# Patient Record
Sex: FEMALE | Race: WHITE | NOT HISPANIC OR LATINO | Employment: OTHER | URBAN - METROPOLITAN AREA
[De-identification: names, ages, dates, MRNs, and addresses within clinical notes are randomized per-mention and may not be internally consistent; named-entity substitution may affect disease eponyms.]

---

## 2023-03-26 PROBLEM — E88.09 HYPERPROTEINEMIA: Status: ACTIVE | Noted: 2023-03-26

## 2023-03-26 PROBLEM — F41.9 ANXIETY AND DEPRESSION: Status: ACTIVE | Noted: 2023-03-26

## 2023-03-26 PROBLEM — R10.9 ABDOMINAL PAIN, RIGHT LATERAL: Status: ACTIVE | Noted: 2023-03-26

## 2023-03-26 PROBLEM — N64.4 BREAST PAIN, RIGHT: Status: ACTIVE | Noted: 2023-03-26

## 2023-03-26 PROBLEM — R73.9 HYPERGLYCEMIA: Status: ACTIVE | Noted: 2023-03-26

## 2023-03-26 PROBLEM — R31.9 HEMATURIA: Status: ACTIVE | Noted: 2023-03-26

## 2023-03-26 PROBLEM — E78.5 HYPERLIPIDEMIA: Status: ACTIVE | Noted: 2023-03-26

## 2023-03-26 PROBLEM — R53.83 FATIGUE: Status: ACTIVE | Noted: 2023-03-26

## 2023-03-26 PROBLEM — R30.0 DYSURIA: Status: ACTIVE | Noted: 2023-03-26

## 2023-03-26 PROBLEM — R45.86 EMOTIONAL LABILITY: Status: ACTIVE | Noted: 2023-03-26

## 2023-03-26 PROBLEM — M25.50 POLYARTHRALGIA: Status: ACTIVE | Noted: 2023-03-26

## 2023-03-26 PROBLEM — M35.3 PMR (POLYMYALGIA RHEUMATICA) (MULTI): Status: ACTIVE | Noted: 2023-03-26

## 2023-03-26 PROBLEM — E83.52 HYPERCALCEMIA: Status: ACTIVE | Noted: 2023-03-26

## 2023-03-26 PROBLEM — J45.998 SEASONAL ASTHMA (HHS-HCC): Status: ACTIVE | Noted: 2023-03-26

## 2023-03-26 PROBLEM — F32.A ANXIETY AND DEPRESSION: Status: ACTIVE | Noted: 2023-03-26

## 2023-03-26 PROBLEM — F32.1 CURRENT MODERATE EPISODE OF MAJOR DEPRESSIVE DISORDER (MULTI): Status: ACTIVE | Noted: 2023-03-26

## 2023-03-26 PROBLEM — N18.31 CHRONIC KIDNEY DISEASE, STAGE 3A (MULTI): Status: ACTIVE | Noted: 2023-03-26

## 2023-03-26 PROBLEM — N39.0 URINARY TRACT INFECTION: Status: ACTIVE | Noted: 2023-03-26

## 2023-03-26 PROBLEM — N39.0 RECURRENT URINARY TRACT INFECTION: Status: ACTIVE | Noted: 2023-03-26

## 2023-03-26 PROBLEM — R35.0 INCREASED URINARY FREQUENCY: Status: ACTIVE | Noted: 2023-03-26

## 2023-03-26 PROBLEM — M54.2 NECK PAIN: Status: ACTIVE | Noted: 2023-03-26

## 2023-03-26 PROBLEM — M25.511 RIGHT SHOULDER PAIN: Status: ACTIVE | Noted: 2023-03-26

## 2023-03-26 PROBLEM — R05.9 COUGH: Status: ACTIVE | Noted: 2023-03-26

## 2023-03-26 PROBLEM — F32.A DEPRESSION: Status: ACTIVE | Noted: 2023-03-26

## 2023-03-26 PROBLEM — N32.81 OVERACTIVE BLADDER: Status: ACTIVE | Noted: 2023-03-26

## 2023-03-26 PROBLEM — M81.0 POSTMENOPAUSAL OSTEOPOROSIS: Status: ACTIVE | Noted: 2023-03-26

## 2023-03-26 PROBLEM — I10 HYPERTENSION: Status: ACTIVE | Noted: 2023-03-26

## 2023-03-26 PROBLEM — M54.6 BACK PAIN, THORACIC: Status: ACTIVE | Noted: 2023-03-26

## 2023-03-26 PROBLEM — R42 LIGHTHEADEDNESS: Status: ACTIVE | Noted: 2023-03-26

## 2023-03-26 PROBLEM — R39.89 BLADDER PAIN: Status: ACTIVE | Noted: 2023-03-26

## 2023-03-26 PROBLEM — R70.0 ELEVATED ERYTHROCYTE SEDIMENTATION RATE: Status: ACTIVE | Noted: 2023-03-26

## 2023-03-26 PROBLEM — M54.50 LOW BACK PAIN: Status: ACTIVE | Noted: 2023-03-26

## 2023-03-26 PROBLEM — J02.9 SORE THROAT: Status: ACTIVE | Noted: 2023-03-26

## 2023-03-26 PROBLEM — R10.2 PELVIC PAIN SYNDROME: Status: ACTIVE | Noted: 2023-03-26

## 2023-03-26 PROBLEM — E55.9 VITAMIN D DEFICIENCY: Status: ACTIVE | Noted: 2023-03-26

## 2023-03-26 RX ORDER — MULTIVITAMIN
1 TABLET ORAL
COMMUNITY
Start: 2017-01-06

## 2023-03-26 RX ORDER — LISINOPRIL 10 MG/1
1 TABLET ORAL DAILY
COMMUNITY
Start: 2016-03-03 | End: 2023-07-25 | Stop reason: SDUPTHER

## 2023-03-26 RX ORDER — COD LIVER OIL
2 OIL (ML) ORAL
COMMUNITY
Start: 2021-12-02

## 2023-03-26 RX ORDER — HYDROCHLOROTHIAZIDE 25 MG/1
1 TABLET ORAL DAILY
COMMUNITY
Start: 2015-06-02 | End: 2023-08-14

## 2023-03-26 RX ORDER — AMLODIPINE BESYLATE 10 MG/1
1 TABLET ORAL DAILY
COMMUNITY
Start: 2016-02-09 | End: 2023-03-31 | Stop reason: DRUGHIGH

## 2023-03-26 RX ORDER — ASCORBIC ACID 125 MG
1 TABLET,CHEWABLE ORAL NIGHTLY
COMMUNITY
Start: 2022-04-13

## 2023-03-26 RX ORDER — EVOLOCUMAB 140 MG/ML
140 INJECTION, SOLUTION SUBCUTANEOUS
COMMUNITY
Start: 2022-05-09 | End: 2023-10-02 | Stop reason: SDUPTHER

## 2023-03-26 RX ORDER — CITALOPRAM 20 MG/1
0.5 TABLET, FILM COATED ORAL DAILY
COMMUNITY
Start: 2020-06-15 | End: 2023-10-02 | Stop reason: ALTCHOICE

## 2023-03-31 ENCOUNTER — OFFICE VISIT (OUTPATIENT)
Dept: PRIMARY CARE | Facility: CLINIC | Age: 68
End: 2023-03-31
Payer: MEDICARE

## 2023-03-31 ENCOUNTER — LAB (OUTPATIENT)
Dept: LAB | Facility: LAB | Age: 68
End: 2023-03-31
Payer: MEDICARE

## 2023-03-31 VITALS
BODY MASS INDEX: 29.53 KG/M2 | WEIGHT: 173 LBS | SYSTOLIC BLOOD PRESSURE: 128 MMHG | HEART RATE: 68 BPM | RESPIRATION RATE: 16 BRPM | OXYGEN SATURATION: 99 % | DIASTOLIC BLOOD PRESSURE: 70 MMHG | TEMPERATURE: 97.6 F | HEIGHT: 64 IN

## 2023-03-31 DIAGNOSIS — N18.31 CHRONIC KIDNEY DISEASE, STAGE 3A (MULTI): ICD-10-CM

## 2023-03-31 DIAGNOSIS — N18.9 CHRONIC KIDNEY DISEASE, UNSPECIFIED CKD STAGE: ICD-10-CM

## 2023-03-31 DIAGNOSIS — N39.0 RECURRENT URINARY TRACT INFECTION: ICD-10-CM

## 2023-03-31 DIAGNOSIS — F32.1 CURRENT MODERATE EPISODE OF MAJOR DEPRESSIVE DISORDER WITHOUT PRIOR EPISODE (MULTI): ICD-10-CM

## 2023-03-31 DIAGNOSIS — M35.3 PMR (POLYMYALGIA RHEUMATICA) (MULTI): ICD-10-CM

## 2023-03-31 DIAGNOSIS — I10 PRIMARY HYPERTENSION: Primary | ICD-10-CM

## 2023-03-31 PROBLEM — R70.0 ELEVATED ERYTHROCYTE SEDIMENTATION RATE: Status: RESOLVED | Noted: 2023-03-26 | Resolved: 2023-03-31

## 2023-03-31 PROBLEM — N64.4 BREAST PAIN, RIGHT: Status: RESOLVED | Noted: 2023-03-26 | Resolved: 2023-03-31

## 2023-03-31 PROBLEM — R45.86 EMOTIONAL LABILITY: Status: RESOLVED | Noted: 2023-03-26 | Resolved: 2023-03-31

## 2023-03-31 PROBLEM — M25.511 RIGHT SHOULDER PAIN: Status: RESOLVED | Noted: 2023-03-26 | Resolved: 2023-03-31

## 2023-03-31 PROBLEM — M54.2 NECK PAIN: Status: RESOLVED | Noted: 2023-03-26 | Resolved: 2023-03-31

## 2023-03-31 PROBLEM — R53.83 FATIGUE: Status: RESOLVED | Noted: 2023-03-26 | Resolved: 2023-03-31

## 2023-03-31 PROBLEM — R42 LIGHTHEADEDNESS: Status: RESOLVED | Noted: 2023-03-26 | Resolved: 2023-03-31

## 2023-03-31 PROBLEM — M54.6 BACK PAIN, THORACIC: Status: RESOLVED | Noted: 2023-03-26 | Resolved: 2023-03-31

## 2023-03-31 PROBLEM — M54.50 LOW BACK PAIN: Status: RESOLVED | Noted: 2023-03-26 | Resolved: 2023-03-31

## 2023-03-31 PROBLEM — R39.89 BLADDER PAIN: Status: RESOLVED | Noted: 2023-03-26 | Resolved: 2023-03-31

## 2023-03-31 PROBLEM — J02.9 SORE THROAT: Status: RESOLVED | Noted: 2023-03-26 | Resolved: 2023-03-31

## 2023-03-31 PROBLEM — R30.0 DYSURIA: Status: RESOLVED | Noted: 2023-03-26 | Resolved: 2023-03-31

## 2023-03-31 PROBLEM — R35.0 INCREASED URINARY FREQUENCY: Status: RESOLVED | Noted: 2023-03-26 | Resolved: 2023-03-31

## 2023-03-31 PROBLEM — R10.9 ABDOMINAL PAIN, RIGHT LATERAL: Status: RESOLVED | Noted: 2023-03-26 | Resolved: 2023-03-31

## 2023-03-31 PROBLEM — R05.9 COUGH: Status: RESOLVED | Noted: 2023-03-26 | Resolved: 2023-03-31

## 2023-03-31 PROBLEM — R10.2 PELVIC PAIN SYNDROME: Status: RESOLVED | Noted: 2023-03-26 | Resolved: 2023-03-31

## 2023-03-31 LAB
ALANINE AMINOTRANSFERASE (SGPT) (U/L) IN SER/PLAS: 14 U/L (ref 7–45)
ALBUMIN (G/DL) IN SER/PLAS: 4.6 G/DL (ref 3.4–5)
ALKALINE PHOSPHATASE (U/L) IN SER/PLAS: 51 U/L (ref 33–136)
ANION GAP IN SER/PLAS: 13 MMOL/L (ref 10–20)
ASPARTATE AMINOTRANSFERASE (SGOT) (U/L) IN SER/PLAS: 16 U/L (ref 9–39)
BILIRUBIN TOTAL (MG/DL) IN SER/PLAS: 0.4 MG/DL (ref 0–1.2)
CALCIUM (MG/DL) IN SER/PLAS: 10.5 MG/DL (ref 8.6–10.3)
CARBON DIOXIDE, TOTAL (MMOL/L) IN SER/PLAS: 32 MMOL/L (ref 21–32)
CHLORIDE (MMOL/L) IN SER/PLAS: 99 MMOL/L (ref 98–107)
CHOLESTEROL (MG/DL) IN SER/PLAS: 342 MG/DL (ref 0–199)
CHOLESTEROL IN HDL (MG/DL) IN SER/PLAS: 74.5 MG/DL
CHOLESTEROL/HDL RATIO: 4.6
COBALAMIN (VITAMIN B12) (PG/ML) IN SER/PLAS: 833 PG/ML (ref 211–911)
CREATININE (MG/DL) IN SER/PLAS: 0.85 MG/DL (ref 0.5–1.05)
ERYTHROCYTE DISTRIBUTION WIDTH (RATIO) BY AUTOMATED COUNT: 14.8 % (ref 11.5–14.5)
ERYTHROCYTE MEAN CORPUSCULAR HEMOGLOBIN CONCENTRATION (G/DL) BY AUTOMATED: 32.2 G/DL (ref 32–36)
ERYTHROCYTE MEAN CORPUSCULAR VOLUME (FL) BY AUTOMATED COUNT: 88 FL (ref 80–100)
ERYTHROCYTES (10*6/UL) IN BLOOD BY AUTOMATED COUNT: 4.86 X10E12/L (ref 4–5.2)
GFR FEMALE: 74 ML/MIN/1.73M2
GLUCOSE (MG/DL) IN SER/PLAS: 100 MG/DL (ref 74–99)
HEMATOCRIT (%) IN BLOOD BY AUTOMATED COUNT: 42.6 % (ref 36–46)
HEMOGLOBIN (G/DL) IN BLOOD: 13.7 G/DL (ref 12–16)
LDL: 214 MG/DL (ref 0–99)
LEUKOCYTES (10*3/UL) IN BLOOD BY AUTOMATED COUNT: 5.4 X10E9/L (ref 4.4–11.3)
NON HDL CHOLESTEROL: 268 MG/DL
PLATELETS (10*3/UL) IN BLOOD AUTOMATED COUNT: 295 X10E9/L (ref 150–450)
POTASSIUM (MMOL/L) IN SER/PLAS: 3.8 MMOL/L (ref 3.5–5.3)
PROTEIN TOTAL: 7.4 G/DL (ref 6.4–8.2)
SEDIMENTATION RATE, ERYTHROCYTE: 17 MM/H (ref 0–30)
SODIUM (MMOL/L) IN SER/PLAS: 140 MMOL/L (ref 136–145)
TRIGLYCERIDE (MG/DL) IN SER/PLAS: 268 MG/DL (ref 0–149)
UREA NITROGEN (MG/DL) IN SER/PLAS: 24 MG/DL (ref 6–23)
VLDL: 54 MG/DL (ref 0–40)

## 2023-03-31 PROCEDURE — 1160F RVW MEDS BY RX/DR IN RCRD: CPT | Performed by: INTERNAL MEDICINE

## 2023-03-31 PROCEDURE — 3074F SYST BP LT 130 MM HG: CPT | Performed by: INTERNAL MEDICINE

## 2023-03-31 PROCEDURE — 80053 COMPREHEN METABOLIC PANEL: CPT

## 2023-03-31 PROCEDURE — 82607 VITAMIN B-12: CPT

## 2023-03-31 PROCEDURE — 99214 OFFICE O/P EST MOD 30 MIN: CPT | Performed by: INTERNAL MEDICINE

## 2023-03-31 PROCEDURE — 1036F TOBACCO NON-USER: CPT | Performed by: INTERNAL MEDICINE

## 2023-03-31 PROCEDURE — 85027 COMPLETE CBC AUTOMATED: CPT

## 2023-03-31 PROCEDURE — 1159F MED LIST DOCD IN RCRD: CPT | Performed by: INTERNAL MEDICINE

## 2023-03-31 PROCEDURE — 3078F DIAST BP <80 MM HG: CPT | Performed by: INTERNAL MEDICINE

## 2023-03-31 PROCEDURE — 85652 RBC SED RATE AUTOMATED: CPT

## 2023-03-31 PROCEDURE — 36415 COLL VENOUS BLD VENIPUNCTURE: CPT

## 2023-03-31 PROCEDURE — 80061 LIPID PANEL: CPT

## 2023-03-31 RX ORDER — AMOXICILLIN AND CLAVULANATE POTASSIUM 875; 125 MG/1; MG/1
875 TABLET, FILM COATED ORAL 2 TIMES DAILY
Qty: 20 TABLET | Refills: 0 | Status: SHIPPED | OUTPATIENT
Start: 2023-03-31 | End: 2023-04-10

## 2023-03-31 RX ORDER — AMLODIPINE BESYLATE 10 MG/1
5 TABLET ORAL DAILY
Qty: 45 TABLET | Refills: 3 | Status: SHIPPED | OUTPATIENT
Start: 2023-03-31 | End: 2023-08-14

## 2023-03-31 RX ORDER — AMOXICILLIN AND CLAVULANATE POTASSIUM 875; 125 MG/1; MG/1
TABLET, FILM COATED ORAL 2 TIMES DAILY
COMMUNITY
End: 2023-03-31 | Stop reason: SDUPTHER

## 2023-03-31 ASSESSMENT — PATIENT HEALTH QUESTIONNAIRE - PHQ9
1. LITTLE INTEREST OR PLEASURE IN DOING THINGS: NOT AT ALL
SUM OF ALL RESPONSES TO PHQ9 QUESTIONS 1 AND 2: 0
2. FEELING DOWN, DEPRESSED OR HOPELESS: NOT AT ALL

## 2023-03-31 ASSESSMENT — ENCOUNTER SYMPTOMS
FATIGUE: 0
FEVER: 0
COUGH: 0
SHORTNESS OF BREATH: 0

## 2023-03-31 NOTE — PROGRESS NOTES
"Subjective   Sherrie Chan is a 67 y.o. female who presents for 6 month Hypertension follow up.    HPI   Occasionally monitoring BP.  States BP WNL.  Denies chest pain, SOB, edema.  C/o increase in migraine like headaches. Frontal.  Vision blurred.  Occurring w/UTI's.   Last for a few weeks after.    Pt reports taking ATB PRN for urinary sx's.  Exacerbated beginning of March.  No further sx's.  Feeling much better.    Review of Systems   Constitutional:  Negative for fatigue and fever.   Respiratory:  Negative for cough and shortness of breath.    Cardiovascular:  Negative for chest pain and leg swelling.   All other systems reviewed and are negative.      Health Maintenance Due   Topic Date Due    Medicare Annual Wellness Visit (AWV)  Never done    Zoster Vaccines (1 of 2) Never done    DTaP/Tdap/Td Vaccines (2 - Td or Tdap) 01/27/2019    Pneumococcal Vaccine: 65+ Years (2 - PPSV23 if available, else PCV20) 07/11/2019    COVID-19 Vaccine (4 - Booster for Pfizer series) 12/19/2021    Influenza Vaccine (1) 09/01/2022    Diabetes Screening  12/02/2022       Objective   /70   Pulse 68   Temp 36.4 °C (97.6 °F)   Resp 16   Ht 1.626 m (5' 4\")   Wt 78.5 kg (173 lb)   SpO2 99%   BMI 29.70 kg/m²     Physical Exam  Vitals and nursing note reviewed.   Constitutional:       Appearance: Normal appearance.   HENT:      Head: Normocephalic.   Eyes:      Conjunctiva/sclera: Conjunctivae normal.      Pupils: Pupils are equal, round, and reactive to light.   Cardiovascular:      Rate and Rhythm: Normal rate and regular rhythm.      Pulses: Normal pulses.      Heart sounds: Normal heart sounds.   Pulmonary:      Effort: Pulmonary effort is normal.      Breath sounds: Normal breath sounds.   Musculoskeletal:         General: No swelling.      Cervical back: Neck supple.   Skin:     General: Skin is warm and dry.   Neurological:      General: No focal deficit present.      Mental Status: She is oriented to person, place, " and time.         Assessment/Plan   Problem List Items Addressed This Visit       Chronic kidney disease, stage 3a    Relevant Orders    CBC    Comprehensive Metabolic Panel    Lipid Panel    Vitamin B12    Depression    Hypertension - Primary    PMR (polymyalgia rheumatica) (CMS/[facility])    Relevant Orders    Sedimentation Rate    Recurrent urinary tract infection    Relevant Medications    amoxicillin-pot clavulanate (Augmentin) 875-125 mg tablet     Other Visit Diagnoses       Chronic kidney disease, unspecified CKD stage        Relevant Orders    Lipid Panel          Check labs  Cont meds  Stable based on symptoms and exam.  Continue established treatment plan and follow up at least yearly.  Cont abx as needed for uti  Call when symptoms andwill order CT Urogram   Samples of nurtec for headaches

## 2023-05-16 ENCOUNTER — TELEPHONE (OUTPATIENT)
Dept: PRIMARY CARE | Facility: CLINIC | Age: 68
End: 2023-05-16
Payer: MEDICARE

## 2023-05-16 DIAGNOSIS — R10.9 FLANK PAIN: ICD-10-CM

## 2023-05-16 DIAGNOSIS — N39.0 URINARY TRACT INFECTION WITHOUT HEMATURIA, SITE UNSPECIFIED: ICD-10-CM

## 2023-05-16 DIAGNOSIS — R31.9 HEMATURIA, UNSPECIFIED TYPE: Primary | ICD-10-CM

## 2023-05-16 DIAGNOSIS — N39.0 RECURRENT URINARY TRACT INFECTION: ICD-10-CM

## 2023-05-16 RX ORDER — AMOXICILLIN AND CLAVULANATE POTASSIUM 875; 125 MG/1; MG/1
875 TABLET, FILM COATED ORAL 2 TIMES DAILY
Qty: 20 TABLET | Refills: 0 | Status: SHIPPED | OUTPATIENT
Start: 2023-05-16 | End: 2023-05-26

## 2023-05-16 NOTE — TELEPHONE ENCOUNTER
Pt left vm stating she discussed w/you during last appt about getting CT Urogram when sx's are flaring.  Sx's are currently flaring.  Ok for Urogram?  Please advise.

## 2023-05-23 DIAGNOSIS — R31.1 BENIGN ESSENTIAL MICROSCOPIC HEMATURIA: Primary | ICD-10-CM

## 2023-05-25 ENCOUNTER — LAB (OUTPATIENT)
Dept: LAB | Facility: LAB | Age: 68
End: 2023-05-25
Payer: MEDICARE

## 2023-05-25 DIAGNOSIS — R31.1 BENIGN ESSENTIAL MICROSCOPIC HEMATURIA: ICD-10-CM

## 2023-05-25 LAB
ANION GAP IN SER/PLAS: 11 MMOL/L (ref 10–20)
CALCIUM (MG/DL) IN SER/PLAS: 10 MG/DL (ref 8.6–10.3)
CARBON DIOXIDE, TOTAL (MMOL/L) IN SER/PLAS: 32 MMOL/L (ref 21–32)
CHLORIDE (MMOL/L) IN SER/PLAS: 100 MMOL/L (ref 98–107)
CREATININE (MG/DL) IN SER/PLAS: 1.02 MG/DL (ref 0.5–1.05)
GFR FEMALE: 59 ML/MIN/1.73M2
GLUCOSE (MG/DL) IN SER/PLAS: 117 MG/DL (ref 74–99)
POTASSIUM (MMOL/L) IN SER/PLAS: 3.9 MMOL/L (ref 3.5–5.3)
SODIUM (MMOL/L) IN SER/PLAS: 139 MMOL/L (ref 136–145)
UREA NITROGEN (MG/DL) IN SER/PLAS: 24 MG/DL (ref 6–23)

## 2023-05-25 PROCEDURE — 80048 BASIC METABOLIC PNL TOTAL CA: CPT

## 2023-05-25 PROCEDURE — 36415 COLL VENOUS BLD VENIPUNCTURE: CPT

## 2023-05-30 ENCOUNTER — TELEPHONE (OUTPATIENT)
Dept: PRIMARY CARE | Facility: CLINIC | Age: 68
End: 2023-05-30
Payer: MEDICARE

## 2023-05-30 NOTE — TELEPHONE ENCOUNTER
----- Message from Eufemia Ascencio DO sent at 5/30/2023  4:50 PM EDT -----  No they are in the colon and there is no surrounding inflammation or suspicion for fistula  ----- Message -----  From: Amisha Lin LPN  Sent: 5/30/2023   3:56 PM EDT  To: Eufemia Ascencio DO    Pt made aware.  Pt read scan.  Questioned the mention of diverticula and could be r/t recurrent UTI's?  Please advise.  ----- Message -----  From: Amisha Lin LPN  Sent: 5/30/2023  11:59 AM EDT  To: Amisha Lin LPN    [facility]  ----- Message -----  From: Eufemia Ascencio DO  Sent: 5/30/2023  10:06 AM EDT  To: Amisha Lin LPN    Call patient looks pretty good  Tiny cyst in left kidney  Fatty liver  Small belly button hernia

## 2023-07-13 ENCOUNTER — OFFICE VISIT (OUTPATIENT)
Dept: PRIMARY CARE | Facility: CLINIC | Age: 68
End: 2023-07-13
Payer: MEDICARE

## 2023-07-13 VITALS
WEIGHT: 176 LBS | HEIGHT: 64 IN | BODY MASS INDEX: 30.05 KG/M2 | DIASTOLIC BLOOD PRESSURE: 72 MMHG | TEMPERATURE: 97.8 F | RESPIRATION RATE: 16 BRPM | HEART RATE: 80 BPM | SYSTOLIC BLOOD PRESSURE: 138 MMHG | OXYGEN SATURATION: 98 %

## 2023-07-13 DIAGNOSIS — N39.0 RECURRENT URINARY TRACT INFECTION: ICD-10-CM

## 2023-07-13 PROBLEM — M25.50 POLYARTHRALGIA: Status: RESOLVED | Noted: 2023-03-26 | Resolved: 2023-07-13

## 2023-07-13 PROBLEM — R31.9 HEMATURIA: Status: RESOLVED | Noted: 2023-03-26 | Resolved: 2023-07-13

## 2023-07-13 PROBLEM — E83.52 HYPERCALCEMIA: Status: RESOLVED | Noted: 2023-03-26 | Resolved: 2023-07-13

## 2023-07-13 LAB
POC APPEARANCE, URINE: CLEAR
POC BILIRUBIN, URINE: NEGATIVE
POC BLOOD, URINE: ABNORMAL
POC COLOR, URINE: ABNORMAL
POC GLUCOSE, URINE: NEGATIVE MG/DL
POC KETONES, URINE: NEGATIVE MG/DL
POC LEUKOCYTES, URINE: ABNORMAL
POC NITRITE,URINE: NEGATIVE
POC PH, URINE: 5 PH
POC PROTEIN, URINE: NEGATIVE MG/DL
POC SPECIFIC GRAVITY, URINE: <=1.005
POC UROBILINOGEN, URINE: 0.2 EU/DL

## 2023-07-13 PROCEDURE — 1159F MED LIST DOCD IN RCRD: CPT | Performed by: INTERNAL MEDICINE

## 2023-07-13 PROCEDURE — 1036F TOBACCO NON-USER: CPT | Performed by: INTERNAL MEDICINE

## 2023-07-13 PROCEDURE — 1126F AMNT PAIN NOTED NONE PRSNT: CPT | Performed by: INTERNAL MEDICINE

## 2023-07-13 PROCEDURE — 1160F RVW MEDS BY RX/DR IN RCRD: CPT | Performed by: INTERNAL MEDICINE

## 2023-07-13 PROCEDURE — 99214 OFFICE O/P EST MOD 30 MIN: CPT | Performed by: INTERNAL MEDICINE

## 2023-07-13 PROCEDURE — 81002 URINALYSIS NONAUTO W/O SCOPE: CPT | Performed by: INTERNAL MEDICINE

## 2023-07-13 PROCEDURE — 3075F SYST BP GE 130 - 139MM HG: CPT | Performed by: INTERNAL MEDICINE

## 2023-07-13 PROCEDURE — 3078F DIAST BP <80 MM HG: CPT | Performed by: INTERNAL MEDICINE

## 2023-07-13 RX ORDER — METRONIDAZOLE 7.5 MG/G
CREAM TOPICAL
COMMUNITY
Start: 2023-06-19 | End: 2023-11-08 | Stop reason: ENTERED-IN-ERROR

## 2023-07-13 RX ORDER — CEFDINIR 300 MG/1
300 CAPSULE ORAL 2 TIMES DAILY
Qty: 20 CAPSULE | Refills: 0 | Status: SHIPPED | OUTPATIENT
Start: 2023-07-13 | End: 2023-07-23

## 2023-07-13 RX ORDER — NITROFURANTOIN 25; 75 MG/1; MG/1
100 CAPSULE ORAL DAILY
Qty: 30 CAPSULE | Refills: 5 | Status: SHIPPED | OUTPATIENT
Start: 2023-07-13 | End: 2023-10-02 | Stop reason: SINTOL

## 2023-07-13 ASSESSMENT — ENCOUNTER SYMPTOMS
COUGH: 0
FATIGUE: 0
SHORTNESS OF BREATH: 0
FEVER: 0

## 2023-07-13 ASSESSMENT — PATIENT HEALTH QUESTIONNAIRE - PHQ9
SUM OF ALL RESPONSES TO PHQ9 QUESTIONS 1 AND 2: 0
1. LITTLE INTEREST OR PLEASURE IN DOING THINGS: NOT AT ALL
2. FEELING DOWN, DEPRESSED OR HOPELESS: NOT AT ALL

## 2023-07-13 NOTE — PROGRESS NOTES
"Subjective   Sherrie Chan is a 68 y.o. female who presents for UTI.    HPI   Pt c/o continued UTI sx's.    Worsened over this past weekend.  Dizzy, lightheaded, fatigue, pelvic, R flank pain.  Denies hematuria.  Tx: Aleve    Review of Systems   Constitutional:  Negative for fatigue and fever.   Respiratory:  Negative for cough and shortness of breath.    Cardiovascular:  Negative for chest pain and leg swelling.   All other systems reviewed and are negative.      Health Maintenance Due   Topic Date Due    Medicare Annual Wellness Visit (AWV)  Never done    Zoster Vaccines (1 of 2) Never done    DTaP/Tdap/Td Vaccines (2 - Td or Tdap) 01/27/2019    Pneumococcal Vaccine: 65+ Years (2 - PPSV23 if available, else PCV20) 07/11/2019    COVID-19 Vaccine (4 - Booster for Pfizer series) 12/19/2021    Diabetes Screening  12/02/2022    Mammogram  09/08/2023       Objective   /72   Pulse 80   Temp 36.6 °C (97.8 °F)   Resp 16   Ht 1.626 m (5' 4\")   Wt 79.8 kg (176 lb)   SpO2 98%   BMI 30.21 kg/m²     Physical Exam  Vitals and nursing note reviewed.   Constitutional:       Appearance: Normal appearance.   HENT:      Head: Normocephalic.   Eyes:      Conjunctiva/sclera: Conjunctivae normal.      Pupils: Pupils are equal, round, and reactive to light.   Cardiovascular:      Rate and Rhythm: Normal rate and regular rhythm.      Pulses: Normal pulses.      Heart sounds: Normal heart sounds.   Pulmonary:      Effort: Pulmonary effort is normal.      Breath sounds: Normal breath sounds.   Musculoskeletal:         General: No swelling.      Cervical back: Neck supple.   Skin:     General: Skin is warm and dry.   Neurological:      General: No focal deficit present.      Mental Status: She is oriented to person, place, and time.         Assessment/Plan   Problem List Items Addressed This Visit       Recurrent urinary tract infection    Relevant Medications    cefdinir (Omnicef) 300 mg capsule    nitrofurantoin, " macrocrystal-monohydrate, (Macrobid) 100 mg capsule    Other Relevant Orders    POCT UA (nonautomated) manually resulted (Completed)    Referral to Urology     Fu with urology  Abx and maintenance  Discussed risks and benefits of long term abx  Stable based on symptoms and exam.  Continue established treatment plan and follow up at least yearly.

## 2023-07-25 DIAGNOSIS — I10 PRIMARY HYPERTENSION: Primary | ICD-10-CM

## 2023-07-25 RX ORDER — LISINOPRIL 10 MG/1
10 TABLET ORAL DAILY
Qty: 90 TABLET | Refills: 3 | Status: SHIPPED | OUTPATIENT
Start: 2023-07-25 | End: 2024-02-12 | Stop reason: DRUGHIGH

## 2023-08-14 DIAGNOSIS — I10 ESSENTIAL (PRIMARY) HYPERTENSION: ICD-10-CM

## 2023-08-14 RX ORDER — AMLODIPINE BESYLATE 10 MG/1
10 TABLET ORAL DAILY
Qty: 90 TABLET | Refills: 3 | Status: SHIPPED | OUTPATIENT
Start: 2023-08-14 | End: 2024-05-08

## 2023-08-14 RX ORDER — HYDROCHLOROTHIAZIDE 25 MG/1
25 TABLET ORAL DAILY
Qty: 90 TABLET | Refills: 3 | Status: SHIPPED | OUTPATIENT
Start: 2023-08-14 | End: 2024-05-08

## 2023-08-17 ENCOUNTER — TELEPHONE (OUTPATIENT)
Dept: PRIMARY CARE | Facility: CLINIC | Age: 68
End: 2023-08-17
Payer: MEDICARE

## 2023-08-17 NOTE — TELEPHONE ENCOUNTER
Pt left vm c/o UTI sx's x1 week.  LBP, dysuria, pelvic discomfort.  Taking Macrobid BID x1 week. C/o nausea, headache, dizziness w/use.  Stated she responds well to Bactrim.  Questioned if Dr. Ascencio would Rx.  Dr. Ascencio made aware and will be out of office.  Recommended [facility] eval.    Called pt and left vm to make aware.

## 2023-08-21 ENCOUNTER — TELEPHONE (OUTPATIENT)
Dept: PRIMARY CARE | Facility: CLINIC | Age: 68
End: 2023-08-21
Payer: MEDICARE

## 2023-08-21 LAB — URINE CULTURE: NO GROWTH

## 2023-08-21 NOTE — TELEPHONE ENCOUNTER
Pt left  stating she was seen at [facility] for UTI sx's on Sat.  Rx: Augmentin.  Taken x5 doses.  UTI sx's were improving. C/o diarrhea today and some nausea.  Questioned if ATB should be d/c'd?  Please advise.

## 2023-10-02 ENCOUNTER — OFFICE VISIT (OUTPATIENT)
Dept: PRIMARY CARE | Facility: CLINIC | Age: 68
End: 2023-10-02
Payer: MEDICARE

## 2023-10-02 VITALS
SYSTOLIC BLOOD PRESSURE: 142 MMHG | DIASTOLIC BLOOD PRESSURE: 78 MMHG | BODY MASS INDEX: 30.22 KG/M2 | WEIGHT: 177 LBS | OXYGEN SATURATION: 100 % | TEMPERATURE: 97.9 F | HEIGHT: 64 IN | HEART RATE: 72 BPM | RESPIRATION RATE: 16 BRPM

## 2023-10-02 DIAGNOSIS — N18.31 CHRONIC KIDNEY DISEASE, STAGE 3A (MULTI): ICD-10-CM

## 2023-10-02 DIAGNOSIS — Z12.31 ENCOUNTER FOR SCREENING MAMMOGRAM FOR MALIGNANT NEOPLASM OF BREAST: ICD-10-CM

## 2023-10-02 DIAGNOSIS — E78.2 MIXED HYPERLIPIDEMIA: ICD-10-CM

## 2023-10-02 DIAGNOSIS — N39.0 RECURRENT URINARY TRACT INFECTION: ICD-10-CM

## 2023-10-02 DIAGNOSIS — Z00.00 HEALTHCARE MAINTENANCE: Primary | ICD-10-CM

## 2023-10-02 DIAGNOSIS — F33.1 MODERATE EPISODE OF RECURRENT MAJOR DEPRESSIVE DISORDER (MULTI): ICD-10-CM

## 2023-10-02 DIAGNOSIS — Z23 ENCOUNTER FOR IMMUNIZATION: ICD-10-CM

## 2023-10-02 DIAGNOSIS — I10 PRIMARY HYPERTENSION: ICD-10-CM

## 2023-10-02 DIAGNOSIS — M35.3 PMR (POLYMYALGIA RHEUMATICA) (MULTI): ICD-10-CM

## 2023-10-02 DIAGNOSIS — E55.9 VITAMIN D DEFICIENCY: ICD-10-CM

## 2023-10-02 PROCEDURE — G0008 ADMIN INFLUENZA VIRUS VAC: HCPCS | Performed by: INTERNAL MEDICINE

## 2023-10-02 PROCEDURE — 90662 IIV NO PRSV INCREASED AG IM: CPT | Performed by: INTERNAL MEDICINE

## 2023-10-02 PROCEDURE — 1170F FXNL STATUS ASSESSED: CPT | Performed by: INTERNAL MEDICINE

## 2023-10-02 PROCEDURE — 1159F MED LIST DOCD IN RCRD: CPT | Performed by: INTERNAL MEDICINE

## 2023-10-02 PROCEDURE — 3078F DIAST BP <80 MM HG: CPT | Performed by: INTERNAL MEDICINE

## 2023-10-02 PROCEDURE — 1160F RVW MEDS BY RX/DR IN RCRD: CPT | Performed by: INTERNAL MEDICINE

## 2023-10-02 PROCEDURE — 99214 OFFICE O/P EST MOD 30 MIN: CPT | Performed by: INTERNAL MEDICINE

## 2023-10-02 PROCEDURE — 1036F TOBACCO NON-USER: CPT | Performed by: INTERNAL MEDICINE

## 2023-10-02 PROCEDURE — 1126F AMNT PAIN NOTED NONE PRSNT: CPT | Performed by: INTERNAL MEDICINE

## 2023-10-02 PROCEDURE — 3077F SYST BP >= 140 MM HG: CPT | Performed by: INTERNAL MEDICINE

## 2023-10-02 PROCEDURE — G0439 PPPS, SUBSEQ VISIT: HCPCS | Performed by: INTERNAL MEDICINE

## 2023-10-02 RX ORDER — LACTOBACILLUS ACIDOPHILUS 20B CELL
1 CAPSULE ORAL DAILY
COMMUNITY
Start: 2023-08-31

## 2023-10-02 ASSESSMENT — ENCOUNTER SYMPTOMS
RHINORRHEA: 0
DIARRHEA: 0
DYSURIA: 0
ABDOMINAL PAIN: 0
WHEEZING: 0
EYE PAIN: 0
FEVER: 0
BACK PAIN: 0
DEPRESSION: 0
LOSS OF SENSATION IN FEET: 0
CONSTIPATION: 0
DIFFICULTY URINATING: 0
EYE REDNESS: 0
OCCASIONAL FEELINGS OF UNSTEADINESS: 0
ARTHRALGIAS: 0
WEAKNESS: 0
SHORTNESS OF BREATH: 0
NAUSEA: 0
PALPITATIONS: 0
COUGH: 0
PSYCHIATRIC NEGATIVE: 1
EYE ITCHING: 0
HEADACHES: 0
UNEXPECTED WEIGHT CHANGE: 0
FATIGUE: 0
FREQUENCY: 0
SORE THROAT: 0

## 2023-10-02 ASSESSMENT — ACTIVITIES OF DAILY LIVING (ADL)
MANAGING_FINANCES: INDEPENDENT
DRESSING: INDEPENDENT
TAKING_MEDICATION: INDEPENDENT
BATHING: INDEPENDENT
DOING_HOUSEWORK: INDEPENDENT
GROCERY_SHOPPING: INDEPENDENT

## 2023-10-02 ASSESSMENT — PAIN SCALES - GENERAL: PAINLEVEL: 0-NO PAIN

## 2023-10-02 NOTE — PROGRESS NOTES
Subjective   Reason for Visit: Sherrie Chan is an 68 y.o. female here for a Medicare Wellness visit.     Past Medical, Surgical, and Family History reviewed and updated in chart.    Reviewed all medications by prescribing practitioner or clinical pharmacist (such as prescriptions, OTCs, herbal therapies and supplements) and documented in the medical record.    HPI  Influenza 2022  Covid 2021  PCV13 2019  PPSV23  Shingrix  Tdap  Mammogram 9/2022  Dexa 2022  Cologuard 2021  Adv Dir No  Eye exam 23  Fall risk 23  Depression screen 23  Bmi 30    Pt reports seeing new Urologist.  Does not want to start maintenance ATB.  Rx: D Mannose, Estrace cream, Pelvic floor exercises.  Taking D Mannose every day. Reports nausea, GERD, loose BM's in AM w/BID use.  C/o nocturia, incontinence.    Reports headaches and GI upset w/Macrobid use.  Seen at Urgent Care in August.  Rx: Augmentin.    Patient Care Team:  Eufemia Ascencio DO as PCP - General  Eufemia Ascencio DO as PCP - [employer] PCP     Review of Systems   Constitutional:  Negative for fatigue, fever and unexpected weight change.   HENT:  Negative for congestion, ear pain, rhinorrhea and sore throat.    Eyes:  Negative for pain, redness and itching.   Respiratory:  Negative for cough, shortness of breath and wheezing.    Cardiovascular:  Negative for chest pain, palpitations and leg swelling.   Gastrointestinal:  Negative for abdominal pain, constipation, diarrhea and nausea.   Genitourinary:  Negative for difficulty urinating, dysuria and frequency.   Musculoskeletal:  Negative for arthralgias and back pain.   Allergic/Immunologic: Negative for environmental allergies, food allergies and immunocompromised state.   Neurological:  Negative for weakness and headaches.   Psychiatric/Behavioral: Negative.     All other systems reviewed and are negative.      Objective   Vitals:  /78   Pulse 72   Temp 36.6 °C (97.9 °F)   Resp 16   Ht 1.613 m (5'  "3.5\")   Wt 80.3 kg (177 lb)   SpO2 100%   BMI 30.86 kg/m²       Physical Exam  Vitals and nursing note reviewed.   Constitutional:       Appearance: Normal appearance.   HENT:      Head: Normocephalic.   Eyes:      Conjunctiva/sclera: Conjunctivae normal.      Pupils: Pupils are equal, round, and reactive to light.   Cardiovascular:      Rate and Rhythm: Normal rate and regular rhythm.      Pulses: Normal pulses.      Heart sounds: Normal heart sounds.   Pulmonary:      Effort: Pulmonary effort is normal.      Breath sounds: Normal breath sounds.   Musculoskeletal:         General: No swelling.      Cervical back: Neck supple.   Skin:     General: Skin is warm and dry.   Neurological:      General: No focal deficit present.      Mental Status: She is oriented to person, place, and time.         Assessment/Plan   Problem List Items Addressed This Visit       Chronic kidney disease, stage 3a (CMS/HCC)    Overview     Eufemia Ascencio  Apr 25, 2022 8:15AM  Chronic Condition Documentation: Stable based on last GFR. Continue established treatment plan including avoidance of nephrotoxins and follow-up at least yearly.         Current moderate episode of major depressive disorder (CMS/[facility])    Overview     Eufemia Ascencio  Apr 25, 2022 8:14AM  Chronic Condition Documentation: Stable based on symptoms and exam. Continue established treatment plan and follow-up at least yearly.         Hyperlipidemia    Relevant Orders    Lipid Panel    Thyroid Stimulating Hormone    Hypertension    Relevant Orders    CBC    Comprehensive Metabolic Panel    PMR (polymyalgia rheumatica) (CMS/[facility])    Overview     Eufemia Ascencio  Apr 25, 2022 8:14AM  Chronic Condition Documentation: Stable based on symptoms and exam. Continue established treatment plan and follow-up at least yearly.         Recurrent urinary tract infection    Vitamin D deficiency    Relevant Orders    Vitamin D 25-Hydroxy,Total (for eval of Vitamin D levels)    Vitamin B12 "     Other Visit Diagnoses       Healthcare maintenance    -  Primary    Encounter for screening mammogram for malignant neoplasm of breast        Relevant Orders    BI mammo bilateral screening tomosynthesis        Update preventive   Cont meds  Check labs  Stable based on symptoms and exam.  Continue established treatment plan and follow up at least yearly.

## 2023-10-04 ENCOUNTER — SPECIALTY PHARMACY (OUTPATIENT)
Dept: PHARMACY | Facility: CLINIC | Age: 68
End: 2023-10-04

## 2023-10-04 ENCOUNTER — PHARMACY VISIT (OUTPATIENT)
Dept: PHARMACY | Facility: CLINIC | Age: 68
End: 2023-10-04
Payer: COMMERCIAL

## 2023-10-04 PROCEDURE — RXMED WILLOW AMBULATORY MEDICATION CHARGE

## 2023-10-04 NOTE — PROGRESS NOTES
Specialty Pharmacy Refill Coordination Note     Sherrie Chan is a 68 y.o. female contacted today regarding refills of her specialty medication(s).    Reviewed and verified with patient:                Specialty medication(s) and dose(s) confirmed: yes  Changes to medications: no  Changes to insurance: no    Refill Questions      Flowsheet Row Most Recent Value   Changes to allergies? No   Changes to medications? No   New conditions since last clinic visit No   Unplanned office visit, urgent care, ED, or hospital admission in the last 4 weeks  No            Delivery Questions      Flowsheet Row Most Recent Value   Supplies needed? No supplies needed   Questions or concerns for the pharmacist? No          Spoke with pt Set Delivery for 10/11/2023 MED: REPATHA    ADDRESS:     [address] [place] [address]                 Deb Doherty  Specialty Pharmacy Technician

## 2023-10-16 ENCOUNTER — SPECIALTY PHARMACY (OUTPATIENT)
Dept: PHARMACY | Facility: CLINIC | Age: 68
End: 2023-10-16

## 2023-10-23 ENCOUNTER — APPOINTMENT (OUTPATIENT)
Dept: RADIOLOGY | Facility: CLINIC | Age: 68
End: 2023-10-23
Payer: MEDICARE

## 2023-11-02 ENCOUNTER — EVALUATION (OUTPATIENT)
Dept: PHYSICAL THERAPY | Facility: CLINIC | Age: 68
End: 2023-11-02
Payer: MEDICARE

## 2023-11-02 ENCOUNTER — LAB (OUTPATIENT)
Dept: LAB | Facility: LAB | Age: 68
End: 2023-11-02
Payer: MEDICARE

## 2023-11-02 DIAGNOSIS — I10 PRIMARY HYPERTENSION: ICD-10-CM

## 2023-11-02 DIAGNOSIS — R10.2 PELVIC PAIN: Primary | ICD-10-CM

## 2023-11-02 DIAGNOSIS — M62.89 PELVIC FLOOR DYSFUNCTION: ICD-10-CM

## 2023-11-02 DIAGNOSIS — E78.2 MIXED HYPERLIPIDEMIA: ICD-10-CM

## 2023-11-02 DIAGNOSIS — M62.89 OTHER SPECIFIED DISORDERS OF MUSCLE: ICD-10-CM

## 2023-11-02 DIAGNOSIS — R10.2 PELVIC AND PERINEAL PAIN: ICD-10-CM

## 2023-11-02 DIAGNOSIS — E55.9 VITAMIN D DEFICIENCY: ICD-10-CM

## 2023-11-02 LAB
25(OH)D3 SERPL-MCNC: 40 NG/ML (ref 30–100)
ALBUMIN SERPL BCP-MCNC: 4.4 G/DL (ref 3.4–5)
ALP SERPL-CCNC: 47 U/L (ref 33–136)
ALT SERPL W P-5'-P-CCNC: 13 U/L (ref 7–45)
ANION GAP SERPL CALC-SCNC: 14 MMOL/L (ref 10–20)
AST SERPL W P-5'-P-CCNC: 17 U/L (ref 9–39)
BILIRUB SERPL-MCNC: 0.3 MG/DL (ref 0–1.2)
BUN SERPL-MCNC: 21 MG/DL (ref 6–23)
CALCIUM SERPL-MCNC: 9.9 MG/DL (ref 8.6–10.3)
CHLORIDE SERPL-SCNC: 99 MMOL/L (ref 98–107)
CHOLEST SERPL-MCNC: 285 MG/DL (ref 0–199)
CHOLESTEROL/HDL RATIO: 4.1
CO2 SERPL-SCNC: 30 MMOL/L (ref 21–32)
CREAT SERPL-MCNC: 0.91 MG/DL (ref 0.5–1.05)
ERYTHROCYTE [DISTWIDTH] IN BLOOD BY AUTOMATED COUNT: 14.7 % (ref 11.5–14.5)
GFR SERPL CREATININE-BSD FRML MDRD: 68 ML/MIN/1.73M*2
GLUCOSE SERPL-MCNC: 109 MG/DL (ref 74–99)
HCT VFR BLD AUTO: 41.8 % (ref 36–46)
HDLC SERPL-MCNC: 69.8 MG/DL
HGB BLD-MCNC: 13.3 G/DL (ref 12–16)
LDLC SERPL CALC-MCNC: 161 MG/DL
MCH RBC QN AUTO: 27.7 PG (ref 26–34)
MCHC RBC AUTO-ENTMCNC: 31.8 G/DL (ref 32–36)
MCV RBC AUTO: 87 FL (ref 80–100)
NON HDL CHOLESTEROL: 215 MG/DL (ref 0–149)
NRBC BLD-RTO: 0 /100 WBCS (ref 0–0)
PLATELET # BLD AUTO: 270 X10*3/UL (ref 150–450)
POTASSIUM SERPL-SCNC: 3.9 MMOL/L (ref 3.5–5.3)
PROT SERPL-MCNC: 7.2 G/DL (ref 6.4–8.2)
RBC # BLD AUTO: 4.8 X10*6/UL (ref 4–5.2)
SODIUM SERPL-SCNC: 139 MMOL/L (ref 136–145)
TRIGL SERPL-MCNC: 269 MG/DL (ref 0–149)
TSH SERPL-ACNC: 2.21 MIU/L (ref 0.44–3.98)
VIT B12 SERPL-MCNC: 726 PG/ML (ref 211–911)
VLDL: 54 MG/DL (ref 0–40)
WBC # BLD AUTO: 5.7 X10*3/UL (ref 4.4–11.3)

## 2023-11-02 PROCEDURE — 84443 ASSAY THYROID STIM HORMONE: CPT

## 2023-11-02 PROCEDURE — 36415 COLL VENOUS BLD VENIPUNCTURE: CPT

## 2023-11-02 PROCEDURE — 97140 MANUAL THERAPY 1/> REGIONS: CPT | Mod: GP | Performed by: PHYSICAL THERAPIST

## 2023-11-02 PROCEDURE — 97162 PT EVAL MOD COMPLEX 30 MIN: CPT | Mod: GP | Performed by: PHYSICAL THERAPIST

## 2023-11-02 PROCEDURE — 82607 VITAMIN B-12: CPT

## 2023-11-02 PROCEDURE — 97110 THERAPEUTIC EXERCISES: CPT | Mod: GP | Performed by: PHYSICAL THERAPIST

## 2023-11-02 PROCEDURE — 80053 COMPREHEN METABOLIC PANEL: CPT

## 2023-11-02 PROCEDURE — 80061 LIPID PANEL: CPT

## 2023-11-02 PROCEDURE — 85027 COMPLETE CBC AUTOMATED: CPT

## 2023-11-02 PROCEDURE — 82306 VITAMIN D 25 HYDROXY: CPT

## 2023-11-02 NOTE — LETTER
November 5, 2023     Patient: Sherrie Chan   YOB: 1955   Date of Visit: 11/2/2023       To Whom it May Concern:    Sherrie Chan was seen in my clinic on 11/2/2023. She {Return to school/sport:52274}.    If you have any questions or concerns, please don't hesitate to call.         Sincerely,          Ayse Riley, PT        CC: No Recipients

## 2023-11-02 NOTE — LETTER
November 5, 2023    Ayse Riley PT  [address]   [facility]  [facility] [address]    Patient: Sherrie Chan   YOB: 1955   Date of Visit: 11/2/2023       Dear Tony Benitez Md  [address]  [place] [address]    The attached plan of care is being sent to you because your patient’s medical reimbursement requires that you certify the plan of care. Your signature is required to allow uninterrupted insurance coverage.      You may indicate your approval by signing below and faxing this form back to us at Dept Fax: [phone].    Please call Dept: [phone] with any questions or concerns.    Thank you for this referral,        Ayse Riley PT  [address] [place] [address]    Payer: Payor: [employer] / Plan: [employer] MEDICARE / Product Type: *No Product type* /                                                                         Date:     Dear Ayse Riley PT,     Re: Ms. Sherrie Chan, MRN:[id number]    I certify that I have reviewed the attached plan of care and it is medically necessary for Ms. Sherrie Chan (1955) who is under my care.          ______________________________________                    _________________  Provider name and credentials                                           Date and time                                                                                           Plan of Care 11/2/23   Effective from: 11/2/2023  Effective to: 1/25/2024    Plan ID: [id number]            Participants as of Finalize on 11/5/2023    Name Type Comments Contact Info    Tony Benitez MD Referring Provider  [phone]    Ayse Riley PT Physical Therapist  [phone]       Last Plan Note     Author: Ayse Riley PT Status: Sign when Signing Visit Last edited: 11/2/2023  8:00 AM       Physical Therapy    Physical Therapy  Evaluation and Treatment      Patient Name: Sherrie Chan  MRN: [id number]  Today's Date: 11-2-23  Time Calculation  Start Time: 0800  Stop Time: 0900  Time Calculation (min): 60 min    Visit 1/10 per poc.   90 Days = 1-25-24.    Assessment:  Patient presents with chronic RLQ pain and PFD affecting her bladder.  Problems include decreased posture, decreased pelvic alignment/sacral position, decreased soft tissue condition, decreased left trunk SB, decreased right hip ROM/Flexibility.    Response to treatment-  Improved alignment.  Increased knowledge and understanding.      Goals:   Patient will report decrease pain by  75% overall.    2.    Patient will reports PM voiding frequency to 1-2x.  3.    Patient will report decreased episodes of leaking by 90% overall.    4.    Increase pelvic symmetry/sacral position/soft tissue condition to good.   5.    Increase trunk mobility left SB to WNL without pulling in right hip.    Increase RLE flexibility to equal LLE.    6.    Patient will demonstrate good TA control with Level 3 exercises.    7.    Patient will demonstrate the ability to contract-relax-and eccentrically lengthen her pelvic floor.    8.    Patient will be knowledgeable of healthy bladder habits.    9.    Patient will demonstrate good diaphragm excursion/lateral rib expansion.    10.  NIH-CPSI =  2 or less.     11.  Patient will be independent with a home exercise program.       Plan:  Treatment/Interventions: Biofeedback, Education/ Instruction, Manual therapy, Neuromuscular re-education, Therapeutic exercises  PT Plan: Skilled PT  PT Frequency: 1 time per week  Duration: 10 weeks, 10 visits  Certification Period Start Date: 11/02/23  Certification Period End Date: 01/25/24  Rehab Potential: Good  Plan of Care Agreement: Patient    Current Problem:   1. Pelvic pain        2. Other specified disorders of muscle  Referral to Physical Therapy    Follow Up In Physical Therapy      3. Pelvic and perineal pain   Referral to Physical Therapy    Follow Up In Physical Therapy      4. Pelvic floor dysfunction            Subjective    COMPLAINT/REPORT:  Patient has had RLQ pain for 20 years that increases with exercise and UTI like symptoms.  She now also has some incontinence.                            Testing-  CT Urogram.    Bowel-  No complaints.                           Bladder-  Daytime frequency 1x q 2-4 hours.  If she is having UTI like symptoms, she does not empty.  Stress incontinence rare.  PM 2-3x.  Urge incontinence in the evening 3-4x/week.      [place]-  n/a.                            Precautions/PMH:  HTN.  Asthma.  UTI's.  2 C-Sections ('81, '83).  Scar tissue removed ('98).  Vaginal hysterectomy ('03).  Right foot fracture as a teenager, NWB for 6 weeks.    Precautions  STEADI Fall Risk Score (The score of 4 or more indicates an increased risk of falling): 0  Precautions Comment: No recent falls.     Pain:  Pain Assessment  Pain Assessment: 0-10  Pain Score: 3 RLQ daily average.  Increases to 7 with exercise and UTI like symptoms.   LBP with UTI like symptoms 7.     Outcome Measures:  Other Measures  Other Outcome Measures: NIH-CPSI = 6.        Objective   INSPECTION:  Posture-  Increased lumbar lordosis.  Right leg ER.                           Gait/Stairs-  No device.                           Breathing-  Decreased lateral rib expansion/diaphragm excursion.     TRUNK MOBILITY:  Poor left SB (no spinal C-Curve).  Causes pulling in RLQ/R hip.                                     PELVIS/SI:  Stand-  ASIS R high, L low.  PSIS R low, L high.                    Sit-  Iliac crest/PSIS R low.  Right sacral rotation.  Right femur looks long.                    Supine-  ASIS R low, L high.  RLE looks long.  Pubes (=).                     Prone-  PSIS R high, L low.  Right ischium high.  THERESA (=).    ROM/FLEXIBILITY:  LE's supine-  R SLR 70, R glut/piriformis/Hip IR Mod decrease.  R adductors/Hip ER Min decrease.   LLE WNL.  Prone-  n/a.                                     STRENGTH:  MMT-  seated LE's  4-5/5.                         Isolated Contractions-  Poor.                       Resting Tone-  n/a.                        Functional-  Difficulty with PPT.      PALPATION:  Internal- n/a.  External-  Poor myofascial mobility lumbar paraspinals. Tightness along pubes, right ilium, left THERESA/obturator (reproduces RLQ pain), right iliacus, and right side of scar.      OTHER/SPECIAL:  Q#- n/a.     Treatments:  TherX- Education initiated regarding healthy bladder habits.      Education initiated on the core/PF function.  Global vs. Local muscles.  Fast vs. Slow contractions.  Instructed in and performed seated pelvic tilts and diaphragmatic breathing.  Handout issued.      MTT- Pelvic Balancing in supine and prone.                                            Current Participants as of 11/5/2023    Name Type Comments Contact Info    Tony Benitez MD Referring Provider  [phone]    Signature pending    Ayse Riley PT Physical Therapist  [phone]    Signature pending

## 2023-11-02 NOTE — LETTER
November 5, 2023     Patient: Sherrie Chan   YOB: 1955   Date of Visit: 11/2/2023       To Whom It May Concern:    It is my medical opinion that Sherrie Chan {Work release (duty restriction):87172}.    If you have any questions or concerns, please don't hesitate to call.         Sincerely,        Ayse Riley, PT    CC: No Recipients

## 2023-11-03 ENCOUNTER — TELEPHONE (OUTPATIENT)
Dept: PRIMARY CARE | Facility: CLINIC | Age: 68
End: 2023-11-03
Payer: MEDICARE

## 2023-11-03 NOTE — TELEPHONE ENCOUNTER
----- Message from Eufemia Ascencio DO sent at 11/3/2023  8:53 AM EDT -----  Call patient her labs look good  Her chol has come down 50+ point  Is she using the repatha?

## 2023-11-05 PROBLEM — M62.89 PELVIC FLOOR DYSFUNCTION: Status: ACTIVE | Noted: 2023-11-05

## 2023-11-05 ASSESSMENT — ENCOUNTER SYMPTOMS
DEPRESSION: 0
LOSS OF SENSATION IN FEET: 0
OCCASIONAL FEELINGS OF UNSTEADINESS: 0

## 2023-11-05 ASSESSMENT — PAIN - FUNCTIONAL ASSESSMENT: PAIN_FUNCTIONAL_ASSESSMENT: 0-10

## 2023-11-05 ASSESSMENT — PAIN SCALES - GENERAL: PAINLEVEL_OUTOF10: 3

## 2023-11-05 NOTE — PROGRESS NOTES
Physical Therapy    Physical Therapy Evaluation and Treatment      Patient Name: Sherrie Chan  MRN: [id number]  Today's Date: 11-2-23  Time Calculation  Start Time: 0800  Stop Time: 0900  Time Calculation (min): 60 min    Visit 1/10 per poc.   90 Days = 1-25-24.    Assessment:  Patient presents with chronic RLQ pain and PFD affecting her bladder.  Problems include decreased posture, decreased pelvic alignment/sacral position, decreased soft tissue condition, decreased left trunk SB, decreased right hip ROM/Flexibility.    Response to treatment-  Improved alignment.  Increased knowledge and understanding.      Goals:   Patient will report decrease pain by  75% overall.    2.    Patient will reports PM voiding frequency to 1-2x.  3.    Patient will report decreased episodes of leaking by 90% overall.    4.    Increase pelvic symmetry/sacral position/soft tissue condition to good.   5.    Increase trunk mobility left SB to WNL without pulling in right hip.    Increase RLE flexibility to equal LLE.    6.    Patient will demonstrate good TA control with Level 3 exercises.    7.    Patient will demonstrate the ability to contract-relax-and eccentrically lengthen her pelvic floor.    8.    Patient will be knowledgeable of healthy bladder habits.    9.    Patient will demonstrate good diaphragm excursion/lateral rib expansion.    10.  NIH-CPSI =  2 or less.     11.  Patient will be independent with a home exercise program.       Plan:  Treatment/Interventions: Biofeedback, Education/ Instruction, Manual therapy, Neuromuscular re-education, Therapeutic exercises  PT Plan: Skilled PT  PT Frequency: 1 time per week  Duration: 10 weeks, 10 visits  Certification Period Start Date: 11/02/23  Certification Period End Date: 01/25/24  Rehab Potential: Good  Plan of Care Agreement: Patient    Current Problem:   1. Pelvic pain        2. Other specified disorders of muscle  Referral to Physical Therapy    Follow Up In Physical  Therapy      3. Pelvic and perineal pain  Referral to Physical Therapy    Follow Up In Physical Therapy      4. Pelvic floor dysfunction            Subjective    COMPLAINT/REPORT:  Patient has had RLQ pain for 20 years that increases with exercise and UTI like symptoms.  She now also has some incontinence.                            Testing-  CT Urogram.    Bowel-  No complaints.                           Bladder-  Daytime frequency 1x q 2-4 hours.  If she is having UTI like symptoms, she does not empty.  Stress incontinence rare.  PM 2-3x.  Urge incontinence in the evening 3-4x/week.      [place]-  n/a.                            Precautions/PMH:  HTN.  Asthma.  UTI's.  2 C-Sections ('81, '83).  Scar tissue removed ('98).  Vaginal hysterectomy ('03).  Right foot fracture as a teenager, NWB for 6 weeks.    Precautions  STEADI Fall Risk Score (The score of 4 or more indicates an increased risk of falling): 0  Precautions Comment: No recent falls.     Pain:  Pain Assessment  Pain Assessment: 0-10  Pain Score: 3 RLQ daily average.  Increases to 7 with exercise and UTI like symptoms.   LBP with UTI like symptoms 7.     Outcome Measures:  Other Measures  Other Outcome Measures: NIH-CPSI = 6.        Objective   INSPECTION:  Posture-  Increased lumbar lordosis.  Right leg ER.                           Gait/Stairs-  No device.                           Breathing-  Decreased lateral rib expansion/diaphragm excursion.     TRUNK MOBILITY:  Poor left SB (no spinal C-Curve).  Causes pulling in RLQ/R hip.                                     PELVIS/SI:  Stand-  ASIS R high, L low.  PSIS R low, L high.                    Sit-  Iliac crest/PSIS R low.  Right sacral rotation.  Right femur looks long.                    Supine-  ASIS R low, L high.  RLE looks long.  Pubes (=).                     Prone-  PSIS R high, L low.  Right ischium high.  THERESA (=).    ROM/FLEXIBILITY:  LE's supine-  R SLR 70, R glut/piriformis/Hip IR Mod  decrease.  R adductors/Hip ER Min decrease.  LLE WNL.  Prone-  n/a.                                     STRENGTH:  MMT-  seated LE's  4-5/5.                         Isolated Contractions-  Poor.                       Resting Tone-  n/a.                        Functional-  Difficulty with PPT.      PALPATION:  Internal- n/a.  External-  Poor myofascial mobility lumbar paraspinals. Tightness along pubes, right ilium, left THERESA/obturator (reproduces RLQ pain), right iliacus, and right side of scar.      OTHER/SPECIAL:  Q#- n/a.     Treatments:  TherX- Education initiated regarding healthy bladder habits.      Education initiated on the core/PF function.  Global vs. Local muscles.  Fast vs. Slow contractions.  Instructed in and performed seated pelvic tilts and diaphragmatic breathing.  Handout issued.      MTT- Pelvic Balancing in supine and prone.

## 2023-11-07 ENCOUNTER — TREATMENT (OUTPATIENT)
Dept: PHYSICAL THERAPY | Facility: CLINIC | Age: 68
End: 2023-11-07
Payer: MEDICARE

## 2023-11-07 DIAGNOSIS — R10.2 PELVIC AND PERINEAL PAIN: ICD-10-CM

## 2023-11-07 DIAGNOSIS — M62.89 PELVIC FLOOR DYSFUNCTION: ICD-10-CM

## 2023-11-07 DIAGNOSIS — M62.89 OTHER SPECIFIED DISORDERS OF MUSCLE: ICD-10-CM

## 2023-11-07 DIAGNOSIS — R10.2 PELVIC PAIN: Primary | ICD-10-CM

## 2023-11-07 PROCEDURE — 97110 THERAPEUTIC EXERCISES: CPT | Mod: GP | Performed by: PHYSICAL THERAPIST

## 2023-11-07 PROCEDURE — 97140 MANUAL THERAPY 1/> REGIONS: CPT | Mod: GP | Performed by: PHYSICAL THERAPIST

## 2023-11-07 ASSESSMENT — PAIN SCALES - GENERAL: PAINLEVEL_OUTOF10: 7

## 2023-11-07 ASSESSMENT — PAIN - FUNCTIONAL ASSESSMENT: PAIN_FUNCTIONAL_ASSESSMENT: 0-10

## 2023-11-07 NOTE — PROGRESS NOTES
Physical Therapy    Physical Therapy Treatment    Patient Name: Sherrie Chan  MRN: [id number]  Today's Date: 11/7/2023  Time Calculation  Start Time: 1200  Stop Time: 1300  Time Calculation (min): 60 min  Visit 2/10  90 Days = 1-25-24      Assessment:  Symptoms decreased to 3-4/10 after treatment.       Plan:  Continue to progress as tolerated.         Current Problem  1. Pelvic pain        2. Other specified disorders of muscle  Follow Up In Physical Therapy      3. Pelvic and perineal pain  Follow Up In Physical Therapy      4. Pelvic floor dysfunction            Subjective   Patient reports increased pain after eval.  Today she is not feeling well due to having UTI-like symptoms, which were present at eval, but are worse today.       Precautions  Precautions  STEADI Fall Risk Score (The score of 4 or more indicates an increased risk of falling): 0  Precautions Comment: No recent falls.     Pain  Pain Assessment: 0-10  Pain Score: 7 RLQ, Bladder, LB.    Objective    Palpation-  symptom reproduction greatest with MTT to lower abdomen and scar.  Left diaphragm caused back pain.  Bilateral iliacus and right psoas also tight and painful.     Treatments:  TherX (15 min)-   Reviewed healthy bladder habits.    Reviewed core/PF function.  Global vs. Local muscles.  Fast vs. Slow contractions.    ANS and the core/PF.  Patient can perform 3-5 breaths up to q hour as needed.      Practiced seated pelvic tilts and diaphragmatic breathing in [place].      Instructed in self abdominal and scar (upward strokes) to be performed nightly.      MTT (45 min)-  [place]:  Sandwiching/crossed hand releases to torso.  STM to diaphragm, hip flexors, and abdominals.  TP release at pubes.  Scar mobs.  Skin rolling across lower abdomen.

## 2023-11-08 ENCOUNTER — PHARMACY VISIT (OUTPATIENT)
Dept: PHARMACY | Facility: CLINIC | Age: 68
End: 2023-11-08
Payer: COMMERCIAL

## 2023-11-08 ENCOUNTER — HOSPITAL ENCOUNTER (OUTPATIENT)
Dept: CARDIOLOGY | Facility: HOSPITAL | Age: 68
Discharge: HOME | End: 2023-11-08
Payer: MEDICARE

## 2023-11-08 ENCOUNTER — SPECIALTY PHARMACY (OUTPATIENT)
Dept: PHARMACY | Facility: CLINIC | Age: 68
End: 2023-11-08

## 2023-11-08 ENCOUNTER — APPOINTMENT (OUTPATIENT)
Dept: RADIOLOGY | Facility: HOSPITAL | Age: 68
End: 2023-11-08
Payer: MEDICARE

## 2023-11-08 ENCOUNTER — TELEPHONE (OUTPATIENT)
Dept: PRIMARY CARE | Facility: CLINIC | Age: 68
End: 2023-11-08

## 2023-11-08 ENCOUNTER — HOSPITAL ENCOUNTER (OUTPATIENT)
Facility: HOSPITAL | Age: 68
Setting detail: OBSERVATION
Discharge: HOME | End: 2023-11-11
Attending: EMERGENCY MEDICINE | Admitting: STUDENT IN AN ORGANIZED HEALTH CARE EDUCATION/TRAINING PROGRAM
Payer: MEDICARE

## 2023-11-08 DIAGNOSIS — G45.9 TIA (TRANSIENT ISCHEMIC ATTACK): Primary | ICD-10-CM

## 2023-11-08 LAB
ALBUMIN SERPL BCP-MCNC: 4.8 G/DL (ref 3.4–5)
ALP SERPL-CCNC: 48 U/L (ref 33–136)
ALT SERPL W P-5'-P-CCNC: 16 U/L (ref 7–45)
ANION GAP SERPL CALC-SCNC: 14 MMOL/L (ref 10–20)
APPEARANCE UR: CLEAR
AST SERPL W P-5'-P-CCNC: 17 U/L (ref 9–39)
BASOPHILS # BLD AUTO: 0.05 X10*3/UL (ref 0–0.1)
BASOPHILS NFR BLD AUTO: 0.6 %
BILIRUB SERPL-MCNC: 0.3 MG/DL (ref 0–1.2)
BILIRUB UR STRIP.AUTO-MCNC: NEGATIVE MG/DL
BUN SERPL-MCNC: 20 MG/DL (ref 6–23)
CALCIUM SERPL-MCNC: 10.5 MG/DL (ref 8.6–10.3)
CHLORIDE SERPL-SCNC: 98 MMOL/L (ref 98–107)
CO2 SERPL-SCNC: 31 MMOL/L (ref 21–32)
COLOR UR: YELLOW
CREAT SERPL-MCNC: 0.88 MG/DL (ref 0.5–1.05)
EOSINOPHIL # BLD AUTO: 0.09 X10*3/UL (ref 0–0.7)
EOSINOPHIL NFR BLD AUTO: 1.1 %
ERYTHROCYTE [DISTWIDTH] IN BLOOD BY AUTOMATED COUNT: 14.5 % (ref 11.5–14.5)
GFR SERPL CREATININE-BSD FRML MDRD: 71 ML/MIN/1.73M*2
GLUCOSE SERPL-MCNC: 93 MG/DL (ref 74–99)
GLUCOSE UR STRIP.AUTO-MCNC: NEGATIVE MG/DL
HCT VFR BLD AUTO: 44.6 % (ref 36–46)
HGB BLD-MCNC: 14.3 G/DL (ref 12–16)
IMM GRANULOCYTES # BLD AUTO: 0.03 X10*3/UL (ref 0–0.7)
IMM GRANULOCYTES NFR BLD AUTO: 0.4 % (ref 0–0.9)
KETONES UR STRIP.AUTO-MCNC: NEGATIVE MG/DL
LEUKOCYTE ESTERASE UR QL STRIP.AUTO: ABNORMAL
LYMPHOCYTES # BLD AUTO: 1.77 X10*3/UL (ref 1.2–4.8)
LYMPHOCYTES NFR BLD AUTO: 22.6 %
MCH RBC QN AUTO: 27.7 PG (ref 26–34)
MCHC RBC AUTO-ENTMCNC: 32.1 G/DL (ref 32–36)
MCV RBC AUTO: 86 FL (ref 80–100)
MONOCYTES # BLD AUTO: 0.62 X10*3/UL (ref 0.1–1)
MONOCYTES NFR BLD AUTO: 7.9 %
NEUTROPHILS # BLD AUTO: 5.28 X10*3/UL (ref 1.2–7.7)
NEUTROPHILS NFR BLD AUTO: 67.4 %
NITRITE UR QL STRIP.AUTO: NEGATIVE
NRBC BLD-RTO: 0 /100 WBCS (ref 0–0)
PH UR STRIP.AUTO: 5 [PH]
PLATELET # BLD AUTO: 281 X10*3/UL (ref 150–450)
POTASSIUM SERPL-SCNC: 3.5 MMOL/L (ref 3.5–5.3)
PROT SERPL-MCNC: 8.3 G/DL (ref 6.4–8.2)
PROT UR STRIP.AUTO-MCNC: NEGATIVE MG/DL
RBC # BLD AUTO: 5.17 X10*6/UL (ref 4–5.2)
RBC # UR STRIP.AUTO: ABNORMAL /UL
RBC #/AREA URNS AUTO: NORMAL /HPF
SODIUM SERPL-SCNC: 139 MMOL/L (ref 136–145)
SP GR UR STRIP.AUTO: 1.01
UROBILINOGEN UR STRIP.AUTO-MCNC: <2 MG/DL
WBC # BLD AUTO: 7.8 X10*3/UL (ref 4.4–11.3)
WBC #/AREA URNS AUTO: NORMAL /HPF

## 2023-11-08 PROCEDURE — 83036 HEMOGLOBIN GLYCOSYLATED A1C: CPT | Mod: STJLAB | Performed by: STUDENT IN AN ORGANIZED HEALTH CARE EDUCATION/TRAINING PROGRAM

## 2023-11-08 PROCEDURE — 81001 URINALYSIS AUTO W/SCOPE: CPT | Performed by: STUDENT IN AN ORGANIZED HEALTH CARE EDUCATION/TRAINING PROGRAM

## 2023-11-08 PROCEDURE — 80053 COMPREHEN METABOLIC PANEL: CPT | Performed by: EMERGENCY MEDICINE

## 2023-11-08 PROCEDURE — RXMED WILLOW AMBULATORY MEDICATION CHARGE

## 2023-11-08 PROCEDURE — 70450 CT HEAD/BRAIN W/O DYE: CPT | Performed by: RADIOLOGY

## 2023-11-08 PROCEDURE — 85025 COMPLETE CBC W/AUTO DIFF WBC: CPT | Performed by: EMERGENCY MEDICINE

## 2023-11-08 PROCEDURE — 99285 EMERGENCY DEPT VISIT HI MDM: CPT | Mod: 25 | Performed by: EMERGENCY MEDICINE

## 2023-11-08 PROCEDURE — 70551 MRI BRAIN STEM W/O DYE: CPT

## 2023-11-08 PROCEDURE — 93010 ELECTROCARDIOGRAM REPORT: CPT | Performed by: EMERGENCY MEDICINE

## 2023-11-08 PROCEDURE — G0378 HOSPITAL OBSERVATION PER HR: HCPCS

## 2023-11-08 PROCEDURE — 93005 ELECTROCARDIOGRAM TRACING: CPT

## 2023-11-08 PROCEDURE — 87086 URINE CULTURE/COLONY COUNT: CPT | Mod: STJLAB | Performed by: STUDENT IN AN ORGANIZED HEALTH CARE EDUCATION/TRAINING PROGRAM

## 2023-11-08 PROCEDURE — 70544 MR ANGIOGRAPHY HEAD W/O DYE: CPT | Mod: 59

## 2023-11-08 PROCEDURE — 99285 EMERGENCY DEPT VISIT HI MDM: CPT | Performed by: EMERGENCY MEDICINE

## 2023-11-08 PROCEDURE — 70551 MRI BRAIN STEM W/O DYE: CPT | Performed by: RADIOLOGY

## 2023-11-08 PROCEDURE — 2500000001 HC RX 250 WO HCPCS SELF ADMINISTERED DRUGS (ALT 637 FOR MEDICARE OP): Performed by: STUDENT IN AN ORGANIZED HEALTH CARE EDUCATION/TRAINING PROGRAM

## 2023-11-08 PROCEDURE — 70544 MR ANGIOGRAPHY HEAD W/O DYE: CPT | Performed by: RADIOLOGY

## 2023-11-08 PROCEDURE — 36415 COLL VENOUS BLD VENIPUNCTURE: CPT | Performed by: EMERGENCY MEDICINE

## 2023-11-08 PROCEDURE — 70547 MR ANGIOGRAPHY NECK W/O DYE: CPT | Performed by: RADIOLOGY

## 2023-11-08 PROCEDURE — 70450 CT HEAD/BRAIN W/O DYE: CPT

## 2023-11-08 PROCEDURE — 81001 URINALYSIS AUTO W/SCOPE: CPT | Performed by: EMERGENCY MEDICINE

## 2023-11-08 PROCEDURE — 99223 1ST HOSP IP/OBS HIGH 75: CPT | Performed by: STUDENT IN AN ORGANIZED HEALTH CARE EDUCATION/TRAINING PROGRAM

## 2023-11-08 PROCEDURE — 96372 THER/PROPH/DIAG INJ SC/IM: CPT | Performed by: STUDENT IN AN ORGANIZED HEALTH CARE EDUCATION/TRAINING PROGRAM

## 2023-11-08 PROCEDURE — 96375 TX/PRO/DX INJ NEW DRUG ADDON: CPT

## 2023-11-08 PROCEDURE — 96365 THER/PROPH/DIAG IV INF INIT: CPT

## 2023-11-08 PROCEDURE — 70547 MR ANGIOGRAPHY NECK W/O DYE: CPT

## 2023-11-08 PROCEDURE — 2500000004 HC RX 250 GENERAL PHARMACY W/ HCPCS (ALT 636 FOR OP/ED): Performed by: STUDENT IN AN ORGANIZED HEALTH CARE EDUCATION/TRAINING PROGRAM

## 2023-11-08 RX ORDER — ENOXAPARIN SODIUM 100 MG/ML
40 INJECTION SUBCUTANEOUS EVERY 24 HOURS
Status: DISCONTINUED | OUTPATIENT
Start: 2023-11-08 | End: 2023-11-10

## 2023-11-08 RX ORDER — HYDROCHLOROTHIAZIDE 25 MG/1
25 TABLET ORAL DAILY
Status: DISCONTINUED | OUTPATIENT
Start: 2023-11-09 | End: 2023-11-11 | Stop reason: HOSPADM

## 2023-11-08 RX ORDER — LISINOPRIL 10 MG/1
10 TABLET ORAL DAILY
Status: DISCONTINUED | OUTPATIENT
Start: 2023-11-09 | End: 2023-11-11 | Stop reason: HOSPADM

## 2023-11-08 RX ORDER — CHOLECALCIFEROL (VITAMIN D3) 25 MCG
2000 TABLET ORAL DAILY
Status: DISCONTINUED | OUTPATIENT
Start: 2023-11-09 | End: 2023-11-11 | Stop reason: HOSPADM

## 2023-11-08 RX ORDER — ACETAMINOPHEN 325 MG/1
650 TABLET ORAL EVERY 6 HOURS PRN
Status: DISCONTINUED | OUTPATIENT
Start: 2023-11-08 | End: 2023-11-11 | Stop reason: HOSPADM

## 2023-11-08 RX ORDER — DOCUSATE SODIUM 100 MG/1
100 CAPSULE, LIQUID FILLED ORAL 2 TIMES DAILY
Status: DISCONTINUED | OUTPATIENT
Start: 2023-11-08 | End: 2023-11-11 | Stop reason: HOSPADM

## 2023-11-08 RX ORDER — ASPIRIN 81 MG/1
81 TABLET ORAL DAILY
Status: DISCONTINUED | OUTPATIENT
Start: 2023-11-09 | End: 2023-11-11 | Stop reason: HOSPADM

## 2023-11-08 RX ORDER — LORAZEPAM 2 MG/ML
1 INJECTION INTRAMUSCULAR ONCE AS NEEDED
Status: COMPLETED | OUTPATIENT
Start: 2023-11-08 | End: 2023-11-08

## 2023-11-08 RX ORDER — AMLODIPINE BESYLATE 10 MG/1
10 TABLET ORAL DAILY
Status: DISCONTINUED | OUTPATIENT
Start: 2023-11-09 | End: 2023-11-11 | Stop reason: HOSPADM

## 2023-11-08 RX ORDER — CEFTRIAXONE 1 G/50ML
1 INJECTION, SOLUTION INTRAVENOUS EVERY 24 HOURS
Status: COMPLETED | OUTPATIENT
Start: 2023-11-08 | End: 2023-11-10

## 2023-11-08 RX ADMIN — ACETAMINOPHEN 650 MG: 325 TABLET ORAL at 21:38

## 2023-11-08 RX ADMIN — CEFTRIAXONE SODIUM 1 G: 1 INJECTION, SOLUTION INTRAVENOUS at 21:30

## 2023-11-08 RX ADMIN — LORAZEPAM 1 MG: 2 INJECTION, SOLUTION INTRAMUSCULAR; INTRAVENOUS at 18:33

## 2023-11-08 RX ADMIN — Medication 1 CAPSULE: at 21:30

## 2023-11-08 RX ADMIN — ENOXAPARIN SODIUM 40 MG: 40 INJECTION SUBCUTANEOUS at 21:30

## 2023-11-08 SDOH — SOCIAL STABILITY: SOCIAL INSECURITY: DO YOU FEEL ANYONE HAS EXPLOITED OR TAKEN ADVANTAGE OF YOU FINANCIALLY OR OF YOUR PERSONAL PROPERTY?: NO

## 2023-11-08 SDOH — SOCIAL STABILITY: SOCIAL INSECURITY: ABUSE: ADULT

## 2023-11-08 SDOH — SOCIAL STABILITY: SOCIAL INSECURITY: DO YOU FEEL UNSAFE GOING BACK TO THE PLACE WHERE YOU ARE LIVING?: NO

## 2023-11-08 SDOH — SOCIAL STABILITY: SOCIAL INSECURITY: HAS ANYONE EVER THREATENED TO HURT YOUR FAMILY OR YOUR PETS?: NO

## 2023-11-08 SDOH — SOCIAL STABILITY: SOCIAL INSECURITY: DOES ANYONE TRY TO KEEP YOU FROM HAVING/CONTACTING OTHER FRIENDS OR DOING THINGS OUTSIDE YOUR HOME?: NO

## 2023-11-08 SDOH — SOCIAL STABILITY: SOCIAL INSECURITY: HAVE YOU HAD THOUGHTS OF HARMING ANYONE ELSE?: YES

## 2023-11-08 SDOH — SOCIAL STABILITY: SOCIAL INSECURITY: ARE YOU OR HAVE YOU BEEN THREATENED OR ABUSED PHYSICALLY, EMOTIONALLY, OR SEXUALLY BY ANYONE?: NO

## 2023-11-08 ASSESSMENT — PAIN SCALES - GENERAL
PAINLEVEL_OUTOF10: 5 - MODERATE PAIN
PAINLEVEL_OUTOF10: 0 - NO PAIN

## 2023-11-08 ASSESSMENT — COGNITIVE AND FUNCTIONAL STATUS - GENERAL
MOBILITY SCORE: 24
DAILY ACTIVITIY SCORE: 24
DAILY ACTIVITIY SCORE: 24
PATIENT BASELINE BEDBOUND: NO
MOBILITY SCORE: 24

## 2023-11-08 ASSESSMENT — LIFESTYLE VARIABLES
HOW OFTEN DO YOU HAVE A DRINK CONTAINING ALCOHOL: NEVER
EVER FELT BAD OR GUILTY ABOUT YOUR DRINKING: NO
EVER HAD A DRINK FIRST THING IN THE MORNING TO STEADY YOUR NERVES TO GET RID OF A HANGOVER: NO
HOW MANY STANDARD DRINKS CONTAINING ALCOHOL DO YOU HAVE ON A TYPICAL DAY: PATIENT DOES NOT DRINK
REASON UNABLE TO ASSESS: NO
SUBSTANCE_ABUSE_PAST_12_MONTHS: NO
HAVE YOU EVER FELT YOU SHOULD CUT DOWN ON YOUR DRINKING: NO
AUDIT-C TOTAL SCORE: 0
AUDIT-C TOTAL SCORE: 0
HAVE PEOPLE ANNOYED YOU BY CRITICIZING YOUR DRINKING: NO
HOW OFTEN DO YOU HAVE 6 OR MORE DRINKS ON ONE OCCASION: NEVER
SKIP TO QUESTIONS 9-10: 1

## 2023-11-08 ASSESSMENT — COLUMBIA-SUICIDE SEVERITY RATING SCALE - C-SSRS
2. HAVE YOU ACTUALLY HAD ANY THOUGHTS OF KILLING YOURSELF?: NO
1. IN THE PAST MONTH, HAVE YOU WISHED YOU WERE DEAD OR WISHED YOU COULD GO TO SLEEP AND NOT WAKE UP?: NO
1. IN THE PAST MONTH, HAVE YOU WISHED YOU WERE DEAD OR WISHED YOU COULD GO TO SLEEP AND NOT WAKE UP?: NO
6. HAVE YOU EVER DONE ANYTHING, STARTED TO DO ANYTHING, OR PREPARED TO DO ANYTHING TO END YOUR LIFE?: NO
2. HAVE YOU ACTUALLY HAD ANY THOUGHTS OF KILLING YOURSELF?: NO
6. HAVE YOU EVER DONE ANYTHING, STARTED TO DO ANYTHING, OR PREPARED TO DO ANYTHING TO END YOUR LIFE?: NO

## 2023-11-08 ASSESSMENT — ACTIVITIES OF DAILY LIVING (ADL)
GROOMING: INDEPENDENT
JUDGMENT_ADEQUATE_SAFELY_COMPLETE_DAILY_ACTIVITIES: YES
ADEQUATE_TO_COMPLETE_ADL: YES
HEARING - LEFT EAR: FUNCTIONAL
BATHING: INDEPENDENT
LACK_OF_TRANSPORTATION: NO
TOILETING: INDEPENDENT
DRESSING YOURSELF: INDEPENDENT
WALKS IN HOME: INDEPENDENT
PATIENT'S MEMORY ADEQUATE TO SAFELY COMPLETE DAILY ACTIVITIES?: YES
HEARING - RIGHT EAR: FUNCTIONAL
FEEDING YOURSELF: INDEPENDENT

## 2023-11-08 ASSESSMENT — PAIN - FUNCTIONAL ASSESSMENT
PAIN_FUNCTIONAL_ASSESSMENT: 0-10
PAIN_FUNCTIONAL_ASSESSMENT: 0-10

## 2023-11-08 ASSESSMENT — PATIENT HEALTH QUESTIONNAIRE - PHQ9
SUM OF ALL RESPONSES TO PHQ9 QUESTIONS 1 & 2: 0
2. FEELING DOWN, DEPRESSED OR HOPELESS: NOT AT ALL
1. LITTLE INTEREST OR PLEASURE IN DOING THINGS: NOT AT ALL

## 2023-11-08 NOTE — ED PROVIDER NOTES
HPI   Chief Complaint   Patient presents with    Urinary Frequency     Patient states UTI symptoms on and off for a few months. Symptoms getting worse started yesterday. Patient noticed some confusion yesterday afternoon (12:00) while at physical therapy       HPI    68-year-old female presents emergency room with chief complaint urinary frequency.  Patient indicates she has had an ongoing UTI for multiple years as of yesterday she has felt that she had increased blood pressure, confusion, chills, headache, urgency, back pain, difficulty reading, could also not sign her name and felt dizzy as if the room was spinning.  She is been on antibiotics for 10 years off and on most recently Augmentin she been off it for the past 3 months now.    PMHx: Hypertension high cholesterol  PSHx: C section, hysterectomy  FHx: Mother had high blood pressure father had heart disease  SocHX:     EtOH: none      Tobacco:  none     Other illicits:  none  Allergies: Nitrofurantoin, statin and, sulfamethoxazole     ROS  Constitutional:      Denies: Fever, fatigue     Reports: Chills  Neuro:      Denies: Headache, numbness, tingling     Reports: Confusion  Psych:      Denies: anorexia, SI, HI     Reports: None  HEENT:      Denies: vision change, congestion, sore throat     Reports: Ear pain primarily on the left.  Cardiovascular:      Denies: Chest pain, palpitations, orthopnea     Reports: None  Pulmonary:      Denies: shortness of breath, cough, hemoptysis     Reports: None  Gastrointestinal:      Denies: Abdominal pain, nausea, vomiting, constipation, diarrhea, bright red stools, black stools     Reports: None  Genitourinary:      Denies: Flank pain, painful urination, discharge     Reports: Frequent urination urge incontinence.  Musculoskeletal:     Denies: loss of ROM, extremity pain,     Reports: Patient endorses back pain primarily on the right side of the ribs  Integumentary:     Denies: Rash, itching     Reports: None    PHYSICAL  EXAM  Constitutional: Well appearing, no acute distress, oriented to person/place/time daughter at the bedside  Psych: Age appropriate insight, judgement, no psychomotor agitation  Neuro: GCS 15, spontaneously moves all extremities  Head: Atraumatic, no vinson sign, no raccoon eyes  Eyes: Spontaneously open, PERRL, EOMI, no scleral icterus or conjunctival injection  ENT: No gross deformation, mucous membranes moist, trachea midline, no uvular deviation no bulging tympanic membrane normal light reflex.  Neck: Supple, nontender, no ROM restriction, no JVD  Respiratory:  respirations nonlabored, equal chest rise, no wheezes rales or rhonchi  Cardiovascular: Regular rate and rhythm,   distal pulses 2+ symmetric  Gastrointestinal: No gross deformation, normal bowel sounds, soft, nontender, nondistended  Musculoskeletal: Normal Appearance, Full ROM of extremities, no tenderness, no edema  Integumentary: Warm, dry, no rashes    MDM/ED Course  68-year-old female presents emergency room with chief complaint of urinary frequency.  Medical management and treatment the emergency room will consist of CT Noncon of the head as well as urinalysis and basic lab work.  Urinalysis revealed moderate blood blood with positive for trace leukocyte esterase she had mild hypercalcemia at 10.5 CT of the head revealed no acute intracranial findings.  Conversation was had with neurology Dr. Radha carrasco regarding the patient's episode yesterday he indicated his recommendation was admission for continued work-up and MRI.  Has been admitted to Dr. Galvez  Chronic conditions: See chart review    External records review: inpatient notes, outpatient records  History obtained from: patient/chart review    Consultant discussions: Conversation was had with neurology recommendation was for admission and MRI for continued work-up of TIA.        I reviewed the case with the attending ED physician. The attending ED physician agrees with the plan. Patient  and/or patient´s representative was counseled regarding labs, imaging, likely diagnosis, and plan. All questions were answered.  Giovani Montague MD  PGY-1  Emergency Medicine      Please excuse any misspellings or unintended errors related to the Dragon speech recognition software used to dictate this note.                [place] Coma Scale Score: 15                  Patient History   Past Medical History:   Diagnosis Date    COVID-19 2021    COVID-19    Inflammatory polyarthropathy (CMS/HCC) 2021    Inflammatory polyarthritis    Personal history of other diseases of the respiratory system     History of asthma    Personal history of other mental and behavioral disorders     History of anxiety disorder     Past Surgical History:   Procedure Laterality Date     SECTION, CLASSIC  2017     Section    OTHER SURGICAL HISTORY  10/01/2019    Hysterectomy    OTHER SURGICAL HISTORY  10/01/2019    Tubal ligation     Family History   Problem Relation Name Age of Onset    Hypertension Mother      Other (cardiac disorder) Father      COPD Father      Coronary artery disease Sister       Social History     Tobacco Use    Smoking status: Never    Smokeless tobacco: Never   Vaping Use    Vaping Use: Never used   Substance Use Topics    Alcohol use: Not Currently    Drug use: Not Currently       Physical Exam   ED Triage Vitals [23 1137]   Temp Heart Rate Resp BP   36.2 °C (97.2 °F) 87 18 163/77      SpO2 Temp Source Heart Rate Source Patient Position   97 % Temporal Monitor Sitting      BP Location FiO2 (%)     Right arm --       Physical Exam    ED Course & MDM   Diagnoses as of 23 1555   TIA (transient ischemic attack)       Medical Decision Making      Procedure  Procedures     Giovani Montague MD  Resident  [id number]

## 2023-11-08 NOTE — ED PROVIDER NOTES
HPI   Chief Complaint   Patient presents with    Urinary Frequency     Patient states UTI symptoms on and off for a few months. Symptoms getting worse started yesterday. Patient noticed some confusion yesterday afternoon (12:00) while at physical therapy       HPI                    [place] Coma Scale Score: 15                  Patient History   Past Medical History:   Diagnosis Date    COVID-19 2021    COVID-19    Inflammatory polyarthropathy (CMS/HCC) 2021    Inflammatory polyarthritis    Personal history of other diseases of the respiratory system     History of asthma    Personal history of other mental and behavioral disorders     History of anxiety disorder     Past Surgical History:   Procedure Laterality Date     SECTION, CLASSIC  2017     Section    OTHER SURGICAL HISTORY  10/01/2019    Hysterectomy    OTHER SURGICAL HISTORY  10/01/2019    Tubal ligation     Family History   Problem Relation Name Age of Onset    Hypertension Mother      Other (cardiac disorder) Father      COPD Father      Coronary artery disease Sister       Social History     Tobacco Use    Smoking status: Never    Smokeless tobacco: Never   Vaping Use    Vaping Use: Never used   Substance Use Topics    Alcohol use: Not Currently    Drug use: Not Currently       Physical Exam   ED Triage Vitals [23 1137]   Temp Heart Rate Resp BP   36.2 °C (97.2 °F) 87 18 163/77      SpO2 Temp Source Heart Rate Source Patient Position   97 % Temporal Monitor Sitting      BP Location FiO2 (%)     Right arm --       Physical Exam    ED Course & MDM   Diagnoses as of 23   TIA (transient ischemic attack)       Medical Decision Making  =================Attending note===============    The patient was seen by the resident/fellow.  I have personally performed a substantive portion of the encounter.  I have seen and examined the patient; agree with the workup, evaluation, MDM,   management and diagnosis.  The  care plan has been discussed with the resident; I have reviewed the resident's note and agree with the documented findings.        68 female to ER with chief complaint of increased urinary frequency for a few weeks.  She does have a history of chronic UTIs for over 10 years.  Is been off antibiotics for about 3 months now.  Yesterday she noted some elevated blood pressure and she had some confusion and chills.  She also had some urinary urgency which she gets with urinary tract infections.  She also had some incontinence which she gets with urinary tract infections.  She also had some back discomfort which she typically gets with her infections.  She did get new symptoms yesterday of having difficulty reading and difficulty expressing her words.  She also had some dizziness.  She stated was vertigo, but then after further discussion it sounds more consistent with lightheadedness.  She did have some headache and she had some chills for about an hour.  She is also been having some nausea and fatigue.  She does have a history of hypertension.  No diabetes or hyperlipidemia.  She does not smoke.  She never had a stroke or TIA.    Heart is regular.  Lungs are clear.  Abdomen is soft and nontender.  No distress.  No flank tenderness.    Urinalysis has trace leukocyte esterase.  No other findings consistent with UTI.  CBC and chemistry unremarkable.    Normal sinus rhythm with a ventricular rate of 64.  .  QTc 416.  No ST changes.    Chemistry is unremarkable.  CBC is unremarkable.  CT of the head had no acute findings.    We did discuss the case with neurology and they did recommend admitting the patient for further evaluation.  She is agreeable with the plan.        Procedure  Procedures

## 2023-11-08 NOTE — H&P
History Of Present Illness  Sherrie Chan is a 68 y.o. female presenting with strokelike symptoms and urinary symptoms.  Patient reports that she has been having some increasing urinary frequency, suprapubic pain and some mild right flank pain.  She states this is similar to when she has had urinary tract infections in the past.  She reports that she has been frequently and has seen urology with them and they were trying to avoid antibiotics but she feels like she is gotten worse and worse.  She reports some associated chills as well as what she describes as brain fog.  She states this is also not uncommon with her urinary tract infections.  She states while at physical therapy yesterday she was also having significant trouble with word finding having difficulty reading the signs.  She states she could see the words but they were not making sense and she describes it as if she were reading a foreign language.  She states that she was able to make it through her appointment and had deep tissue massage.  She reports she was doing a little better after that she went home and had a severe headache and went to bed.  She reports she woke up a couple hours later and felt a lot better.  She states that she was feeling a little bit off today as well to and with the higher blood pressure she decided to come in for further evaluation.    In the ED, vital signs were significant for a heart rate of 96 and a blood pressure of 185/81.  This did improve.  Labs are significant for a calcium of 10.5 and a total protein of 8.3.  UA was positive for moderate blood and trace leukocyte Estrace.  CT head showed no acute findings.  Neurology was consulted and recommended admission for TIA work-up.    FULL CODE       Past Medical History  She has a past medical history of COVID-19 (01/18/2021), Inflammatory polyarthropathy (CMS/HCC) (01/11/2021), Personal history of other diseases of the respiratory system, and Personal history of other  mental and behavioral disorders.    Surgical History  She has a past surgical history that includes  section, classic (2017); Other surgical history (10/01/2019); and Other surgical history (10/01/2019).     Social History  She reports that she has never smoked. She has never used smokeless tobacco. She reports that she does not currently use alcohol. She reports that she does not currently use drugs.    Family History  Family History   Problem Relation Name Age of Onset    Hypertension Mother      Other (cardiac disorder) Father      COPD Father      Coronary artery disease Sister          Allergies  Nitrofurantoin monohyd/m-cryst, Statins-hmg-coa reductase inhibitors, and Sulfamethoxazole    Review of Systems   As per HPI    Physical Exam   Constitutional: Well developed, no distress, alert and cooperative  Skin: Warm and dry  Eyes: EOMI, clear sclera  ENMT: mucous membranes moist  Respiratory: Patent airways, CTAB  Cardiovascular: Regular, rate and rhythm  Abdominal: Nondistended, soft, non-tender, +BS  MSK: ROM intact  Neuro: alert and oriented x3, CNII-XII intact, no focal deficits, speech fluent and clear    Last Recorded Vitals  /66   Pulse 76   Temp 36.3 °C (97.3 °F) (Temporal)   Resp 16   Wt 77.1 kg (170 lb)   SpO2 98%     Relevant Results    CT head wo IV contrast    Result Date: 2023  Interpreted By:  Seble Cervantes, STUDY: CT HEAD WO IV CONTRAST;  2023 2:18 pm   INDICATION: Signs/Symptoms:dizzy.   COMPARISON: None.   ACCESSION NUMBER(S): [id number]   ORDERING CLINICIAN: GANESH SUBRAMANIAN   TECHNIQUE: Unenhanced CT images of the head were obtained.   FINDINGS: The ventricles, cisterns and sulci are prominent, consistent with diffuse volume loss. There are areas of nonspecific white matter hypodensity, which are probably age related or microvascular in nature.   There is no acute intracranial hemorrhage, mass effect or midline shift. No extraaxial fluid collection.   No focal  calvarial lesion.   Visualized paranasal sinuses are clear.       No acute intracranial hemorrhage or mass-effect.   MACRO: None.   Signed by: Seble Cervantes 11/8/2023 2:35 PM Dictation workstation:   [id number]     Results for orders placed or performed during the hospital encounter of 11/08/23 (from the past 24 hour(s))   Urinalysis with Reflex Microscopic   Result Value Ref Range    Color, Urine Yellow Straw, Yellow    Appearance, Urine Clear Clear    Specific Gravity, Urine 1.010 1.005 - 1.035    pH, Urine 5.0 5.0, 5.5, 6.0, 6.5, 7.0, 7.5, 8.0    Protein, Urine NEGATIVE NEGATIVE mg/dL    Glucose, Urine NEGATIVE NEGATIVE mg/dL    Blood, Urine MODERATE (2+) (A) NEGATIVE    Ketones, Urine NEGATIVE NEGATIVE mg/dL    Bilirubin, Urine NEGATIVE NEGATIVE    Urobilinogen, Urine <2.0 <2.0 mg/dL    Nitrite, Urine NEGATIVE NEGATIVE    Leukocyte Esterase, Urine TRACE (A) NEGATIVE   Microscopic Only, Urine   Result Value Ref Range    WBC, Urine 1-5 1-5, NONE /HPF    RBC, Urine 1-2 NONE, 1-2, 3-5 /HPF   CBC with Differential   Result Value Ref Range    WBC 7.8 4.4 - 11.3 x10*3/uL    nRBC 0.0 0.0 - 0.0 /100 WBCs    RBC 5.17 4.00 - 5.20 x10*6/uL    Hemoglobin 14.3 12.0 - 16.0 g/dL    Hematocrit 44.6 36.0 - 46.0 %    MCV 86 80 - 100 fL    MCH 27.7 26.0 - 34.0 pg    MCHC 32.1 32.0 - 36.0 g/dL    RDW 14.5 11.5 - 14.5 %    Platelets 281 150 - 450 x10*3/uL    Neutrophils % 67.4 40.0 - 80.0 %    Immature Granulocytes %, Automated 0.4 0.0 - 0.9 %    Lymphocytes % 22.6 13.0 - 44.0 %    Monocytes % 7.9 2.0 - 10.0 %    Eosinophils % 1.1 0.0 - 6.0 %    Basophils % 0.6 0.0 - 2.0 %    Neutrophils Absolute 5.28 1.20 - 7.70 x10*3/uL    Immature Granulocytes Absolute, Automated 0.03 0.00 - 0.70 x10*3/uL    Lymphocytes Absolute 1.77 1.20 - 4.80 x10*3/uL    Monocytes Absolute 0.62 0.10 - 1.00 x10*3/uL    Eosinophils Absolute 0.09 0.00 - 0.70 x10*3/uL    Basophils Absolute 0.05 0.00 - 0.10 x10*3/uL   Comprehensive Metabolic Panel   Result Value  Ref Range    Glucose 93 74 - 99 mg/dL    Sodium 139 136 - 145 mmol/L    Potassium 3.5 3.5 - 5.3 mmol/L    Chloride 98 98 - 107 mmol/L    Bicarbonate 31 21 - 32 mmol/L    Anion Gap 14 10 - 20 mmol/L    Urea Nitrogen 20 6 - 23 mg/dL    Creatinine 0.88 0.50 - 1.05 mg/dL    eGFR 71 >60 mL/min/1.73m*2    Calcium 10.5 (H) 8.6 - 10.3 mg/dL    Albumin 4.8 3.4 - 5.0 g/dL    Alkaline Phosphatase 48 33 - 136 U/L    Total Protein 8.3 (H) 6.4 - 8.2 g/dL    AST 17 9 - 39 U/L    Bilirubin, Total 0.3 0.0 - 1.2 mg/dL    ALT 16 7 - 45 U/L         Assessment/Plan   Principal Problem:    TIA (transient ischemic attack)  UTI   Hx recurrent UTIs  HTN  HLD    -CT H was neg  -f/u MRI/MRA  -f/u echo   -tele monitoring   -continue aspirin 81mg dialy   -unable to tolerate statins on repatha at home   -f/u lipid panel and hgba1c  -neuro consulted   -permissive HTN  -UA positive for blood and LE  -f/u Ucx   -rocephin x3 doses   -PT/OT    Dispo; This is a 67 y/o F who was admitted for suspected UTI and stroke r/o. Neuro consulted. Will likely stay <2 midnights.     Ana Galvez DO

## 2023-11-08 NOTE — TELEPHONE ENCOUNTER
Patient calling states she has UTI and also states BP was elevated yesterday to the point she was light headed and felt like she was going to pass out.  She laid down and states started feeling better now she is up and moving and symptoms are back.  Dizzy light headed, elevated BP.  I advised ED for evaluation and then call to set up follow up.      I spoke with Dr Ascencio and she agreed to go to ED for evaluation.  Patient notified and aware and will go.

## 2023-11-09 ENCOUNTER — APPOINTMENT (OUTPATIENT)
Dept: CARDIOLOGY | Facility: HOSPITAL | Age: 68
End: 2023-11-09
Payer: MEDICARE

## 2023-11-09 LAB
ANION GAP SERPL CALC-SCNC: 11 MMOL/L (ref 10–20)
ANION GAP SERPL CALC-SCNC: 14 MMOL/L (ref 10–20)
AORTIC VALVE PEAK VELOCITY: 1.6
ATRIAL RATE: 64 BPM
AV PEAK GRADIENT: 10.2
AVA (PEAK VEL): 2.16
BUN SERPL-MCNC: 19 MG/DL (ref 6–23)
BUN SERPL-MCNC: 20 MG/DL (ref 6–23)
CALCIUM SERPL-MCNC: 10 MG/DL (ref 8.6–10.3)
CALCIUM SERPL-MCNC: 10.4 MG/DL (ref 8.6–10.3)
CHLORIDE SERPL-SCNC: 100 MMOL/L (ref 98–107)
CHLORIDE SERPL-SCNC: 100 MMOL/L (ref 98–107)
CO2 SERPL-SCNC: 28 MMOL/L (ref 21–32)
CO2 SERPL-SCNC: 31 MMOL/L (ref 21–32)
CREAT SERPL-MCNC: 0.85 MG/DL (ref 0.5–1.05)
CREAT SERPL-MCNC: 0.87 MG/DL (ref 0.5–1.05)
EJECTION FRACTION APICAL 4 CHAMBER: 62.7
EJECTION FRACTION: 62
ERYTHROCYTE [DISTWIDTH] IN BLOOD BY AUTOMATED COUNT: 14.6 % (ref 11.5–14.5)
EST. AVERAGE GLUCOSE BLD GHB EST-MCNC: 114 MG/DL
GFR SERPL CREATININE-BSD FRML MDRD: 72 ML/MIN/1.73M*2
GFR SERPL CREATININE-BSD FRML MDRD: 74 ML/MIN/1.73M*2
GLUCOSE SERPL-MCNC: 102 MG/DL (ref 74–99)
GLUCOSE SERPL-MCNC: 131 MG/DL (ref 74–99)
HBA1C MFR BLD: 5.6 %
HCT VFR BLD AUTO: 38.9 % (ref 36–46)
HGB BLD-MCNC: 12.6 G/DL (ref 12–16)
LEFT VENTRICLE INTERNAL DIMENSION DIASTOLE: 3.68 (ref 3.5–6)
LEFT VENTRICULAR OUTFLOW TRACT DIAMETER: 2
MAGNESIUM SERPL-MCNC: 1.64 MG/DL (ref 1.6–2.4)
MCH RBC QN AUTO: 27.4 PG (ref 26–34)
MCHC RBC AUTO-ENTMCNC: 32.4 G/DL (ref 32–36)
MCV RBC AUTO: 85 FL (ref 80–100)
MITRAL VALVE E/A RATIO: 0.62
MITRAL VALVE E/E' RATIO: 6.62
NRBC BLD-RTO: 0 /100 WBCS (ref 0–0)
P AXIS: 29 DEGREES
P OFFSET: 203 MS
P ONSET: 150 MS
PHOSPHATE SERPL-MCNC: 1.7 MG/DL (ref 2.5–4.9)
PLATELET # BLD AUTO: 242 X10*3/UL (ref 150–450)
POTASSIUM SERPL-SCNC: 3.4 MMOL/L (ref 3.5–5.3)
POTASSIUM SERPL-SCNC: 3.9 MMOL/L (ref 3.5–5.3)
PR INTERVAL: 144 MS
Q ONSET: 222 MS
QRS COUNT: 11 BEATS
QRS DURATION: 76 MS
QT INTERVAL: 404 MS
QTC CALCULATION(BAZETT): 416 MS
QTC FREDERICIA: 412 MS
R AXIS: 14 DEGREES
RBC # BLD AUTO: 4.6 X10*6/UL (ref 4–5.2)
RIGHT VENTRICLE PEAK SYSTOLIC PRESSURE: 22.9
SODIUM SERPL-SCNC: 138 MMOL/L (ref 136–145)
SODIUM SERPL-SCNC: 139 MMOL/L (ref 136–145)
T AXIS: 41 DEGREES
T OFFSET: 424 MS
TRICUSPID ANNULAR PLANE SYSTOLIC EXCURSION: 1.9
VENTRICULAR RATE: 64 BPM
WBC # BLD AUTO: 4.6 X10*3/UL (ref 4.4–11.3)

## 2023-11-09 PROCEDURE — 96372 THER/PROPH/DIAG INJ SC/IM: CPT | Performed by: STUDENT IN AN ORGANIZED HEALTH CARE EDUCATION/TRAINING PROGRAM

## 2023-11-09 PROCEDURE — 84100 ASSAY OF PHOSPHORUS: CPT | Performed by: STUDENT IN AN ORGANIZED HEALTH CARE EDUCATION/TRAINING PROGRAM

## 2023-11-09 PROCEDURE — 94760 N-INVAS EAR/PLS OXIMETRY 1: CPT

## 2023-11-09 PROCEDURE — 2500000004 HC RX 250 GENERAL PHARMACY W/ HCPCS (ALT 636 FOR OP/ED): Performed by: STUDENT IN AN ORGANIZED HEALTH CARE EDUCATION/TRAINING PROGRAM

## 2023-11-09 PROCEDURE — 36415 COLL VENOUS BLD VENIPUNCTURE: CPT | Performed by: STUDENT IN AN ORGANIZED HEALTH CARE EDUCATION/TRAINING PROGRAM

## 2023-11-09 PROCEDURE — 80048 BASIC METABOLIC PNL TOTAL CA: CPT | Performed by: STUDENT IN AN ORGANIZED HEALTH CARE EDUCATION/TRAINING PROGRAM

## 2023-11-09 PROCEDURE — 85027 COMPLETE CBC AUTOMATED: CPT | Performed by: STUDENT IN AN ORGANIZED HEALTH CARE EDUCATION/TRAINING PROGRAM

## 2023-11-09 PROCEDURE — 93306 TTE W/DOPPLER COMPLETE: CPT

## 2023-11-09 PROCEDURE — 99232 SBSQ HOSP IP/OBS MODERATE 35: CPT | Performed by: STUDENT IN AN ORGANIZED HEALTH CARE EDUCATION/TRAINING PROGRAM

## 2023-11-09 PROCEDURE — 2500000001 HC RX 250 WO HCPCS SELF ADMINISTERED DRUGS (ALT 637 FOR MEDICARE OP): Performed by: STUDENT IN AN ORGANIZED HEALTH CARE EDUCATION/TRAINING PROGRAM

## 2023-11-09 PROCEDURE — G0378 HOSPITAL OBSERVATION PER HR: HCPCS

## 2023-11-09 PROCEDURE — 2500000005 HC RX 250 GENERAL PHARMACY W/O HCPCS: Performed by: STUDENT IN AN ORGANIZED HEALTH CARE EDUCATION/TRAINING PROGRAM

## 2023-11-09 PROCEDURE — 96375 TX/PRO/DX INJ NEW DRUG ADDON: CPT

## 2023-11-09 PROCEDURE — 83735 ASSAY OF MAGNESIUM: CPT | Performed by: STUDENT IN AN ORGANIZED HEALTH CARE EDUCATION/TRAINING PROGRAM

## 2023-11-09 RX ORDER — LANOLIN ALCOHOL/MO/W.PET/CERES
400 CREAM (GRAM) TOPICAL DAILY
Status: DISCONTINUED | OUTPATIENT
Start: 2023-11-09 | End: 2023-11-11 | Stop reason: HOSPADM

## 2023-11-09 RX ORDER — POTASSIUM CHLORIDE 20 MEQ/1
40 TABLET, EXTENDED RELEASE ORAL ONCE
Status: COMPLETED | OUTPATIENT
Start: 2023-11-09 | End: 2023-11-09

## 2023-11-09 RX ORDER — POTASSIUM PHOSPHATE, MONOBASIC AND POTASSIUM PHOSPHATE, DIBASIC 224; 236 MG/ML; MG/ML
21 INJECTION, SOLUTION INTRAVENOUS ONCE
Status: COMPLETED | OUTPATIENT
Start: 2023-11-09 | End: 2023-11-10

## 2023-11-09 RX ADMIN — HYDROCHLOROTHIAZIDE 25 MG: 25 TABLET ORAL at 10:13

## 2023-11-09 RX ADMIN — Medication 2000 UNITS: at 06:27

## 2023-11-09 RX ADMIN — ASPIRIN 81 MG: 81 TABLET, COATED ORAL at 10:13

## 2023-11-09 RX ADMIN — Medication 400 MG: at 18:09

## 2023-11-09 RX ADMIN — ENOXAPARIN SODIUM 40 MG: 40 INJECTION SUBCUTANEOUS at 18:09

## 2023-11-09 RX ADMIN — AMLODIPINE BESYLATE 10 MG: 10 TABLET ORAL at 10:13

## 2023-11-09 RX ADMIN — POTASSIUM PHOSPHATE, MONOBASIC POTASSIUM PHOSPHATE, DIBASIC 21 MMOL: 224; 236 INJECTION, SOLUTION, CONCENTRATE INTRAVENOUS at 20:22

## 2023-11-09 RX ADMIN — ACETAMINOPHEN 650 MG: 325 TABLET ORAL at 06:35

## 2023-11-09 RX ADMIN — DOCUSATE SODIUM 100 MG: 100 CAPSULE, LIQUID FILLED ORAL at 10:13

## 2023-11-09 RX ADMIN — CEFTRIAXONE SODIUM 1 G: 1 INJECTION, SOLUTION INTRAVENOUS at 20:35

## 2023-11-09 RX ADMIN — LISINOPRIL 10 MG: 10 TABLET ORAL at 10:12

## 2023-11-09 RX ADMIN — Medication 1 CAPSULE: at 10:12

## 2023-11-09 RX ADMIN — POTASSIUM CHLORIDE 40 MEQ: 1500 TABLET, EXTENDED RELEASE ORAL at 18:09

## 2023-11-09 RX ADMIN — DOCUSATE SODIUM 100 MG: 100 CAPSULE, LIQUID FILLED ORAL at 22:41

## 2023-11-09 ASSESSMENT — PAIN SCALES - GENERAL
PAINLEVEL_OUTOF10: 0 - NO PAIN
PAINLEVEL_OUTOF10: 0 - NO PAIN
PAINLEVEL_OUTOF10: 3

## 2023-11-09 ASSESSMENT — COGNITIVE AND FUNCTIONAL STATUS - GENERAL
MOBILITY SCORE: 24
DAILY ACTIVITIY SCORE: 24
DAILY ACTIVITIY SCORE: 24
MOBILITY SCORE: 24

## 2023-11-09 ASSESSMENT — PAIN - FUNCTIONAL ASSESSMENT
PAIN_FUNCTIONAL_ASSESSMENT: 0-10

## 2023-11-09 NOTE — PROGRESS NOTES
11/09/23 1535   Discharge Planning   Type of Residence Private residence   Number of Stairs to Enter Residence 0   Number of Stairs Within Residence 7  (Both up and down)   Do you have animals or pets at home? Yes   Type of Animals or Pets 2 dogs and 1 cat   Who is requesting discharge planning? Provider   Does the patient need discharge transport arranged? No     Met with patient and spouse at bedside. Verified address and PCP. Patient utilizes [facility] in [facility] and has no issues in obtaining her medications. Patient does still drive, uses no ambulation devices and performs her own ADL's.  Patient feels safe going home and anticipates no home needs at discharge. TCC team will follow patient till discharge.

## 2023-11-09 NOTE — PROGRESS NOTES
"Sherrie Chan is a 68 y.o. female on day 0 of admission presenting with TIA (transient ischemic attack).    Subjective   Pt seen and examined at bedside.  Patient reports that she is doing better today.  Bedside.  All questions answered.       Objective     Physical Exam  Constitutional: Well developed, no distress, alert and cooperative  Skin: Warm and dry  Eyes: EOMI, clear sclera  ENMT: mucous membranes moist  Respiratory: Patent airways, CTAB  Cardiovascular: Regular, rate and rhythm  Abdominal: Nondistended, soft, non-tender, +BS  MSK: ROM intact  Neuro: alert and oriented x3,  no focal deficits, speech fluent and clear    Last Recorded Vitals  Blood pressure 137/60, pulse 87, temperature 36.3 °C (97.3 °F), temperature source Temporal, resp. rate 18, height 1.62 m (5' 3.78\"), weight 79.1 kg (174 lb 6.1 oz), SpO2 94 %.  Intake/Output last 3 Shifts:  I/O last 3 completed shifts:  In: 50 (0.6 mL/kg) [IV Piggyback:50]  Out: 800 (10.1 mL/kg) [Urine:800 (0.3 mL/kg/hr)]  Weight: 79.1 kg     Relevant Results  Scheduled medications  amLODIPine, 10 mg, oral, Daily  aspirin, 81 mg, oral, Daily  cefTRIAXone, 1 g, intravenous, q24h  cholecalciferol, 2,000 Units, oral, Daily  docusate sodium, 100 mg, oral, BID  enoxaparin, 40 mg, subcutaneous, q24h  hydroCHLOROthiazide, 25 mg, oral, Daily  lactobacillus acidophilus, 1 capsule, oral, Daily  lisinopril, 10 mg, oral, Daily  magnesium oxide, 400 mg, oral, Daily  perflutren lipid microspheres, 0.5-10 mL of dilution, intravenous, Once in imaging  potassium chloride CR, 40 mEq, oral, Once  potassium phosphate, 21 mmol, intravenous, Once      Continuous medications     PRN medications  PRN medications: acetaminophen, oxygen  Results from last 7 days   Lab Units 11/09/23  0947 11/08/23  1315   WBC AUTO x10*3/uL 4.6 7.8   RBC AUTO x10*6/uL 4.60 5.17   HEMOGLOBIN g/dL 12.6 14.3     Results from last 7 days   Lab Units 11/09/23  0947 11/08/23  1315   SODIUM mmol/L 139 139   POTASSIUM " mmol/L 3.4* 3.5   CHLORIDE mmol/L 100 98   CO2 mmol/L 31 31   BUN mg/dL 19 20   CREATININE mg/dL 0.87 0.88   CALCIUM mg/dL 10.0 10.5*   PHOSPHORUS mg/dL 1.7*  --    MAGNESIUM mg/dL 1.64  --    BILIRUBIN TOTAL mg/dL  --  0.3   ALT U/L  --  16   AST U/L  --  17       MR brain wo IV contrast    Result Date: 11/9/2023  Interpreted By:  Jimmie Perez, STUDY: MR BRAIN WO IV CONTRAST; MR ANGIO NECK WO IV CONTRAST; MR ANGIO HEAD WO IV CONTRAST;  11/8/2023 7:22 pm   INDICATION: Signs/Symptoms:cva r/o.   COMPARISON: CT of the head dated 11/08/2023   ACCESSION NUMBER(S): [id number]; [id number]; [id number]   ORDERING CLINICIAN: NIDIA CORTES   TECHNIQUE: Axial diffusion, axial T2, axial FLAIR, axial gradient echo T2, coronal T1, and sagittal T1 weighted MRI images of the brain were obtained without intravenous contrast administration. In addition, time-of-flight MRA images of the neck and intracranial vessels were obtained.  The source MRA images were reformatted in multiple planes.   FINDINGS: The diffusion weighted images fail to demonstrate evidence of abnormal diffusion restriction to suggest acute infarction.   The ventricular system is nondilated.   There are mild nonspecific white matter changes noted within the cerebral hemispheres bilaterally which while nonspecific, given the patient's age, likely represent sequelae of more remote small-vessel ischemic change.   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 Correlation with any previous outside MRI studies if available would be helpful to confirm stability. If none are available, further evaluation with post gadolinium MRI imaging would be recommended to confirm lack of associated pathologic enhancement.   There is a retention cyst or polyp noted within the inferior left maxillary sinus. Minimal mucosal thickening is noted within a few scattered ethmoid air cells.   The mastoid air cells are clear.   The MRA of the neck  demonstrates no significant stenosis along the carotid bifurcations.  No significant stenosis is noted along the visualized cervical segments of the vertebral arteries.   The intracranial MRA demonstrates a small 2-3 mm outpouching of MRA signal projecting inferiorly off of the junction of the ophthalmic and communicating segments of the left internal carotid artery which may represent an infundibular origin of the left posterior communicating artery; however, the left posterior communicating artery is not clearly visualized on this intracranial MRA the possibility of a small aneurysm therefore can not be entirely excluded. No significant intracranial stenosis is noted.       There is no MRI evidence of acute infarction on the diffusion weighted images.   There are mild nonspecific white matter changes noted within the cerebral hemispheres bilaterally which while nonspecific, given the patient's age, likely represent sequelae of more remote small-vessel ischemic change.   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 Correlation with any previous outside MRI studies if available would be helpful to confirm stability. If none are available, further evaluation with post gadolinium MRI imaging would be recommended to confirm lack of associated pathologic enhancement.   The MRA of the neck demonstrates no significant stenosis along the carotid bifurcations. No significant stenosis is noted along the visualized cervical segments of the vertebral arteries.   The intracranial MRA demonstrates a small 2-3 mm outpouching of MRA signal projecting inferiorly off of the junction of the ophthalmic and communicating segments of the left internal carotid artery which may represent an infundibular origin of the left posterior communicating artery; however, the left posterior communicating artery is not clearly visualized on this intracranial MRA the possibility of a small aneurysm therefore can not be  entirely excluded. No significant intracranial stenosis is noted.     MACRO: Critical Finding:  See findings. Notification was initiated on 11/9/2023 at 6:50 am by  Jimmie Perez.  (**-YCF-**) Instructions:   Signed by: Jimmie Perez 11/9/2023 6:51 AM Dictation workstation:   [id number]    MR angio head wo IV contrast    Result Date: 11/9/2023  Interpreted By:  Jimmie Perez, STUDY: MR BRAIN WO IV CONTRAST; MR ANGIO NECK WO IV CONTRAST; MR ANGIO HEAD WO IV CONTRAST;  11/8/2023 7:22 pm   INDICATION: Signs/Symptoms:cva r/o.   COMPARISON: CT of the head dated 11/08/2023   ACCESSION NUMBER(S): [id number]; [id number]; [id number]   ORDERING CLINICIAN: NIDIA CORTES   TECHNIQUE: Axial diffusion, axial T2, axial FLAIR, axial gradient echo T2, coronal T1, and sagittal T1 weighted MRI images of the brain were obtained without intravenous contrast administration. In addition, time-of-flight MRA images of the neck and intracranial vessels were obtained.  The source MRA images were reformatted in multiple planes.   FINDINGS: The diffusion weighted images fail to demonstrate evidence of abnormal diffusion restriction to suggest acute infarction.   The ventricular system is nondilated.   There are mild nonspecific white matter changes noted within the cerebral hemispheres bilaterally which while nonspecific, given the patient's age, likely represent sequelae of more remote small-vessel ischemic change.   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 Correlation with any previous outside MRI studies if available would be helpful to confirm stability. If none are available, further evaluation with post gadolinium MRI imaging would be recommended to confirm lack of associated pathologic enhancement.   There is a retention cyst or polyp noted within the inferior left maxillary sinus. Minimal mucosal thickening is noted within a few scattered ethmoid air cells.   The mastoid air cells are  clear.   The MRA of the neck demonstrates no significant stenosis along the carotid bifurcations.  No significant stenosis is noted along the visualized cervical segments of the vertebral arteries.   The intracranial MRA demonstrates a small 2-3 mm outpouching of MRA signal projecting inferiorly off of the junction of the ophthalmic and communicating segments of the left internal carotid artery which may represent an infundibular origin of the left posterior communicating artery; however, the left posterior communicating artery is not clearly visualized on this intracranial MRA the possibility of a small aneurysm therefore can not be entirely excluded. No significant intracranial stenosis is noted.       There is no MRI evidence of acute infarction on the diffusion weighted images.   There are mild nonspecific white matter changes noted within the cerebral hemispheres bilaterally which while nonspecific, given the patient's age, likely represent sequelae of more remote small-vessel ischemic change.   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 Correlation with any previous outside MRI studies if available would be helpful to confirm stability. If none are available, further evaluation with post gadolinium MRI imaging would be recommended to confirm lack of associated pathologic enhancement.   The MRA of the neck demonstrates no significant stenosis along the carotid bifurcations. No significant stenosis is noted along the visualized cervical segments of the vertebral arteries.   The intracranial MRA demonstrates a small 2-3 mm outpouching of MRA signal projecting inferiorly off of the junction of the ophthalmic and communicating segments of the left internal carotid artery which may represent an infundibular origin of the left posterior communicating artery; however, the left posterior communicating artery is not clearly visualized on this intracranial MRA the possibility of a small  aneurysm therefore can not be entirely excluded. No significant intracranial stenosis is noted.     MACRO: Critical Finding:  See findings. Notification was initiated on 11/9/2023 at 6:50 am by  Jimmie Perez.  (**-YCF-**) Instructions:   Signed by: Jimmie Perez 11/9/2023 6:51 AM Dictation workstation:   [id number]    MR angio neck wo IV contrast    Result Date: 11/9/2023  Interpreted By:  Jimmie Perez, STUDY: MR BRAIN WO IV CONTRAST; MR ANGIO NECK WO IV CONTRAST; MR ANGIO HEAD WO IV CONTRAST;  11/8/2023 7:22 pm   INDICATION: Signs/Symptoms:cva r/o.   COMPARISON: CT of the head dated 11/08/2023   ACCESSION NUMBER(S): [id number]; [id number]; [id number]   ORDERING CLINICIAN: NIDIA CORTES   TECHNIQUE: Axial diffusion, axial T2, axial FLAIR, axial gradient echo T2, coronal T1, and sagittal T1 weighted MRI images of the brain were obtained without intravenous contrast administration. In addition, time-of-flight MRA images of the neck and intracranial vessels were obtained.  The source MRA images were reformatted in multiple planes.   FINDINGS: The diffusion weighted images fail to demonstrate evidence of abnormal diffusion restriction to suggest acute infarction.   The ventricular system is nondilated.   There are mild nonspecific white matter changes noted within the cerebral hemispheres bilaterally which while nonspecific, given the patient's age, likely represent sequelae of more remote small-vessel ischemic change.   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 Correlation with any previous outside MRI studies if available would be helpful to confirm stability. If none are available, further evaluation with post gadolinium MRI imaging would be recommended to confirm lack of associated pathologic enhancement.   There is a retention cyst or polyp noted within the inferior left maxillary sinus. Minimal mucosal thickening is noted within a few scattered ethmoid air cells.    The mastoid air cells are clear.   The MRA of the neck demonstrates no significant stenosis along the carotid bifurcations.  No significant stenosis is noted along the visualized cervical segments of the vertebral arteries.   The intracranial MRA demonstrates a small 2-3 mm outpouching of MRA signal projecting inferiorly off of the junction of the ophthalmic and communicating segments of the left internal carotid artery which may represent an infundibular origin of the left posterior communicating artery; however, the left posterior communicating artery is not clearly visualized on this intracranial MRA the possibility of a small aneurysm therefore can not be entirely excluded. No significant intracranial stenosis is noted.       There is no MRI evidence of acute infarction on the diffusion weighted images.   There are mild nonspecific white matter changes noted within the cerebral hemispheres bilaterally which while nonspecific, given the patient's age, likely represent sequelae of more remote small-vessel ischemic change.   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 Correlation with any previous outside MRI studies if available would be helpful to confirm stability. If none are available, further evaluation with post gadolinium MRI imaging would be recommended to confirm lack of associated pathologic enhancement.   The MRA of the neck demonstrates no significant stenosis along the carotid bifurcations. No significant stenosis is noted along the visualized cervical segments of the vertebral arteries.   The intracranial MRA demonstrates a small 2-3 mm outpouching of MRA signal projecting inferiorly off of the junction of the ophthalmic and communicating segments of the left internal carotid artery which may represent an infundibular origin of the left posterior communicating artery; however, the left posterior communicating artery is not clearly visualized on this intracranial MRA  the possibility of a small aneurysm therefore can not be entirely excluded. No significant intracranial stenosis is noted.     MACRO: Critical Finding:  See findings. Notification was initiated on 11/9/2023 at 6:50 am by  Jimmie Perez.  (**-YCF-**) Instructions:   Signed by: Jimmie Perez 11/9/2023 6:51 AM Dictation workstation:   [id number]    CT head wo IV contrast    Result Date: 11/8/2023  Interpreted By:  Seble Cervantes, STUDY: CT HEAD WO IV CONTRAST;  11/8/2023 2:18 pm   INDICATION: Signs/Symptoms:dizzy.   COMPARISON: None.   ACCESSION NUMBER(S): [id number]   ORDERING CLINICIAN: GANESH SUBRAMANIAN   TECHNIQUE: Unenhanced CT images of the head were obtained.   FINDINGS: The ventricles, cisterns and sulci are prominent, consistent with diffuse volume loss. There are areas of nonspecific white matter hypodensity, which are probably age related or microvascular in nature.   There is no acute intracranial hemorrhage, mass effect or midline shift. No extraaxial fluid collection.   No focal calvarial lesion.   Visualized paranasal sinuses are clear.       No acute intracranial hemorrhage or mass-effect.   MACRO: None.   Signed by: Seble Cervantes 11/8/2023 2:35 PM Dictation workstation:   [id number]       Assessment/Plan   Principal Problem:    TIA (transient ischemic attack)  UTI   Hx recurrent UTIs  HTN  HLD     -CT H was neg  -MRI/MRA - MRI evidence of acute infarction.  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 Small 2-3 mm outpouching of MRA signal projecting inferiorly off of the junction of the ophthalmic and communicating segments of the left internal carotid artery which may represent an infundibular origin of the left posterior communicating artery; however, the left posterior communicating artery is not clearly visualized on this intracranial MRA the possibility of a small aneurysm therefore can not be entirely excluded.   -f/u echo   -tele monitoring   -continue aspirin 81mg dialy    -unable to tolerate statins on repatha at home   -lipid panel ,   -hgba1c 5.6  -neuro consulted   -BP controlled   -UA positive for blood and LE  -f/u Ucx   -rocephin x3 doses   -recommend outpatient urology follow up   -PT/OT     Dispo; This is a 67 y/o F who was admitted for suspected UTI and stroke r/o. Neuro consulted. Pt with urinary symptoms, blood and LE on UA. F/u Ucx. MRI neg for acute stroke. Echo pending.  Anticipate discharge in 24 hrs.       Ana Galvez,

## 2023-11-09 NOTE — CARE PLAN
The patient's goals for the shift include   Problem: Safety - Adult  Goal: Free from fall injury  11/9/2023 0414 by Flori Gill RN  Outcome: Progressing  11/9/2023 0413 by Flori Gill RN  Outcome: Progressing     Problem: Discharge Planning  Goal: Discharge to home or other facility with appropriate resources  Outcome: Progressing     Problem: Chronic Conditions and Co-morbidities  Goal: Patient's chronic conditions and co-morbidity symptoms are monitored and maintained or improved  11/9/2023 0414 by Flori Gill RN  Outcome: Progressing  11/9/2023 0413 by Flori Gill RN  Outcome: Progressing     Problem: Fall/Injury  Goal: Not fall by end of shift  11/9/2023 0414 by Flori Gill RN  Outcome: Progressing  11/9/2023 0413 by Flori Gill RN  Outcome: Progressing  Goal: Be free from injury by end of the shift  11/9/2023 0414 by Flori Gill RN  Outcome: Progressing  11/9/2023 0413 by Flori Gill RN  Outcome: Progressing  Goal: Verbalize understanding of personal risk factors for fall in the hospital  Outcome: Progressing  Goal: Verbalize understanding of risk factor reduction measures to prevent injury from fall in the home  Outcome: Progressing  Goal: Use assistive devices by end of the shift  Outcome: Progressing  Goal: Pace activities to prevent fatigue by end of the shift  Outcome: Progressing     Problem: General Stroke  Goal: Demonstrate improvement in neurological exam throughout the shift  Outcome: Progressing  Goal: Maintain BP within ordered limits throughout shift  Outcome: Progressing  Goal: Participate in treatment (ie., meds, therapy) throughout shift  Outcome: Progressing  Goal: No symptoms of aspiration throughout shift  Outcome: Progressing      The clinical goals for the shift include labs, vital signs, and neuro checks monitored Q4.    Over the shift, the patient did make progress toward the following goals. Patient's neuro checks were within normal limits and VSS.

## 2023-11-09 NOTE — PROGRESS NOTES
Physical Therapy                 Therapy Communication Note - Functional Screen    Patient Name: Sherrie Chan  MRN: [id number]  Today's Date: 11/9/2023     Discipline: Physical Therapy    Missed Time: Attempt    Comment: PT orders received, chart reviewed.  Spoke with pt and observed her independently ambulating around room without LOB.  Pt denies any concerns with returning home upon DC from the hospital.  Will DC orders at this time.

## 2023-11-09 NOTE — PROGRESS NOTES
Occupational Therapy                 Therapy Communication Note    Patient Name: Sherrie Chan  MRN: [id number]  Today's Date: 11/9/2023     Discipline: Occupational Therapy    Comment: Received orders for OT eval 11/8. Completed chart review and functional screen this date. Spoke with PT, and pt has been up independent with ADL's and mobility. Pt does not have any OT needs at this time, and OT services will be discharged.

## 2023-11-10 LAB
BACTERIA UR CULT: NO GROWTH
MAGNESIUM SERPL-MCNC: 1.69 MG/DL (ref 1.6–2.4)
PHOSPHATE SERPL-MCNC: 2.9 MG/DL (ref 2.5–4.9)

## 2023-11-10 PROCEDURE — 36415 COLL VENOUS BLD VENIPUNCTURE: CPT | Performed by: STUDENT IN AN ORGANIZED HEALTH CARE EDUCATION/TRAINING PROGRAM

## 2023-11-10 PROCEDURE — 83735 ASSAY OF MAGNESIUM: CPT | Performed by: STUDENT IN AN ORGANIZED HEALTH CARE EDUCATION/TRAINING PROGRAM

## 2023-11-10 PROCEDURE — 2500000001 HC RX 250 WO HCPCS SELF ADMINISTERED DRUGS (ALT 637 FOR MEDICARE OP): Performed by: STUDENT IN AN ORGANIZED HEALTH CARE EDUCATION/TRAINING PROGRAM

## 2023-11-10 PROCEDURE — G0378 HOSPITAL OBSERVATION PER HR: HCPCS

## 2023-11-10 PROCEDURE — 2500000004 HC RX 250 GENERAL PHARMACY W/ HCPCS (ALT 636 FOR OP/ED): Performed by: STUDENT IN AN ORGANIZED HEALTH CARE EDUCATION/TRAINING PROGRAM

## 2023-11-10 PROCEDURE — 99232 SBSQ HOSP IP/OBS MODERATE 35: CPT | Performed by: STUDENT IN AN ORGANIZED HEALTH CARE EDUCATION/TRAINING PROGRAM

## 2023-11-10 PROCEDURE — 99223 1ST HOSP IP/OBS HIGH 75: CPT | Performed by: SPECIALIST

## 2023-11-10 PROCEDURE — 84100 ASSAY OF PHOSPHORUS: CPT | Performed by: STUDENT IN AN ORGANIZED HEALTH CARE EDUCATION/TRAINING PROGRAM

## 2023-11-10 RX ADMIN — Medication 1 CAPSULE: at 08:48

## 2023-11-10 RX ADMIN — CEFTRIAXONE SODIUM 1 G: 1 INJECTION, SOLUTION INTRAVENOUS at 19:00

## 2023-11-10 RX ADMIN — HYDROCHLOROTHIAZIDE 25 MG: 25 TABLET ORAL at 08:48

## 2023-11-10 RX ADMIN — Medication 400 MG: at 08:48

## 2023-11-10 RX ADMIN — LISINOPRIL 10 MG: 10 TABLET ORAL at 08:48

## 2023-11-10 RX ADMIN — DOCUSATE SODIUM 100 MG: 100 CAPSULE, LIQUID FILLED ORAL at 08:48

## 2023-11-10 RX ADMIN — DOCUSATE SODIUM 100 MG: 100 CAPSULE, LIQUID FILLED ORAL at 21:48

## 2023-11-10 RX ADMIN — Medication 2000 UNITS: at 05:50

## 2023-11-10 RX ADMIN — ASPIRIN 81 MG: 81 TABLET, COATED ORAL at 08:48

## 2023-11-10 RX ADMIN — AMLODIPINE BESYLATE 10 MG: 10 TABLET ORAL at 08:48

## 2023-11-10 ASSESSMENT — PAIN - FUNCTIONAL ASSESSMENT
PAIN_FUNCTIONAL_ASSESSMENT: 0-10

## 2023-11-10 ASSESSMENT — COGNITIVE AND FUNCTIONAL STATUS - GENERAL
MOBILITY SCORE: 24
DAILY ACTIVITIY SCORE: 24

## 2023-11-10 ASSESSMENT — PAIN SCALES - GENERAL
PAINLEVEL_OUTOF10: 0 - NO PAIN

## 2023-11-10 NOTE — CARE PLAN
Problem: General Stroke  Goal: Demonstrate improvement in neurological exam throughout the shift  Outcome: Met  Goal: Maintain BP within ordered limits throughout shift  Outcome: Met  Goal: Participate in treatment (ie., meds, therapy) throughout shift  Outcome: Met  Goal: No symptoms of aspiration throughout shift  Outcome: Met

## 2023-11-10 NOTE — PROGRESS NOTES
"Sherrie Chan is a 68 y.o. female on day 0 of admission presenting with TIA (transient ischemic attack).    Subjective   Pt is feeling the same. Denies new complaints. She is asking when she can go home.        Objective     Physical Exam  Vitals and nursing note reviewed.   Constitutional:       General: She is not in acute distress.     Appearance: Normal appearance. She is not ill-appearing or toxic-appearing.   HENT:      Head: Normocephalic and atraumatic.      Nose: Nose normal.      Mouth/Throat:      Mouth: Mucous membranes are moist.   Eyes:      Extraocular Movements: Extraocular movements intact.      Conjunctiva/sclera: Conjunctivae normal.      Pupils: Pupils are equal, round, and reactive to light.   Cardiovascular:      Rate and Rhythm: Normal rate.      Heart sounds: No murmur heard.     No gallop.   Pulmonary:      Effort: Pulmonary effort is normal. No respiratory distress.      Breath sounds: Normal breath sounds. No wheezing, rhonchi or rales.   Abdominal:      General: Abdomen is flat. Bowel sounds are normal. There is no distension.      Palpations: Abdomen is soft. There is no mass.      Tenderness: There is no abdominal tenderness.   Musculoskeletal:         General: No swelling or tenderness. Normal range of motion.      Cervical back: Normal range of motion and neck supple.   Skin:     General: Skin is warm and dry.   Neurological:      General: No focal deficit present.      Mental Status: She is alert and oriented to person, place, and time. Mental status is at baseline.   Psychiatric:         Mood and Affect: Mood normal.         Behavior: Behavior normal.         Thought Content: Thought content normal.         Judgment: Judgment normal.         Last Recorded Vitals:  /60 (BP Location: Left arm, Patient Position: Sitting)   Pulse 72   Temp 35.9 °C (96.6 °F) (Temporal)   Resp 16   Ht 1.62 m (5' 3.78\")   Wt 79.1 kg (174 lb 6.1 oz)   SpO2 98%   BMI 30.14 kg/m²  "     Scheduled medications:  amLODIPine, 10 mg, oral, Daily  aspirin, 81 mg, oral, Daily  cefTRIAXone, 1 g, intravenous, q24h  cholecalciferol, 2,000 Units, oral, Daily  docusate sodium, 100 mg, oral, BID  enoxaparin, 40 mg, subcutaneous, q24h  hydroCHLOROthiazide, 25 mg, oral, Daily  lactobacillus acidophilus, 1 capsule, oral, Daily  lisinopril, 10 mg, oral, Daily  magnesium oxide, 400 mg, oral, Daily  perflutren lipid microspheres, 0.5-10 mL of dilution, intravenous, Once in imaging      Continuous medications:     PRN medications:  PRN medications: acetaminophen, oxygen     Relevant Results:  Results for orders placed or performed during the hospital encounter of 11/08/23 (from the past 24 hour(s))   Basic Metabolic Panel   Result Value Ref Range    Glucose 102 (H) 74 - 99 mg/dL    Sodium 138 136 - 145 mmol/L    Potassium 3.9 3.5 - 5.3 mmol/L    Chloride 100 98 - 107 mmol/L    Bicarbonate 28 21 - 32 mmol/L    Anion Gap 14 10 - 20 mmol/L    Urea Nitrogen 20 6 - 23 mg/dL    Creatinine 0.85 0.50 - 1.05 mg/dL    eGFR 74 >60 mL/min/1.73m*2    Calcium 10.4 (H) 8.6 - 10.3 mg/dL   Magnesium   Result Value Ref Range    Magnesium 1.69 1.60 - 2.40 mg/dL   Phosphorus   Result Value Ref Range    Phosphorus 2.9 2.5 - 4.9 mg/dL       Transthoracic Echo (TTE) Complete    Result Date: 11/9/2023            [facility] [address] [address]    Tel [phone] Fax [phone] TRANSTHORACIC ECHOCARDIOGRAM REPORT  Patient Name:      GEORGE Watson Physician:    [id number] Gomez Kay MD Study Date:        11/9/2023            Ordering Provider:    [id number] NIDIA CORTES MRN/PID:           [id number]             Fellow: Accession#:        [id number]         Nurse:                Citlaly Escobedo RN Date of Birth/Age: 1955 / 68 years Sonographer:          Goldie PRESCOTT  Gender:            F                    Additional Staff: Height:            160.02 cm            Admit Date: Weight:            78.93 kg             Admission Status:     Observation -                                                               Priority discharge BSA:               1.82 m2              Department Location:  [facility] Lab Blood Pressure: 133 /62 mmHg Study Type:    TRANSTHORACIC ECHO (TTE) COMPLETE Diagnosis/ICD: Transient cerebral ischemic attack, unspecified (NOT on                LCD)-G45.9 Indication:    Transischemic Attack Patient History: Pertinent History: HTN, Hyperlipidemia and TIA. Study Detail: The following Echo studies were performed: 2D, M-Mode, Doppler and               color flow. Agitated saline used as a contrast agent for               intraseptal flow evaluation.  PHYSICIAN INTERPRETATION: Left Ventricle: Left ventricular systolic function is normal. There are no regional wall motion abnormalities. The left ventricular cavity size is normal. The left ventricular septal wall thickness is normal. There is normal left ventricular posterior wall thickness. Spectral Doppler shows a normal pattern of left ventricular diastolic filling. There are normal left ventricular filling pressures. LV Wall Scoring: All segments are normal. Left Atrium: The left atrium is normal in size. Right Ventricle: The right ventricle is normal in size. There is normal right ventricular global systolic function. Right Atrium: The right atrium is normal in size. Aortic Valve: The aortic valve is trileaflet. There is no aortic valve thickening. There is no evidence of aortic valve stenosis. There is no evidence of aortic valve regurgitation. The peak instantaneous gradient of the aortic valve is 10.2 mmHg. Mitral Valve: The mitral valve is normal in structure. There is trace mitral valve regurgitation. Tricuspid Valve: The tricuspid valve is structurally normal. There is trace tricuspid regurgitation. The  Doppler estimated RVSP is within normal limits at 22.9 mmHg. Pulmonic Valve: The pulmonic valve is structurally normal. There is no indication of pulmonic valve regurgitation. Pericardium: There is no pericardial effusion noted. Aorta: The aortic root is normal. There is no dilatation of the aortic arch. There is no dilatation of the ascending aorta. There is no dilatation of the aortic root. Systemic Veins: The inferior vena cava appears to be of normal size. The hepatic vein appears to be of normal size. There is IVC inspiratory collapse greater than 50%. The hepatic vein shows a normal flow pattern. Additional Comments: Good quality agitated saline study no PFO or shunt clip# 89.  CONCLUSIONS:  1. Left ventricular systolic function is normal.  2. RVSP within normal limits.  3. Aortic valve stenosis is not present.  4. Good quality agitated saline study no PFO or shunt clip# 89. QUANTITATIVE DATA SUMMARY: 2D MEASUREMENTS:                          Normal Ranges: LAs:           3.30 cm   (2.7-4.0cm) IVSd:          0.94 cm   (0.6-1.1cm) LVPWd:         0.95 cm   (0.6-1.1cm) LVIDd:         3.68 cm   (3.9-5.9cm) LVIDs:         2.34 cm LV Mass Index: 56.5 g/m2 LV % FS        36.4 % LA VOLUME:                       Normal Ranges: LA Area A4C: 10.1 cm2 LA Area A2C: 10.1 cm2 LA Vol A4C:  20.0 ml LA Vol A2C:  21.0 ml LA Vol BP:   20.0 ml M-MODE MEASUREMENTS:                  Normal Ranges: Ao Root: 2.40 cm (2.0-3.7cm) AoV Exc: 1.50 cm (1.5-2.5cm) AORTA MEASUREMENTS:                    Normal Ranges: AoV Exc:   1.50 cm (1.5-2.5cm) Asc Ao, d: 2.80 cm (2.1-3.4cm) LV SYSTOLIC FUNCTION BY 2D PLANIMETRY (MOD):                     Normal Ranges: EF-A4C View: 62.7 % (>=55%) EF-A2C View: 61.7 % EF-Biplane:  62.1 % LV DIASTOLIC FUNCTION:                        Normal Ranges: MV Peak E:    0.56 m/s (0.7-1.2 m/s) MV Peak A:    0.91 m/s (0.42-0.7 m/s) E/A Ratio:    0.62     (1.0-2.2) MV e'         0.08 m/s (>8.0) MV lateral e' 0.10 m/s  MV medial e'  0.07 m/s E/e' Ratio:   6.62     (<8.0) MITRAL VALVE:                 Normal Ranges: MV DT: 250 msec (150-240msec) AORTIC VALVE:                          Normal Ranges: AoV Vmax:      1.60 m/s  (<=1.7m/s) AoV Peak PG:   10.2 mmHg (<20mmHg) LVOT Max Michael:  1.10 m/s  (<=1.1m/s) LVOT Diameter: 2.00 cm   (1.8-2.4cm) AoV Area,Vmax: 2.16 cm2  (2.5-4.5cm2)  RIGHT VENTRICLE: RV Basal 2.70 cm RV Mid   2.50 cm RV Major 5.8 cm TAPSE:   19.0 mm TRICUSPID VALVE/RVSP:                             Normal Ranges: Peak TR Velocity: 2.23 m/s RV Syst Pressure: 22.9 mmHg (< 30mmHg) PULMONIC VALVE:                         Normal Ranges: PV Accel Time: 137 msec (>120ms)  [id number] Gomez Kay MD Electronically signed on 11/9/2023 at 7:52:12 PM  Wall Scoring  ** Final **     MR brain wo IV contrast    Result Date: 11/9/2023  Interpreted By:  Jimmie Perez, STUDY: MR BRAIN WO IV CONTRAST; MR ANGIO NECK WO IV CONTRAST; MR ANGIO HEAD WO IV CONTRAST;  11/8/2023 7:22 pm   INDICATION: Signs/Symptoms:cva r/o.   COMPARISON: CT of the head dated 11/08/2023   ACCESSION NUMBER(S): [id number]; [id number]; [id number]   ORDERING CLINICIAN: NIDIA CORTES   TECHNIQUE: Axial diffusion, axial T2, axial FLAIR, axial gradient echo T2, coronal T1, and sagittal T1 weighted MRI images of the brain were obtained without intravenous contrast administration. In addition, time-of-flight MRA images of the neck and intracranial vessels were obtained.  The source MRA images were reformatted in multiple planes.   FINDINGS: The diffusion weighted images fail to demonstrate evidence of abnormal diffusion restriction to suggest acute infarction.   The ventricular system is nondilated.   There are mild nonspecific white matter changes noted within the cerebral hemispheres bilaterally which while nonspecific, given the patient's age, likely represent sequelae of more remote small-vessel ischemic change.   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 Correlation with any previous outside MRI studies if available would be helpful to confirm stability. If none are available, further evaluation with post gadolinium MRI imaging would be recommended to confirm lack of associated pathologic enhancement.   There is a retention cyst or polyp noted within the inferior left maxillary sinus. Minimal mucosal thickening is noted within a few scattered ethmoid air cells.   The mastoid air cells are clear.   The MRA of the neck demonstrates no significant stenosis along the carotid bifurcations.  No significant stenosis is noted along the visualized cervical segments of the vertebral arteries.   The intracranial MRA demonstrates a small 2-3 mm outpouching of MRA signal projecting inferiorly off of the junction of the ophthalmic and communicating segments of the left internal carotid artery which may represent an infundibular origin of the left posterior communicating artery; however, the left posterior communicating artery is not clearly visualized on this intracranial MRA the possibility of a small aneurysm therefore can not be entirely excluded. No significant intracranial stenosis is noted.       There is no MRI evidence of acute infarction on the diffusion weighted images.   There are mild nonspecific white matter changes noted within the cerebral hemispheres bilaterally which while nonspecific, given the patient's age, likely represent sequelae of more remote small-vessel ischemic change.   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 Correlation with any previous outside MRI studies if available would be helpful to confirm stability. If none are available, further evaluation with post gadolinium MRI imaging would be recommended to confirm lack of associated pathologic enhancement.   The MRA of the neck demonstrates no significant stenosis along the carotid bifurcations. No significant stenosis is noted along the  visualized cervical segments of the vertebral arteries.   The intracranial MRA demonstrates a small 2-3 mm outpouching of MRA signal projecting inferiorly off of the junction of the ophthalmic and communicating segments of the left internal carotid artery which may represent an infundibular origin of the left posterior communicating artery; however, the left posterior communicating artery is not clearly visualized on this intracranial MRA the possibility of a small aneurysm therefore can not be entirely excluded. No significant intracranial stenosis is noted.     MACRO: Critical Finding:  See findings. Notification was initiated on 11/9/2023 at 6:50 am by  Jimmie Perez.  (**-YCF-**) Instructions:   Signed by: Jimmie Perez 11/9/2023 6:51 AM Dictation workstation:   [id number]    MR angio head wo IV contrast    Result Date: 11/9/2023  Interpreted By:  Jimmie Perez, STUDY: MR BRAIN WO IV CONTRAST; MR ANGIO NECK WO IV CONTRAST; MR ANGIO HEAD WO IV CONTRAST;  11/8/2023 7:22 pm   INDICATION: Signs/Symptoms:cva r/o.   COMPARISON: CT of the head dated 11/08/2023   ACCESSION NUMBER(S): [id number]; [id number]; [id number]   ORDERING CLINICIAN: NIDIA CORTES   TECHNIQUE: Axial diffusion, axial T2, axial FLAIR, axial gradient echo T2, coronal T1, and sagittal T1 weighted MRI images of the brain were obtained without intravenous contrast administration. In addition, time-of-flight MRA images of the neck and intracranial vessels were obtained.  The source MRA images were reformatted in multiple planes.   FINDINGS: The diffusion weighted images fail to demonstrate evidence of abnormal diffusion restriction to suggest acute infarction.   The ventricular system is nondilated.   There are mild nonspecific white matter changes noted within the cerebral hemispheres bilaterally which while nonspecific, given the patient's age, likely represent sequelae of more remote small-vessel ischemic change.   There is a 13 mm  suspected incidental proteinaceous containing pineal cyst. Correlation with any previous outside MRI studies if available would be helpful to confirm stability. If none are available, further evaluation with post gadolinium MRI imaging would be recommended to confirm lack of associated pathologic enhancement.   There is a retention cyst or polyp noted within the inferior left maxillary sinus. Minimal mucosal thickening is noted within a few scattered ethmoid air cells.   The mastoid air cells are clear.   The MRA of the neck demonstrates no significant stenosis along the carotid bifurcations.  No significant stenosis is noted along the visualized cervical segments of the vertebral arteries.   The intracranial MRA demonstrates a small 2-3 mm outpouching of MRA signal projecting inferiorly off of the junction of the ophthalmic and communicating segments of the left internal carotid artery which may represent an infundibular origin of the left posterior communicating artery; however, the left posterior communicating artery is not clearly visualized on this intracranial MRA the possibility of a small aneurysm therefore can not be entirely excluded. No significant intracranial stenosis is noted.       There is no MRI evidence of acute infarction on the diffusion weighted images.   There are mild nonspecific white matter changes noted within the cerebral hemispheres bilaterally which while nonspecific, given the patient's age, likely represent sequelae of more remote small-vessel ischemic change.   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 Correlation with any previous outside MRI studies if available would be helpful to confirm stability. If none are available, further evaluation with post gadolinium MRI imaging would be recommended to confirm lack of associated pathologic enhancement.   The MRA of the neck demonstrates no significant stenosis along the carotid bifurcations. No  significant stenosis is noted along the visualized cervical segments of the vertebral arteries.   The intracranial MRA demonstrates a small 2-3 mm outpouching of MRA signal projecting inferiorly off of the junction of the ophthalmic and communicating segments of the left internal carotid artery which may represent an infundibular origin of the left posterior communicating artery; however, the left posterior communicating artery is not clearly visualized on this intracranial MRA the possibility of a small aneurysm therefore can not be entirely excluded. No significant intracranial stenosis is noted.     MACRO: Critical Finding:  See findings. Notification was initiated on 11/9/2023 at 6:50 am by  Jimmie Perez.  (**-YCF-**) Instructions:   Signed by: Jimmie Perez 11/9/2023 6:51 AM Dictation workstation:   [id number]    MR angio neck wo IV contrast    Result Date: 11/9/2023  Interpreted By:  Jimmie Perez, STUDY: MR BRAIN WO IV CONTRAST; MR ANGIO NECK WO IV CONTRAST; MR ANGIO HEAD WO IV CONTRAST;  11/8/2023 7:22 pm   INDICATION: Signs/Symptoms:cva r/o.   COMPARISON: CT of the head dated 11/08/2023   ACCESSION NUMBER(S): [id number]; [id number]; [id number]   ORDERING CLINICIAN: NIDIA CORTES   TECHNIQUE: Axial diffusion, axial T2, axial FLAIR, axial gradient echo T2, coronal T1, and sagittal T1 weighted MRI images of the brain were obtained without intravenous contrast administration. In addition, time-of-flight MRA images of the neck and intracranial vessels were obtained.  The source MRA images were reformatted in multiple planes.   FINDINGS: The diffusion weighted images fail to demonstrate evidence of abnormal diffusion restriction to suggest acute infarction.   The ventricular system is nondilated.   There are mild nonspecific white matter changes noted within the cerebral hemispheres bilaterally which while nonspecific, given the patient's age, likely represent sequelae of more remote small-vessel  ischemic change.   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 Correlation with any previous outside MRI studies if available would be helpful to confirm stability. If none are available, further evaluation with post gadolinium MRI imaging would be recommended to confirm lack of associated pathologic enhancement.   There is a retention cyst or polyp noted within the inferior left maxillary sinus. Minimal mucosal thickening is noted within a few scattered ethmoid air cells.   The mastoid air cells are clear.   The MRA of the neck demonstrates no significant stenosis along the carotid bifurcations.  No significant stenosis is noted along the visualized cervical segments of the vertebral arteries.   The intracranial MRA demonstrates a small 2-3 mm outpouching of MRA signal projecting inferiorly off of the junction of the ophthalmic and communicating segments of the left internal carotid artery which may represent an infundibular origin of the left posterior communicating artery; however, the left posterior communicating artery is not clearly visualized on this intracranial MRA the possibility of a small aneurysm therefore can not be entirely excluded. No significant intracranial stenosis is noted.       There is no MRI evidence of acute infarction on the diffusion weighted images.   There are mild nonspecific white matter changes noted within the cerebral hemispheres bilaterally which while nonspecific, given the patient's age, likely represent sequelae of more remote small-vessel ischemic change.   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 Correlation with any previous outside MRI studies if available would be helpful to confirm stability. If none are available, further evaluation with post gadolinium MRI imaging would be recommended to confirm lack of associated pathologic enhancement.   The MRA of the neck demonstrates no significant stenosis along  the carotid bifurcations. No significant stenosis is noted along the visualized cervical segments of the vertebral arteries.   The intracranial MRA demonstrates a small 2-3 mm outpouching of MRA signal projecting inferiorly off of the junction of the ophthalmic and communicating segments of the left internal carotid artery which may represent an infundibular origin of the left posterior communicating artery; however, the left posterior communicating artery is not clearly visualized on this intracranial MRA the possibility of a small aneurysm therefore can not be entirely excluded. No significant intracranial stenosis is noted.     MACRO: Critical Finding:  See findings. Notification was initiated on 11/9/2023 at 6:50 am by  Jimmie Perez.  (**-YCF-**) Instructions:   Signed by: Jimmie Perez 11/9/2023 6:51 AM Dictation workstation:   [id number]            Assessment/Plan   Principal Problem:    TIA (transient ischemic attack)    Concern for TIA  - neuro consulted  - statin/ASA  - MRI shows no acute infarction. 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 Small 2-3 mm outpouching of MRA signal projecting inferiorly off of the junction of the ophthalmic and communicating segments of the left internal carotid artery which may represent an infundibular origin of the left posterior communicating artery; however, the left posterior communicating artery is not clearly visualized on this intracranial MRA the possibility of a small aneurysm therefore can not be entirely excluded.     UTI  - CTX today is day 3  - Ucx pending    HTN  - norvasc  - hydrochlorothiazide  - lisinopril    Dispo: monitor clinically, await neuro recs, possibly dc later today         Jany Castro MD  Hospitalist

## 2023-11-10 NOTE — CARE PLAN
The patient's goals for the shift include   Problem: Safety - Adult  Goal: Free from fall injury  Outcome: Progressing     Problem: Chronic Conditions and Co-morbidities  Goal: Patient's chronic conditions and co-morbidity symptoms are monitored and maintained or improved  Outcome: Progressing     Problem: Fall/Injury  Goal: Not fall by end of shift  Outcome: Progressing  Goal: Be free from injury by end of the shift  Outcome: Progressing     Problem: General Stroke  Goal: Demonstrate improvement in neurological exam throughout the shift  Outcome: Progressing  Goal: Maintain BP within ordered limits throughout shift  Outcome: Progressing  Goal: Participate in treatment (ie., meds, therapy) throughout shift  Outcome: Progressing  Goal: No symptoms of aspiration throughout shift  Outcome: Progressing      The clinical goals for the shift include labs, vital signs, and neuro checks monitored Q4.     Over the shift, the patient did not make progress toward the following goals. Patient's neuro checks were within normal limits and VSS.  Patient's potassium was 3.9 and she was given Potassium phosphate IV for replacement. No complaints of pain or discomfort during the shift.

## 2023-11-10 NOTE — CONSULTS
60Inpatient consult to Neurology  Consult performed by: MONICA Bess-CNP  Consult ordered by: Ana Galvez DO          History Of Present Illness  Sherrie Chan is a 68 y.o. female presenting with stroke like symptoms and urinary symptoms. Pt reports that she has been having urinary frequency with mild right flank pain, similar to other UTIs in the past. She has been following up with urology. She reports associated brain fog, word finding difficulty, and headache. Her blood pressure was also higher than normal so she decided to come to the ER. Today, she feels much better. She denies any focal complaints. She did report that when this occurred, she saw flashing lights right before the symptoms started but no photophobia or nausea.  Past Medical History  Past Medical History:   Diagnosis Date    COVID-19 2021    COVID-19    Inflammatory polyarthropathy (CMS/HCC) 2021    Inflammatory polyarthritis    Personal history of other diseases of the respiratory system     History of asthma    Personal history of other mental and behavioral disorders     History of anxiety disorder     Surgical History  Past Surgical History:   Procedure Laterality Date     SECTION, CLASSIC  2017     Section    MR HEAD ANGIO WO IV CONTRAST  2023    MR HEAD ANGIO WO IV CONTRAST 2023 [facility]    MR NECK ANGIO WO IV CONTRAST  2023    MR NECK ANGIO WO IV CONTRAST 2023 [facility]    OTHER SURGICAL HISTORY  10/01/2019    Hysterectomy    OTHER SURGICAL HISTORY  10/01/2019    Tubal ligation     Social History  Social History     Tobacco Use    Smoking status: Never    Smokeless tobacco: Never   Vaping Use    Vaping Use: Never used   Substance Use Topics    Alcohol use: Not Currently    Drug use: Not Currently     Allergies  Nitrofurantoin monohyd/m-cryst, Statins-hmg-coa reductase inhibitors, and Sulfamethoxazole  Medications Prior to Admission   Medication Sig Dispense Refill Last  Dose    amLODIPine (Norvasc) 10 mg tablet TAKE 1 TABLET BY MOUTH ONCE DAILY 90 tablet 3 11/7/2023 at pm    cholecalciferol, vitamin D3, 25 mcg (1,000 unit) tablet,chewable Chew 1 tablet (25 mcg) once daily at bedtime.   11/7/2023 at pm    cod liver oiL oil Take 2 capsules by mouth once daily in the evening. Take with meals.   11/7/2023 at pm    D-MANNOSE ORAL Take 1 Scoop by mouth 2 times a day. Take 1 tab by mouth daily.   11/7/2023 at am    evolocumab (Repatha SureClick) 140 mg/mL injection INJECT 140MG (1 PEN) UNDER THE SKIN ONCE EVERY 2 WEEKS. 2 mL 11 10/25/2023    hydroCHLOROthiazide (HYDRODiuril) 25 mg tablet TAKE 1 TABLET BY MOUTH ONCE DAILY 90 tablet 3     lactobacillus acidophilus (Florajen Acidophilus) capsule Take 1 capsule by mouth once daily. In the afternoon   11/7/2023 at pm    lisinopril 10 mg tablet Take 1 tablet (10 mg) by mouth once daily. 90 tablet 3 11/7/2023 at pm    multivitamin tablet Take 1 tablet by mouth once daily in the evening. Take with meals.   11/7/2023 at pm       Review of Systems  ROS: 12 systems reviewed and negative except per HPI above    Neurological Exam  Physical Exam  Mental Status: Awake and alert. Oriented to person, place and time. Speech was fluent to history. Naming, repetition and comprehension were intact.   CN: VFF, PERRL, EOMI. Facial sensation was intact to light touch bilaterally. Facial expression was symmetric. Palate elevated symmetrically. Shoulder shrug was symmetric. Tongue protruded midline.   Motor: Normal muscle bulk and tone. Strength was (R/L) 5/5 shoulder abduction, elbow flexion/extension,  strenght, hip flexion, knee flexion/extension, ankle dorsi- and plantar flexion. There were no abnormal movements.   Sensory: Intact to light touch  in all 4 extremities.   Coordination: Finger to nose and heel to shin were intact with no dysmetria.     Gait: Narrow based and normal. Intact heel-raise, toe-raise and tandem gait.    Last Recorded Vitals  Blood  "pressure 129/60, pulse 72, temperature 35.9 °C (96.6 °F), temperature source Temporal, resp. rate 16, height 1.62 m (5' 3.78\"), weight 79.1 kg (174 lb 6.1 oz), SpO2 98 %.    Relevant Results    Scheduled medications  amLODIPine, 10 mg, oral, Daily  aspirin, 81 mg, oral, Daily  cefTRIAXone, 1 g, intravenous, q24h  cholecalciferol, 2,000 Units, oral, Daily  docusate sodium, 100 mg, oral, BID  enoxaparin, 40 mg, subcutaneous, q24h  hydroCHLOROthiazide, 25 mg, oral, Daily  lactobacillus acidophilus, 1 capsule, oral, Daily  lisinopril, 10 mg, oral, Daily  magnesium oxide, 400 mg, oral, Daily  perflutren lipid microspheres, 0.5-10 mL of dilution, intravenous, Once in imaging      Continuous medications     PRN medications  PRN medications: acetaminophen, oxygen  Results for orders placed or performed during the hospital encounter of 11/08/23 (from the past 24 hour(s))   Basic Metabolic Panel   Result Value Ref Range    Glucose 102 (H) 74 - 99 mg/dL    Sodium 138 136 - 145 mmol/L    Potassium 3.9 3.5 - 5.3 mmol/L    Chloride 100 98 - 107 mmol/L    Bicarbonate 28 21 - 32 mmol/L    Anion Gap 14 10 - 20 mmol/L    Urea Nitrogen 20 6 - 23 mg/dL    Creatinine 0.85 0.50 - 1.05 mg/dL    eGFR 74 >60 mL/min/1.73m*2    Calcium 10.4 (H) 8.6 - 10.3 mg/dL   Magnesium   Result Value Ref Range    Magnesium 1.69 1.60 - 2.40 mg/dL   Phosphorus   Result Value Ref Range    Phosphorus 2.9 2.5 - 4.9 mg/dL              I have personally reviewed the following imaging results Transthoracic Echo (TTE) Complete    Result Date: 11/9/2023            [facility] [address], [facility] [address]    Tel [phone] Fax [phone] TRANSTHORACIC ECHOCARDIOGRAM REPORT  Patient Name:      GEORGE RIA SPRAGUE     Reading Physician:    [id number] Gomez Kay MD Study Date:        11/9/2023            Ordering Provider:    [id number] NIDIA CABRERA"                  SEBASTIAN MRN/PID:           [id number]             Fellow: Accession#:        [id number]         Nurse:                Citlaly Escobedo RN Date of Birth/Age: 1955 / 68 years Sonographer:          Goldie PRESCOTT Gender:            F                    Additional Staff: Height:            160.02 cm            Admit Date: Weight:            78.93 kg             Admission Status:     Observation -                                                               Priority discharge BSA:               1.82 m2              Department Location:  [facility] Echo Lab Blood Pressure: 133 /62 mmHg Study Type:    TRANSTHORACIC ECHO (TTE) COMPLETE Diagnosis/ICD: Transient cerebral ischemic attack, unspecified (NOT on                LCD)-G45.9 Indication:    Transischemic Attack Patient History: Pertinent History: HTN, Hyperlipidemia and TIA. Study Detail: The following Echo studies were performed: 2D, M-Mode, Doppler and               color flow. Agitated saline used as a contrast agent for               intraseptal flow evaluation.  PHYSICIAN INTERPRETATION: Left Ventricle: Left ventricular systolic function is normal. There are no regional wall motion abnormalities. The left ventricular cavity size is normal. The left ventricular septal wall thickness is normal. There is normal left ventricular posterior wall thickness. Spectral Doppler shows a normal pattern of left ventricular diastolic filling. There are normal left ventricular filling pressures. LV Wall Scoring: All segments are normal. Left Atrium: The left atrium is normal in size. Right Ventricle: The right ventricle is normal in size. There is normal right ventricular global systolic function. Right Atrium: The right atrium is normal in size. Aortic Valve: The aortic valve is trileaflet. There is no aortic valve thickening. There is no evidence of aortic valve stenosis. There is no evidence of aortic valve regurgitation. The peak instantaneous gradient of the  aortic valve is 10.2 mmHg. Mitral Valve: The mitral valve is normal in structure. There is trace mitral valve regurgitation. Tricuspid Valve: The tricuspid valve is structurally normal. There is trace tricuspid regurgitation. The Doppler estimated RVSP is within normal limits at 22.9 mmHg. Pulmonic Valve: The pulmonic valve is structurally normal. There is no indication of pulmonic valve regurgitation. Pericardium: There is no pericardial effusion noted. Aorta: The aortic root is normal. There is no dilatation of the aortic arch. There is no dilatation of the ascending aorta. There is no dilatation of the aortic root. Systemic Veins: The inferior vena cava appears to be of normal size. The hepatic vein appears to be of normal size. There is IVC inspiratory collapse greater than 50%. The hepatic vein shows a normal flow pattern. Additional Comments: Good quality agitated saline study no PFO or shunt clip# 89.  CONCLUSIONS:  1. Left ventricular systolic function is normal.  2. RVSP within normal limits.  3. Aortic valve stenosis is not present.  4. Good quality agitated saline study no PFO or shunt clip# 89. QUANTITATIVE DATA SUMMARY: 2D MEASUREMENTS:                          Normal Ranges: LAs:           3.30 cm   (2.7-4.0cm) IVSd:          0.94 cm   (0.6-1.1cm) LVPWd:         0.95 cm   (0.6-1.1cm) LVIDd:         3.68 cm   (3.9-5.9cm) LVIDs:         2.34 cm LV Mass Index: 56.5 g/m2 LV % FS        36.4 % LA VOLUME:                       Normal Ranges: LA Area A4C: 10.1 cm2 LA Area A2C: 10.1 cm2 LA Vol A4C:  20.0 ml LA Vol A2C:  21.0 ml LA Vol BP:   20.0 ml M-MODE MEASUREMENTS:                  Normal Ranges: Ao Root: 2.40 cm (2.0-3.7cm) AoV Exc: 1.50 cm (1.5-2.5cm) AORTA MEASUREMENTS:                    Normal Ranges: AoV Exc:   1.50 cm (1.5-2.5cm) Asc Ao, d: 2.80 cm (2.1-3.4cm) LV SYSTOLIC FUNCTION BY 2D PLANIMETRY (MOD):                     Normal Ranges: EF-A4C View: 62.7 % (>=55%) EF-A2C View: 61.7 % EF-Biplane:   62.1 % LV DIASTOLIC FUNCTION:                        Normal Ranges: MV Peak E:    0.56 m/s (0.7-1.2 m/s) MV Peak A:    0.91 m/s (0.42-0.7 m/s) E/A Ratio:    0.62     (1.0-2.2) MV e'         0.08 m/s (>8.0) MV lateral e' 0.10 m/s MV medial e'  0.07 m/s E/e' Ratio:   6.62     (<8.0) MITRAL VALVE:                 Normal Ranges: MV DT: 250 msec (150-240msec) AORTIC VALVE:                          Normal Ranges: AoV Vmax:      1.60 m/s  (<=1.7m/s) AoV Peak PG:   10.2 mmHg (<20mmHg) LVOT Max Michael:  1.10 m/s  (<=1.1m/s) LVOT Diameter: 2.00 cm   (1.8-2.4cm) AoV Area,Vmax: 2.16 cm2  (2.5-4.5cm2)  RIGHT VENTRICLE: RV Basal 2.70 cm RV Mid   2.50 cm RV Major 5.8 cm TAPSE:   19.0 mm TRICUSPID VALVE/RVSP:                             Normal Ranges: Peak TR Velocity: 2.23 m/s RV Syst Pressure: 22.9 mmHg (< 30mmHg) PULMONIC VALVE:                         Normal Ranges: PV Accel Time: 137 msec (>120ms)  [id number] Gomez Kay MD Electronically signed on 11/9/2023 at 7:52:12 PM  Wall Scoring  ** Final **     MR brain wo IV contrast    Result Date: 11/9/2023  Interpreted By:  Jimmie Perez, STUDY: MR BRAIN WO IV CONTRAST; MR ANGIO NECK WO IV CONTRAST; MR ANGIO HEAD WO IV CONTRAST;  11/8/2023 7:22 pm   INDICATION: Signs/Symptoms:cva r/o.   COMPARISON: CT of the head dated 11/08/2023   ACCESSION NUMBER(S): [id number]; [id number]; [id number]   ORDERING CLINICIAN: NIDIA CORTES   TECHNIQUE: Axial diffusion, axial T2, axial FLAIR, axial gradient echo T2, coronal T1, and sagittal T1 weighted MRI images of the brain were obtained without intravenous contrast administration. In addition, time-of-flight MRA images of the neck and intracranial vessels were obtained.  The source MRA images were reformatted in multiple planes.   FINDINGS: The diffusion weighted images fail to demonstrate evidence of abnormal diffusion restriction to suggest acute infarction.   The ventricular system is nondilated.   There are mild nonspecific white matter  changes noted within the cerebral hemispheres bilaterally which while nonspecific, given the patient's age, likely represent sequelae of more remote small-vessel ischemic change.   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 Correlation with any previous outside MRI studies if available would be helpful to confirm stability. If none are available, further evaluation with post gadolinium MRI imaging would be recommended to confirm lack of associated pathologic enhancement.   There is a retention cyst or polyp noted within the inferior left maxillary sinus. Minimal mucosal thickening is noted within a few scattered ethmoid air cells.   The mastoid air cells are clear.   The MRA of the neck demonstrates no significant stenosis along the carotid bifurcations.  No significant stenosis is noted along the visualized cervical segments of the vertebral arteries.   The intracranial MRA demonstrates a small 2-3 mm outpouching of MRA signal projecting inferiorly off of the junction of the ophthalmic and communicating segments of the left internal carotid artery which may represent an infundibular origin of the left posterior communicating artery; however, the left posterior communicating artery is not clearly visualized on this intracranial MRA the possibility of a small aneurysm therefore can not be entirely excluded. No significant intracranial stenosis is noted.       There is no MRI evidence of acute infarction on the diffusion weighted images.   There are mild nonspecific white matter changes noted within the cerebral hemispheres bilaterally which while nonspecific, given the patient's age, likely represent sequelae of more remote small-vessel ischemic change.   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 Correlation with any previous outside MRI studies if available would be helpful to confirm stability. If none are available, further evaluation with post  gadolinium MRI imaging would be recommended to confirm lack of associated pathologic enhancement.   The MRA of the neck demonstrates no significant stenosis along the carotid bifurcations. No significant stenosis is noted along the visualized cervical segments of the vertebral arteries.   The intracranial MRA demonstrates a small 2-3 mm outpouching of MRA signal projecting inferiorly off of the junction of the ophthalmic and communicating segments of the left internal carotid artery which may represent an infundibular origin of the left posterior communicating artery; however, the left posterior communicating artery is not clearly visualized on this intracranial MRA the possibility of a small aneurysm therefore can not be entirely excluded. No significant intracranial stenosis is noted.     MACRO: Critical Finding:  See findings. Notification was initiated on 11/9/2023 at 6:50 am by  Jimmie Perez.  (**-YCF-**) Instructions:   Signed by: Jimmie Perez 11/9/2023 6:51 AM Dictation workstation:   [id number]    MR angio head wo IV contrast    Result Date: 11/9/2023  Interpreted By:  Jimmie Perez, STUDY: MR BRAIN WO IV CONTRAST; MR ANGIO NECK WO IV CONTRAST; MR ANGIO HEAD WO IV CONTRAST;  11/8/2023 7:22 pm   INDICATION: Signs/Symptoms:cva r/o.   COMPARISON: CT of the head dated 11/08/2023   ACCESSION NUMBER(S): [id number]; [id number]; [id number]   ORDERING CLINICIAN: NIDIA CORTES   TECHNIQUE: Axial diffusion, axial T2, axial FLAIR, axial gradient echo T2, coronal T1, and sagittal T1 weighted MRI images of the brain were obtained without intravenous contrast administration. In addition, time-of-flight MRA images of the neck and intracranial vessels were obtained.  The source MRA images were reformatted in multiple planes.   FINDINGS: The diffusion weighted images fail to demonstrate evidence of abnormal diffusion restriction to suggest acute infarction.   The ventricular system is nondilated.   There are  mild nonspecific white matter changes noted within the cerebral hemispheres bilaterally which while nonspecific, given the patient's age, likely represent sequelae of more remote small-vessel ischemic change.   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 Correlation with any previous outside MRI studies if available would be helpful to confirm stability. If none are available, further evaluation with post gadolinium MRI imaging would be recommended to confirm lack of associated pathologic enhancement.   There is a retention cyst or polyp noted within the inferior left maxillary sinus. Minimal mucosal thickening is noted within a few scattered ethmoid air cells.   The mastoid air cells are clear.   The MRA of the neck demonstrates no significant stenosis along the carotid bifurcations.  No significant stenosis is noted along the visualized cervical segments of the vertebral arteries.   The intracranial MRA demonstrates a small 2-3 mm outpouching of MRA signal projecting inferiorly off of the junction of the ophthalmic and communicating segments of the left internal carotid artery which may represent an infundibular origin of the left posterior communicating artery; however, the left posterior communicating artery is not clearly visualized on this intracranial MRA the possibility of a small aneurysm therefore can not be entirely excluded. No significant intracranial stenosis is noted.       There is no MRI evidence of acute infarction on the diffusion weighted images.   There are mild nonspecific white matter changes noted within the cerebral hemispheres bilaterally which while nonspecific, given the patient's age, likely represent sequelae of more remote small-vessel ischemic change.   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 Correlation with any previous outside MRI studies if available would be helpful to confirm stability. If none are available,  further evaluation with post gadolinium MRI imaging would be recommended to confirm lack of associated pathologic enhancement.   The MRA of the neck demonstrates no significant stenosis along the carotid bifurcations. No significant stenosis is noted along the visualized cervical segments of the vertebral arteries.   The intracranial MRA demonstrates a small 2-3 mm outpouching of MRA signal projecting inferiorly off of the junction of the ophthalmic and communicating segments of the left internal carotid artery which may represent an infundibular origin of the left posterior communicating artery; however, the left posterior communicating artery is not clearly visualized on this intracranial MRA the possibility of a small aneurysm therefore can not be entirely excluded. No significant intracranial stenosis is noted.     MACRO: Critical Finding:  See findings. Notification was initiated on 11/9/2023 at 6:50 am by  Jimmie Perez.  (**-YCF-**) Instructions:   Signed by: Jimmie Perez 11/9/2023 6:51 AM Dictation workstation:   [id number]    MR angio neck wo IV contrast    Result Date: 11/9/2023  Interpreted By:  Jimmie Perez, STUDY: MR BRAIN WO IV CONTRAST; MR ANGIO NECK WO IV CONTRAST; MR ANGIO HEAD WO IV CONTRAST;  11/8/2023 7:22 pm   INDICATION: Signs/Symptoms:cva r/o.   COMPARISON: CT of the head dated 11/08/2023   ACCESSION NUMBER(S): [id number]; [id number]; [id number]   ORDERING CLINICIAN: NIDIA CORTES   TECHNIQUE: Axial diffusion, axial T2, axial FLAIR, axial gradient echo T2, coronal T1, and sagittal T1 weighted MRI images of the brain were obtained without intravenous contrast administration. In addition, time-of-flight MRA images of the neck and intracranial vessels were obtained.  The source MRA images were reformatted in multiple planes.   FINDINGS: The diffusion weighted images fail to demonstrate evidence of abnormal diffusion restriction to suggest acute infarction.   The ventricular system  is nondilated.   There are mild nonspecific white matter changes noted within the cerebral hemispheres bilaterally which while nonspecific, given the patient's age, likely represent sequelae of more remote small-vessel ischemic change.   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 Correlation with any previous outside MRI studies if available would be helpful to confirm stability. If none are available, further evaluation with post gadolinium MRI imaging would be recommended to confirm lack of associated pathologic enhancement.   There is a retention cyst or polyp noted within the inferior left maxillary sinus. Minimal mucosal thickening is noted within a few scattered ethmoid air cells.   The mastoid air cells are clear.   The MRA of the neck demonstrates no significant stenosis along the carotid bifurcations.  No significant stenosis is noted along the visualized cervical segments of the vertebral arteries.   The intracranial MRA demonstrates a small 2-3 mm outpouching of MRA signal projecting inferiorly off of the junction of the ophthalmic and communicating segments of the left internal carotid artery which may represent an infundibular origin of the left posterior communicating artery; however, the left posterior communicating artery is not clearly visualized on this intracranial MRA the possibility of a small aneurysm therefore can not be entirely excluded. No significant intracranial stenosis is noted.       There is no MRI evidence of acute infarction on the diffusion weighted images.   There are mild nonspecific white matter changes noted within the cerebral hemispheres bilaterally which while nonspecific, given the patient's age, likely represent sequelae of more remote small-vessel ischemic change.   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 Correlation with any previous outside MRI studies if available would be helpful to confirm  stability. If none are available, further evaluation with post gadolinium MRI imaging would be recommended to confirm lack of associated pathologic enhancement.   The MRA of the neck demonstrates no significant stenosis along the carotid bifurcations. No significant stenosis is noted along the visualized cervical segments of the vertebral arteries.   The intracranial MRA demonstrates a small 2-3 mm outpouching of MRA signal projecting inferiorly off of the junction of the ophthalmic and communicating segments of the left internal carotid artery which may represent an infundibular origin of the left posterior communicating artery; however, the left posterior communicating artery is not clearly visualized on this intracranial MRA the possibility of a small aneurysm therefore can not be entirely excluded. No significant intracranial stenosis is noted.     MACRO: Critical Finding:  See findings. Notification was initiated on 11/9/2023 at 6:50 am by  Jimmie Perez.  (**-YCF-**) Instructions:   Signed by: Jimmie Perez 11/9/2023 6:51 AM Dictation workstation:   [id number]    CT head wo IV contrast    Result Date: 11/8/2023  Interpreted By:  Seble Cervantes, STUDY: CT HEAD WO IV CONTRAST;  11/8/2023 2:18 pm   INDICATION: Signs/Symptoms:dizzy.   COMPARISON: None.   ACCESSION NUMBER(S): [id number]   ORDERING CLINICIAN: GANESH SUBRAMANIAN   TECHNIQUE: Unenhanced CT images of the head were obtained.   FINDINGS: The ventricles, cisterns and sulci are prominent, consistent with diffuse volume loss. There are areas of nonspecific white matter hypodensity, which are probably age related or microvascular in nature.   There is no acute intracranial hemorrhage, mass effect or midline shift. No extraaxial fluid collection.   No focal calvarial lesion.   Visualized paranasal sinuses are clear.       No acute intracranial hemorrhage or mass-effect.   MACRO: None.   Signed by: Seble Cervantes 11/8/2023 2:35 PM Dictation workstation:    [id number]  .      Assessment/Plan   Principal Problem:    TIA (transient ischemic attack)    Suspect confusional migraine with onset of brain fog, word finding, and headache preceded by flashing lights.   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    CT angio head and neck revealed:  1.  3 mm inferiorly orientated focal outpouching arising  from the supraclinoid segment of the left ICA which may reflect an  aneurysm or could reflect an infundibulum for the left posterior  communicating artery, however the left posterior communicating artery  is not visualized.      2. No narrowing of the visualized arteries in the head.      I suggest :    Follow up by Neuro surgery as outpatient  regarding cerebral aneurysm evaluation.       Follow up with Neurology in 4-6 weeks as  outpatient or sooner as needed  Headache diary    Should patient develop any symptoms concerning for SAH like severe headache, neck rigidity, light sensitivity the she is recommended to go to the nearest emergency room.       I spent 80 minutes in the professional and overall care of this patient.      Isabelle Garcia M.D.

## 2023-11-10 NOTE — DISCHARGE INSTRUCTIONS
Signs and symptoms of a stroke:    B. Balance - watch for sudden loss of balance  E. Eyes - Check for vision loss  F. Face - Look for an uneven smile  A. Arm - Check if one arm is weak  S. Speech - Listen for slurred speech  T. Time - Call 911 right away    If you experience any of these symptoms, call 911 and return to the emergency room for evaluation.     After a stroke, physical and occupational therapy are an important step in the recovery process. Whether you are going to a skilled nursing facility (SNF), Acute rehab, or even home with PT/OT, once those programs are finished you may still qualify to continue with outpatient therapy. Reach out to your primary care provider for orders to continue therapy.     Be sure to follow up with neurology as ordered.     [place] [facility] [place]    [address] [place] [address] [address], [address]  [place] [id number] [phone]    Continue your medications as ordered and do not stop taking a prescribed medication without discussing with your physician. Maintain a healthy diet and exercise program to try and prevent another stroke from occurring.

## 2023-11-11 ENCOUNTER — APPOINTMENT (OUTPATIENT)
Dept: RADIOLOGY | Facility: HOSPITAL | Age: 68
End: 2023-11-11
Payer: MEDICARE

## 2023-11-11 VITALS
RESPIRATION RATE: 18 BRPM | SYSTOLIC BLOOD PRESSURE: 153 MMHG | WEIGHT: 174.38 LBS | BODY MASS INDEX: 29.77 KG/M2 | HEART RATE: 85 BPM | TEMPERATURE: 98.2 F | HEIGHT: 64 IN | DIASTOLIC BLOOD PRESSURE: 66 MMHG | OXYGEN SATURATION: 97 %

## 2023-11-11 LAB
ALBUMIN SERPL BCP-MCNC: 4 G/DL (ref 3.4–5)
ALP SERPL-CCNC: 41 U/L (ref 33–136)
ALT SERPL W P-5'-P-CCNC: 12 U/L (ref 7–45)
ANION GAP SERPL CALC-SCNC: 14 MMOL/L (ref 10–20)
AST SERPL W P-5'-P-CCNC: 15 U/L (ref 9–39)
BASOPHILS # BLD AUTO: 0.05 X10*3/UL (ref 0–0.1)
BASOPHILS NFR BLD AUTO: 0.7 %
BILIRUB SERPL-MCNC: 0.3 MG/DL (ref 0–1.2)
BUN SERPL-MCNC: 19 MG/DL (ref 6–23)
CALCIUM SERPL-MCNC: 10.1 MG/DL (ref 8.6–10.3)
CHLORIDE SERPL-SCNC: 99 MMOL/L (ref 98–107)
CO2 SERPL-SCNC: 28 MMOL/L (ref 21–32)
CREAT SERPL-MCNC: 0.79 MG/DL (ref 0.5–1.05)
EOSINOPHIL # BLD AUTO: 0.2 X10*3/UL (ref 0–0.7)
EOSINOPHIL NFR BLD AUTO: 2.6 %
ERYTHROCYTE [DISTWIDTH] IN BLOOD BY AUTOMATED COUNT: 14.5 % (ref 11.5–14.5)
GFR SERPL CREATININE-BSD FRML MDRD: 81 ML/MIN/1.73M*2
GLUCOSE SERPL-MCNC: 104 MG/DL (ref 74–99)
HCT VFR BLD AUTO: 40.7 % (ref 36–46)
HGB BLD-MCNC: 13 G/DL (ref 12–16)
IMM GRANULOCYTES # BLD AUTO: 0.02 X10*3/UL (ref 0–0.7)
IMM GRANULOCYTES NFR BLD AUTO: 0.3 % (ref 0–0.9)
LYMPHOCYTES # BLD AUTO: 2.5 X10*3/UL (ref 1.2–4.8)
LYMPHOCYTES NFR BLD AUTO: 32.8 %
MCH RBC QN AUTO: 27.4 PG (ref 26–34)
MCHC RBC AUTO-ENTMCNC: 31.9 G/DL (ref 32–36)
MCV RBC AUTO: 86 FL (ref 80–100)
MONOCYTES # BLD AUTO: 0.73 X10*3/UL (ref 0.1–1)
MONOCYTES NFR BLD AUTO: 9.6 %
NEUTROPHILS # BLD AUTO: 4.13 X10*3/UL (ref 1.2–7.7)
NEUTROPHILS NFR BLD AUTO: 54 %
NRBC BLD-RTO: 0 /100 WBCS (ref 0–0)
PLATELET # BLD AUTO: 256 X10*3/UL (ref 150–450)
POTASSIUM SERPL-SCNC: 3.7 MMOL/L (ref 3.5–5.3)
PROT SERPL-MCNC: 7 G/DL (ref 6.4–8.2)
RBC # BLD AUTO: 4.75 X10*6/UL (ref 4–5.2)
SODIUM SERPL-SCNC: 137 MMOL/L (ref 136–145)
WBC # BLD AUTO: 7.6 X10*3/UL (ref 4.4–11.3)

## 2023-11-11 PROCEDURE — 84075 ASSAY ALKALINE PHOSPHATASE: CPT | Performed by: STUDENT IN AN ORGANIZED HEALTH CARE EDUCATION/TRAINING PROGRAM

## 2023-11-11 PROCEDURE — 70496 CT ANGIOGRAPHY HEAD: CPT | Performed by: RADIOLOGY

## 2023-11-11 PROCEDURE — 2500000004 HC RX 250 GENERAL PHARMACY W/ HCPCS (ALT 636 FOR OP/ED): Performed by: STUDENT IN AN ORGANIZED HEALTH CARE EDUCATION/TRAINING PROGRAM

## 2023-11-11 PROCEDURE — 2500000001 HC RX 250 WO HCPCS SELF ADMINISTERED DRUGS (ALT 637 FOR MEDICARE OP): Performed by: STUDENT IN AN ORGANIZED HEALTH CARE EDUCATION/TRAINING PROGRAM

## 2023-11-11 PROCEDURE — 85025 COMPLETE CBC W/AUTO DIFF WBC: CPT | Performed by: STUDENT IN AN ORGANIZED HEALTH CARE EDUCATION/TRAINING PROGRAM

## 2023-11-11 PROCEDURE — 70496 CT ANGIOGRAPHY HEAD: CPT

## 2023-11-11 PROCEDURE — 99238 HOSP IP/OBS DSCHRG MGMT 30/<: CPT | Performed by: INTERNAL MEDICINE

## 2023-11-11 PROCEDURE — 36415 COLL VENOUS BLD VENIPUNCTURE: CPT | Performed by: STUDENT IN AN ORGANIZED HEALTH CARE EDUCATION/TRAINING PROGRAM

## 2023-11-11 PROCEDURE — 2550000001 HC RX 255 CONTRASTS: Performed by: INTERNAL MEDICINE

## 2023-11-11 PROCEDURE — G0378 HOSPITAL OBSERVATION PER HR: HCPCS

## 2023-11-11 RX ORDER — ASPIRIN 81 MG/1
81 TABLET ORAL DAILY
Qty: 30 TABLET | Refills: 0 | Status: SHIPPED | OUTPATIENT
Start: 2023-11-12 | End: 2023-12-12

## 2023-11-11 RX ADMIN — HYDROCHLOROTHIAZIDE 25 MG: 25 TABLET ORAL at 08:21

## 2023-11-11 RX ADMIN — Medication 1 CAPSULE: at 08:20

## 2023-11-11 RX ADMIN — Medication 2000 UNITS: at 06:20

## 2023-11-11 RX ADMIN — IOHEXOL 50 ML: 350 INJECTION, SOLUTION INTRAVENOUS at 07:44

## 2023-11-11 RX ADMIN — LISINOPRIL 10 MG: 10 TABLET ORAL at 08:20

## 2023-11-11 RX ADMIN — DOCUSATE SODIUM 100 MG: 100 CAPSULE, LIQUID FILLED ORAL at 08:20

## 2023-11-11 RX ADMIN — Medication 400 MG: at 08:21

## 2023-11-11 RX ADMIN — AMLODIPINE BESYLATE 10 MG: 10 TABLET ORAL at 08:20

## 2023-11-11 RX ADMIN — ASPIRIN 81 MG: 81 TABLET, COATED ORAL at 08:20

## 2023-11-11 ASSESSMENT — PAIN SCALES - GENERAL: PAINLEVEL_OUTOF10: 0 - NO PAIN

## 2023-11-11 NOTE — CARE PLAN
Problem: Safety - Adult  Goal: Free from fall injury  Outcome: Progressing     Problem: Discharge Planning  Goal: Discharge to home or other facility with appropriate resources  Outcome: Progressing     Problem: Chronic Conditions and Co-morbidities  Goal: Patient's chronic conditions and co-morbidity symptoms are monitored and maintained or improved  Outcome: Progressing     Problem: Fall/Injury  Goal: Not fall by end of shift  Outcome: Progressing  Goal: Be free from injury by end of the shift  Outcome: Progressing  Goal: Verbalize understanding of personal risk factors for fall in the hospital  Outcome: Progressing  Goal: Verbalize understanding of risk factor reduction measures to prevent injury from fall in the home  Outcome: Progressing  Goal: Use assistive devices by end of the shift  Outcome: Progressing  Goal: Pace activities to prevent fatigue by end of the shift  Outcome: Progressing   The patient's goals for the shift include      The clinical goals for the shift include no neuro deficits noted

## 2023-11-11 NOTE — DISCHARGE SUMMARY
Discharge Diagnosis  TIA (transient ischemic attack)    Issues Requiring Follow-Up  Ask about CTA results from hospitalization    Discharge Meds     Your medication list        START taking these medications        Instructions Last Dose Given Next Dose Due   aspirin 81 mg EC tablet  Start taking on: November 12, 2023      Take 1 tablet (81 mg) by mouth once daily. Do not start before November 12, 2023.              CONTINUE taking these medications        Instructions Last Dose Given Next Dose Due   amLODIPine 10 mg tablet  Commonly known as: Norvasc      TAKE 1 TABLET BY MOUTH ONCE DAILY       cholecalciferol (vitamin D3) 25 mcg (1,000 unit) tablet,chewable           cod liver oiL oil           D-MANNOSE ORAL           Florajen Acidophilus capsule  Generic drug: lactobacillus acidophilus           hydroCHLOROthiazide 25 mg tablet  Commonly known as: HYDRODiuril      TAKE 1 TABLET BY MOUTH ONCE DAILY       lisinopril 10 mg tablet      Take 1 tablet (10 mg) by mouth once daily.       multivitamin tablet           Repatha SureClick 140 mg/mL injection  Generic drug: evolocumab      INJECT 140MG (1 PEN) UNDER THE SKIN ONCE EVERY 2 WEEKS.                 Where to Get Your Medications        These medications were sent to [id number] - [facility] [address]  [address] [address]      Phone: [phone]   aspirin 81 mg EC tablet         Test Results Pending At Discharge  Pending Labs       No current pending labs.            Hospital Course   History Of Present Illness  Sherrie Chan is a 68 y.o. female presenting with strokelike symptoms and urinary symptoms.  Patient reports that she has been having some increasing urinary frequency, suprapubic pain and some mild right flank pain.  She states this is similar to when she has had urinary tract infections in the past.  She reports that she has been frequently and has seen urology with them and they were trying to  avoid antibiotics but she feels like she is gotten worse and worse.  She reports some associated chills as well as what she describes as brain fog.  She states this is also not uncommon with her urinary tract infections.  She states while at physical therapy yesterday she was also having significant trouble with word finding having difficulty reading the signs.  She states she could see the words but they were not making sense and she describes it as if she were reading a foreign language.  She states that she was able to make it through her appointment and had deep tissue massage.  She reports she was doing a little better after that she went home and had a severe headache and went to bed.  She reports she woke up a couple hours later and felt a lot better.  She states that she was feeling a little bit off today as well to and with the higher blood pressure she decided to come in for further evaluation.     In the ED, vital signs were significant for a heart rate of 96 and a blood pressure of 185/81.  This did improve.  Labs are significant for a calcium of 10.5 and a total protein of 8.3.  UA was positive for moderate blood and trace leukocyte Estrace.  CT head showed no acute findings.  Neurology was consulted and recommended admission for TIA work-up.     FULL CODE    Hospital course: Patient was admitted with concern for CVA. Patient had negative MRI. She was seen by neurology who wished for CTA to be done prior to discharge. This did show either an aneurysm or an infundibulum communicating with non-visualized PCA. Patient was wishing to be discharged asap given observation status and this was felt reasonable given she was asymptomatic for several days. Patient also completed treatment for UTI while in the hospital. She has follow up with urology. Patient was discharged home with addition of aspirin but this may have been metabolic encephalopathy from UTI, but it is unclear. She was discharged home in stable  condition and has close follow up with PCP.      Pertinent Physical Exam At Time of Discharge  Physical Exam  Constitutional:       General: She is not in acute distress.     Appearance: Normal appearance.   HENT:      Head: Normocephalic and atraumatic.      Mouth/Throat:      Mouth: Mucous membranes are moist.   Eyes:      Extraocular Movements: Extraocular movements intact.      Conjunctiva/sclera: Conjunctivae normal.      Pupils: Pupils are equal, round, and reactive to light.   Cardiovascular:      Rate and Rhythm: Normal rate and regular rhythm.      Heart sounds: Normal heart sounds.   Pulmonary:      Effort: Pulmonary effort is normal.      Breath sounds: Normal breath sounds. No wheezing, rhonchi or rales.   Abdominal:      General: Abdomen is flat. Bowel sounds are normal.      Palpations: Abdomen is soft.   Musculoskeletal:         General: No swelling or tenderness. Normal range of motion.      Cervical back: Normal range of motion and neck supple.   Skin:     General: Skin is warm and dry.      Findings: No rash.   Neurological:      General: No focal deficit present.      Mental Status: She is alert and oriented to person, place, and time.      Cranial Nerves: No cranial nerve deficit.      Sensory: No sensory deficit.   Psychiatric:         Mood and Affect: Mood normal.         Behavior: Behavior normal.         Outpatient Follow-Up  Future Appointments   Date Time Provider Department Center   12/14/2023  3:40 PM Tony Bentiez MD [id number] [place]   1/5/2024  2:00 PM Ayse N Bernabale, PT [id number] [facility]   1/12/2024  2:00 PM Ayse N Bernabale, PT [id number] [facility]   1/19/2024  2:00 PM Ayse N Bernabale, PT [id number] [facility]   1/26/2024  2:00 PM Ayse N Bernabale, PT [id number] [facility]   2/2/2024  2:00 PM Ayse N Bernabale, PT [id number] [facility]   2/9/2024  2:00 PM Ayse N Bernabale, PT [id number] [facility]   2/16/2024  2:00 PM Ayse N Bernabale, PT [id number] [facility]   2/23/2024  2:00 PM Ayse N Bernabale, PT  [id number] [place]   10/3/2024 10:00 AM Eufemia Ascencio DO [id number] [place]         Gamaliel Kenney MD

## 2023-11-13 ENCOUNTER — APPOINTMENT (OUTPATIENT)
Dept: PRIMARY CARE | Facility: CLINIC | Age: 68
End: 2023-11-13
Payer: MEDICARE

## 2023-11-13 ENCOUNTER — APPOINTMENT (OUTPATIENT)
Dept: PHYSICAL THERAPY | Facility: CLINIC | Age: 68
End: 2023-11-13
Payer: MEDICARE

## 2023-11-13 ENCOUNTER — OFFICE VISIT (OUTPATIENT)
Dept: PRIMARY CARE | Facility: CLINIC | Age: 68
End: 2023-11-13
Payer: MEDICARE

## 2023-11-13 ENCOUNTER — LAB (OUTPATIENT)
Dept: LAB | Facility: LAB | Age: 68
End: 2023-11-13
Payer: MEDICARE

## 2023-11-13 ENCOUNTER — PATIENT OUTREACH (OUTPATIENT)
Dept: CARE COORDINATION | Facility: CLINIC | Age: 68
End: 2023-11-13

## 2023-11-13 VITALS
BODY MASS INDEX: 29.71 KG/M2 | SYSTOLIC BLOOD PRESSURE: 140 MMHG | RESPIRATION RATE: 16 BRPM | HEART RATE: 88 BPM | WEIGHT: 174 LBS | DIASTOLIC BLOOD PRESSURE: 76 MMHG | TEMPERATURE: 98.1 F | OXYGEN SATURATION: 100 % | HEIGHT: 64 IN

## 2023-11-13 DIAGNOSIS — I10 PRIMARY HYPERTENSION: ICD-10-CM

## 2023-11-13 DIAGNOSIS — F41.9 ANXIETY: ICD-10-CM

## 2023-11-13 DIAGNOSIS — N39.0 RECURRENT URINARY TRACT INFECTION: Primary | ICD-10-CM

## 2023-11-13 DIAGNOSIS — G93.41 METABOLIC ENCEPHALOPATHY: ICD-10-CM

## 2023-11-13 DIAGNOSIS — R51.9 ACUTE NONINTRACTABLE HEADACHE, UNSPECIFIED HEADACHE TYPE: ICD-10-CM

## 2023-11-13 DIAGNOSIS — M35.3 PMR (POLYMYALGIA RHEUMATICA) (MULTI): ICD-10-CM

## 2023-11-13 DIAGNOSIS — I72.9 ANEURYSM (CMS-HCC): ICD-10-CM

## 2023-11-13 PROBLEM — G45.9 TIA (TRANSIENT ISCHEMIC ATTACK): Status: RESOLVED | Noted: 2023-11-08 | Resolved: 2023-11-13

## 2023-11-13 LAB — ERYTHROCYTE [SEDIMENTATION RATE] IN BLOOD BY WESTERGREN METHOD: 8 MM/H (ref 0–30)

## 2023-11-13 PROCEDURE — 3077F SYST BP >= 140 MM HG: CPT | Performed by: INTERNAL MEDICINE

## 2023-11-13 PROCEDURE — 1126F AMNT PAIN NOTED NONE PRSNT: CPT | Performed by: INTERNAL MEDICINE

## 2023-11-13 PROCEDURE — 1036F TOBACCO NON-USER: CPT | Performed by: INTERNAL MEDICINE

## 2023-11-13 PROCEDURE — 99496 TRANSJ CARE MGMT HIGH F2F 7D: CPT | Performed by: INTERNAL MEDICINE

## 2023-11-13 PROCEDURE — 1159F MED LIST DOCD IN RCRD: CPT | Performed by: INTERNAL MEDICINE

## 2023-11-13 PROCEDURE — 85652 RBC SED RATE AUTOMATED: CPT

## 2023-11-13 PROCEDURE — 1160F RVW MEDS BY RX/DR IN RCRD: CPT | Performed by: INTERNAL MEDICINE

## 2023-11-13 PROCEDURE — 3078F DIAST BP <80 MM HG: CPT | Performed by: INTERNAL MEDICINE

## 2023-11-13 PROCEDURE — 36415 COLL VENOUS BLD VENIPUNCTURE: CPT

## 2023-11-13 RX ORDER — ALPRAZOLAM 0.5 MG/1
0.5 TABLET ORAL 3 TIMES DAILY PRN
Qty: 21 TABLET | Refills: 0 | Status: SHIPPED | OUTPATIENT
Start: 2023-11-13 | End: 2023-11-14

## 2023-11-13 RX ORDER — ALPRAZOLAM 0.5 MG/1
0.5 TABLET ORAL 3 TIMES DAILY PRN
Qty: 30 TABLET | Refills: 2 | Status: SHIPPED | OUTPATIENT
Start: 2023-11-13 | End: 2023-11-13 | Stop reason: SDUPTHER

## 2023-11-13 RX ORDER — AMOXICILLIN 500 MG/1
500 CAPSULE ORAL DAILY
Qty: 90 CAPSULE | Refills: 0 | Status: SHIPPED | OUTPATIENT
Start: 2023-11-13 | End: 2023-12-12 | Stop reason: ALTCHOICE

## 2023-11-13 RX ORDER — CEFDINIR 300 MG/1
300 CAPSULE ORAL 2 TIMES DAILY
Qty: 20 CAPSULE | Refills: 0 | Status: SHIPPED | OUTPATIENT
Start: 2023-11-13 | End: 2023-11-23

## 2023-11-13 NOTE — PROGRESS NOTES
"Yeny Chan is a 68 y.o. female who presents for Hospital Follow-up.    HPI   Hospitalized [facility] 11/8/23 - 11/11/23  Dx: TIA  Labs, MRI brain, MRA head and neck, CTA Head  Med changes: ASA 81 mg  Follow up: PCP  Feeling overwhelmed/stressed/anxious.    ? Aneurysm cta/mra  Nl echo      Review of Systems   Constitutional:  Positive for fatigue. Negative for fever.   Respiratory:  Negative for cough and shortness of breath.    Cardiovascular:  Negative for chest pain and leg swelling.   All other systems reviewed and are negative.      Health Maintenance Due   Topic Date Due    Hepatitis A Vaccines (1 of 2 - Risk 2-dose series) Never done    Zoster Vaccines (1 of 2) Never done    Hepatitis B Vaccines (1 of 3 - Risk 3-dose series) Never done    DTaP/Tdap/Td Vaccines (2 - Td or Tdap) 01/27/2019    Pneumococcal Vaccine: 65+ Years (2 - PPSV23 or PCV20) 05/16/2020    COVID-19 Vaccine (4 - Pfizer series) 12/19/2021    Mammogram  09/08/2023    Bone Density Scan  10/28/2023       Objective   /76   Pulse 88   Temp 36.7 °C (98.1 °F)   Resp 16   Ht 1.626 m (5' 4\")   Wt 78.9 kg (174 lb)   SpO2 100%   BMI 29.87 kg/m²     Physical Exam  Vitals and nursing note reviewed.   Constitutional:       Appearance: Normal appearance.   HENT:      Head: Normocephalic.   Eyes:      Conjunctiva/sclera: Conjunctivae normal.      Pupils: Pupils are equal, round, and reactive to light.   Cardiovascular:      Rate and Rhythm: Normal rate and regular rhythm.      Pulses: Normal pulses.      Heart sounds: Normal heart sounds.   Pulmonary:      Effort: Pulmonary effort is normal.      Breath sounds: Normal breath sounds.   Musculoskeletal:         General: No swelling.      Cervical back: Neck supple.   Skin:     General: Skin is warm and dry.   Neurological:      General: No focal deficit present.      Mental Status: She is oriented to person, place, and time.         Assessment/Plan   Problem List Items Addressed This " Visit       Hypertension    PMR (polymyalgia rheumatica) (CMS/[facility])    Relevant Orders    Sedimentation Rate (Completed)    Recurrent urinary tract infection - Primary    Relevant Medications    amoxicillin (Amoxil) 500 mg capsule    cefdinir (Omnicef) 300 mg capsule     Other Visit Diagnoses       Acute nonintractable headache, unspecified headache type        Relevant Orders    Referral to Neurology    Metabolic encephalopathy        Anxiety        Aneurysm (CMS/[facility])                TIA vs Migraine vs metabolic encephalopathy related to UTI  HLD  Reviewed all records  Cont meds  Will treat with abx low dose daily  Abx to add asap uti  No records of urine dna  Refer to neuro to advise on aneurysm/infundibulum fu  Check est  Call if any changes

## 2023-11-13 NOTE — PROGRESS NOTES
Discharge Facility: [facility]  Discharge Diagnosis: Temporary loss of blood supply to brain  Admission Date: 11/8/2023  Discharge Date: 11/11/2023    PCP Appointment Date:  -Pt reports she called office and was able to be placed on schedule for today 11/13/2023 at 1545- message sent to clerical pool to confirm    Hospital Encounter and Summary: Linked    See discharge assessment below for further details    Engagement  Call Start Time: 1022 (11/13/2023 10:23 AM)    Medications  Medications reviewed with patient/caregiver?: Yes (new medication reviewed; aspirin) (11/13/2023 10:23 AM)  Is the patient having any side effects they believe may be caused by any medication additions or changes?: No (11/13/2023 10:23 AM)  Does the patient have all medications ordered at discharge?: Yes (11/13/2023 10:23 AM)  Care Management Interventions: No intervention needed (11/13/2023 10:23 AM)  Is the patient taking all medications as directed (includes completed medication regime)?: Yes (11/13/2023 10:23 AM)    Appointments  Does the patient have a primary care provider?: Yes (11/13/2023 10:23 AM)  Has the patient kept scheduled appointments due by today?: Yes (11/13/2023 10:23 AM)    Self Management  Has home health visited the patient within 72 hours of discharge?: Not applicable (11/13/2023 10:23 AM)    Patient Teaching  Does the patient have access to their discharge instructions?: Yes (11/13/2023 10:23 AM)  Care Management Interventions: Reviewed instructions with patient (11/13/2023 10:23 AM)  What is the patient's perception of their health status since discharge?: Improving (11/13/2023 10:23 AM)    Wrap Up  Wrap Up Additional Comments: Pt was admitted to [facility] 11/8-11/11. Pt reports confusion regarding hospitalization. Pt states she was told she might have had a mini stroke and reports she feels she did not get any answers. Pt states she had a panic attack on Friday night and is very stressed. Pt reports that she will  be seeing PCP today at 1545- message sent to office clerical pool to confirm. Attempted to complete post discharge assessment- call was dropped. Attempted to call pt back unable to reach. Voicemail left with call back number. Pt returned call 1033- pt reports symptoms from UTI have subsided. Pt reports that she has questions for PCP that she will discuss with her at appt. Pt started taking aspirin per discharge instructions. Pt encouraged to call if questions arise prior to or after PCP appt. (11/13/2023 10:23 AM)  Call End Time: 1027 (11/13/2023 10:23 AM)

## 2023-11-14 ENCOUNTER — APPOINTMENT (OUTPATIENT)
Dept: PRIMARY CARE | Facility: CLINIC | Age: 68
End: 2023-11-14
Payer: MEDICARE

## 2023-11-14 ENCOUNTER — TELEPHONE (OUTPATIENT)
Dept: PRIMARY CARE | Facility: CLINIC | Age: 68
End: 2023-11-14

## 2023-11-14 RX ORDER — ALPRAZOLAM 0.5 MG/1
TABLET ORAL
Qty: 21 TABLET | Refills: 0 | Status: SHIPPED | OUTPATIENT
Start: 2023-11-14 | End: 2024-02-12 | Stop reason: SDUPTHER

## 2023-11-14 ASSESSMENT — ENCOUNTER SYMPTOMS
FATIGUE: 1
FEVER: 0
SHORTNESS OF BREATH: 0
COUGH: 0

## 2023-11-14 NOTE — TELEPHONE ENCOUNTER
----- Message from Eufemia Ascencio DO sent at 11/14/2023 10:15 AM EST -----  Call patient labs are normal

## 2023-11-16 ENCOUNTER — APPOINTMENT (OUTPATIENT)
Dept: UROLOGY | Facility: CLINIC | Age: 68
End: 2023-11-16
Payer: MEDICARE

## 2023-11-16 ENCOUNTER — PATIENT OUTREACH (OUTPATIENT)
Dept: CARE COORDINATION | Facility: CLINIC | Age: 68
End: 2023-11-16
Payer: MEDICARE

## 2023-11-16 NOTE — PROGRESS NOTES
Call regarding appt. with PCP on 11/13/2023 after hospitalization.  At time of outreach call the patient feels as if their condition has improved since last visit.  Reviewed the PCP appointment with the pt and addressed any questions or concerns.  Pt reports her appt went well and that she feels better after discussing hospitalization with Dr. Ascencio. Pt denies any questions, needs, or concerns at this time. Pt reports she is no issues with new prescriptions.   Verified next PCP appt 12/12/2023 1030.  Pt encouraged to call if questions or needs arise.

## 2023-11-30 ENCOUNTER — SPECIALTY PHARMACY (OUTPATIENT)
Dept: PHARMACY | Facility: CLINIC | Age: 68
End: 2023-11-30

## 2023-12-12 ENCOUNTER — OFFICE VISIT (OUTPATIENT)
Dept: PRIMARY CARE | Facility: CLINIC | Age: 68
End: 2023-12-12
Payer: MEDICARE

## 2023-12-12 VITALS
RESPIRATION RATE: 16 BRPM | HEART RATE: 76 BPM | TEMPERATURE: 98.1 F | BODY MASS INDEX: 30.05 KG/M2 | HEIGHT: 64 IN | DIASTOLIC BLOOD PRESSURE: 74 MMHG | SYSTOLIC BLOOD PRESSURE: 128 MMHG | WEIGHT: 176 LBS | OXYGEN SATURATION: 100 %

## 2023-12-12 DIAGNOSIS — F41.9 ANXIETY AND DEPRESSION: ICD-10-CM

## 2023-12-12 DIAGNOSIS — F32.A ANXIETY AND DEPRESSION: ICD-10-CM

## 2023-12-12 DIAGNOSIS — N39.0 RECURRENT URINARY TRACT INFECTION: Primary | ICD-10-CM

## 2023-12-12 PROCEDURE — 3078F DIAST BP <80 MM HG: CPT | Performed by: INTERNAL MEDICINE

## 2023-12-12 PROCEDURE — 99214 OFFICE O/P EST MOD 30 MIN: CPT | Performed by: INTERNAL MEDICINE

## 2023-12-12 PROCEDURE — 1126F AMNT PAIN NOTED NONE PRSNT: CPT | Performed by: INTERNAL MEDICINE

## 2023-12-12 PROCEDURE — 1159F MED LIST DOCD IN RCRD: CPT | Performed by: INTERNAL MEDICINE

## 2023-12-12 PROCEDURE — 1160F RVW MEDS BY RX/DR IN RCRD: CPT | Performed by: INTERNAL MEDICINE

## 2023-12-12 PROCEDURE — 3074F SYST BP LT 130 MM HG: CPT | Performed by: INTERNAL MEDICINE

## 2023-12-12 PROCEDURE — 1036F TOBACCO NON-USER: CPT | Performed by: INTERNAL MEDICINE

## 2023-12-12 RX ORDER — CEFDINIR 300 MG/1
300 CAPSULE ORAL 2 TIMES DAILY
Qty: 20 CAPSULE | Refills: 0 | Status: SHIPPED | OUTPATIENT
Start: 2023-12-12 | End: 2023-12-22

## 2023-12-12 RX ORDER — CITALOPRAM 10 MG/1
10 TABLET ORAL DAILY
Qty: 30 TABLET | Refills: 5 | Status: SHIPPED | OUTPATIENT
Start: 2023-12-12 | End: 2024-06-09

## 2023-12-12 RX ORDER — DOXYCYCLINE HYCLATE 50 MG/1
50 CAPSULE ORAL DAILY
Qty: 30 CAPSULE | Refills: 1 | Status: SHIPPED | OUTPATIENT
Start: 2023-12-12 | End: 2024-02-12 | Stop reason: SINTOL

## 2023-12-12 ASSESSMENT — PAIN SCALES - GENERAL: PAINLEVEL: 0-NO PAIN

## 2023-12-12 NOTE — PROGRESS NOTES
"Subjective   Sherrie Chan is a 68 y.o. female who presents for 1 month Follow-up.    HPI   Pt reports improvement in Anxiety.  Taking daily ATB.  Had increase in UTI sx's (Pelvic pain, back pain, incontinence)  Reports taking Cefdinir x1 week last week.  UTI sx's have improved.  Continues w/headaches daily, occasional difficulty w/sleep.  Rare use of Xanax.    Noted increase in headaches after weaning off of Celexa.  Completed in Sept.    Review of Systems   Constitutional:  Negative for fatigue and fever.   Respiratory:  Negative for cough and shortness of breath.    Cardiovascular:  Negative for chest pain and leg swelling.   All other systems reviewed and are negative.      Health Maintenance Due   Topic Date Due    Hepatitis A Vaccines (1 of 2 - Risk 2-dose series) Never done    Zoster Vaccines (1 of 2) Never done    Hepatitis B Vaccines (1 of 3 - Risk 3-dose series) Never done    DTaP/Tdap/Td Vaccines (2 - Td or Tdap) 01/27/2019    Pneumococcal Vaccine: 65+ Years (2 - PPSV23 or PCV20) 05/16/2020    COVID-19 Vaccine (4 - Pfizer series) 12/19/2021    Mammogram  09/08/2023    Bone Density Scan  10/28/2023       Objective   /74   Pulse 76   Temp 36.7 °C (98.1 °F)   Resp 16   Ht 1.626 m (5' 4\")   Wt 79.8 kg (176 lb)   SpO2 100%   BMI 30.21 kg/m²     Physical Exam  Vitals and nursing note reviewed.   Constitutional:       Appearance: Normal appearance.   HENT:      Head: Normocephalic.   Eyes:      Conjunctiva/sclera: Conjunctivae normal.      Pupils: Pupils are equal, round, and reactive to light.   Cardiovascular:      Rate and Rhythm: Normal rate and regular rhythm.      Pulses: Normal pulses.      Heart sounds: Normal heart sounds.   Pulmonary:      Effort: Pulmonary effort is normal.      Breath sounds: Normal breath sounds.   Musculoskeletal:         General: No swelling.      Cervical back: Neck supple.   Skin:     General: Skin is warm and dry.   Neurological:      General: No focal deficit " present.      Mental Status: She is oriented to person, place, and time.         Assessment/Plan   Problem List Items Addressed This Visit       Anxiety and depression    Relevant Medications    citalopram (CeleXA) 10 mg tablet    Recurrent urinary tract infection - Primary    Relevant Medications    cefdinir (Omnicef) 300 mg capsule    doxycycline (Vibramycin) 50 mg capsule     Restart celexa  Reviewed labs  Call if headaches not improving  Trial low dose doxy  Reviewed culture results

## 2023-12-13 ASSESSMENT — ENCOUNTER SYMPTOMS
FATIGUE: 0
COUGH: 0
FEVER: 0
SHORTNESS OF BREATH: 0

## 2023-12-14 ENCOUNTER — APPOINTMENT (OUTPATIENT)
Dept: UROLOGY | Facility: CLINIC | Age: 68
End: 2023-12-14
Payer: MEDICARE

## 2023-12-14 ENCOUNTER — PATIENT OUTREACH (OUTPATIENT)
Dept: CARE COORDINATION | Facility: CLINIC | Age: 68
End: 2023-12-14
Payer: MEDICARE

## 2023-12-14 NOTE — PROGRESS NOTES
Call placed regarding one month post discharge follow up call.  At time of outreach call the patient feels as if their condition has improved since initial visit with PCP or specialist.  Pt had appt withi PCP 12/12/2023.  Pt started 3 new prescriptions; cefdinir, celexa, and doxycycline hyclate and denies any issues.  Questions or concerns regarding recovery period addressed at this time.   Reviewed any PCP or specialists progress notes/labs/radiology reports if applicable and addressed any questions or concerns.  Verified next PCP appt 3/14/2024 1000.  Pt denies any questions, needs, or concerns at this time. She is encouraged to call if questions arise.

## 2023-12-18 ENCOUNTER — SPECIALTY PHARMACY (OUTPATIENT)
Dept: PHARMACY | Facility: CLINIC | Age: 68
End: 2023-12-18

## 2023-12-18 PROCEDURE — RXMED WILLOW AMBULATORY MEDICATION CHARGE

## 2023-12-22 ENCOUNTER — PHARMACY VISIT (OUTPATIENT)
Dept: PHARMACY | Facility: CLINIC | Age: 68
End: 2023-12-22
Payer: COMMERCIAL

## 2024-01-05 ENCOUNTER — APPOINTMENT (OUTPATIENT)
Dept: PHYSICAL THERAPY | Facility: CLINIC | Age: 69
End: 2024-01-05
Payer: MEDICARE

## 2024-01-10 ENCOUNTER — SPECIALTY PHARMACY (OUTPATIENT)
Dept: PHARMACY | Facility: CLINIC | Age: 69
End: 2024-01-10

## 2024-01-10 PROCEDURE — RXMED WILLOW AMBULATORY MEDICATION CHARGE

## 2024-01-12 ENCOUNTER — APPOINTMENT (OUTPATIENT)
Dept: PHYSICAL THERAPY | Facility: CLINIC | Age: 69
End: 2024-01-12
Payer: MEDICARE

## 2024-01-19 ENCOUNTER — APPOINTMENT (OUTPATIENT)
Dept: PHYSICAL THERAPY | Facility: CLINIC | Age: 69
End: 2024-01-19
Payer: MEDICARE

## 2024-01-23 ENCOUNTER — PHARMACY VISIT (OUTPATIENT)
Dept: PHARMACY | Facility: CLINIC | Age: 69
End: 2024-01-23
Payer: COMMERCIAL

## 2024-01-26 ENCOUNTER — APPOINTMENT (OUTPATIENT)
Dept: PHYSICAL THERAPY | Facility: CLINIC | Age: 69
End: 2024-01-26
Payer: MEDICARE

## 2024-02-01 ENCOUNTER — PATIENT OUTREACH (OUTPATIENT)
Dept: CARE COORDINATION | Facility: CLINIC | Age: 69
End: 2024-02-01
Payer: MEDICARE

## 2024-02-01 NOTE — PROGRESS NOTES
Call placed regarding 90 day post discharge follow up call.  Pt states for the most part she has improved since hospitalization. She states she has some ongoing issues that she is working with PCP. Pt reports that she thinks she will need to switch her maintenance antibiotic but she would prefer to wait until her next appt.  Pt reports she has all of the medications that she needs.    Verified upcoming appts:  -2/22/2024 1600 Dr. De [facility]  -3/14/2024 1000 PCP    Pt denies any questions, needs, or concerns. She is encouraged to call if questions or needs arise.

## 2024-02-02 ENCOUNTER — APPOINTMENT (OUTPATIENT)
Dept: PHYSICAL THERAPY | Facility: CLINIC | Age: 69
End: 2024-02-02
Payer: MEDICARE

## 2024-02-09 ENCOUNTER — APPOINTMENT (OUTPATIENT)
Dept: PHYSICAL THERAPY | Facility: CLINIC | Age: 69
End: 2024-02-09
Payer: MEDICARE

## 2024-02-12 ENCOUNTER — OFFICE VISIT (OUTPATIENT)
Dept: PRIMARY CARE | Facility: CLINIC | Age: 69
End: 2024-02-12
Payer: MEDICARE

## 2024-02-12 VITALS
BODY MASS INDEX: 30.56 KG/M2 | OXYGEN SATURATION: 100 % | SYSTOLIC BLOOD PRESSURE: 156 MMHG | HEIGHT: 64 IN | WEIGHT: 179 LBS | DIASTOLIC BLOOD PRESSURE: 72 MMHG | TEMPERATURE: 98.2 F | RESPIRATION RATE: 16 BRPM | HEART RATE: 80 BPM

## 2024-02-12 DIAGNOSIS — I72.9 ANEURYSM (CMS-HCC): Primary | ICD-10-CM

## 2024-02-12 DIAGNOSIS — I10 PRIMARY HYPERTENSION: ICD-10-CM

## 2024-02-12 DIAGNOSIS — F33.1 MODERATE EPISODE OF RECURRENT MAJOR DEPRESSIVE DISORDER (MULTI): ICD-10-CM

## 2024-02-12 DIAGNOSIS — N18.31 CHRONIC KIDNEY DISEASE, STAGE 3A (MULTI): ICD-10-CM

## 2024-02-12 DIAGNOSIS — M35.3 PMR (POLYMYALGIA RHEUMATICA) (MULTI): ICD-10-CM

## 2024-02-12 DIAGNOSIS — Z79.899 HIGH RISK MEDICATION USE: ICD-10-CM

## 2024-02-12 DIAGNOSIS — F41.9 ANXIETY: ICD-10-CM

## 2024-02-12 DIAGNOSIS — N39.0 RECURRENT URINARY TRACT INFECTION: ICD-10-CM

## 2024-02-12 LAB
AMPHETAMINES UR QL SCN: ABNORMAL
BARBITURATES UR QL SCN: ABNORMAL
BZE UR QL SCN: ABNORMAL
CANNABINOIDS UR QL SCN: ABNORMAL
CREAT UR-MCNC: 19.3 MG/DL (ref 20–320)
PCP UR QL SCN: ABNORMAL
POC APPEARANCE, URINE: CLEAR
POC BILIRUBIN, URINE: NEGATIVE
POC BLOOD, URINE: ABNORMAL
POC COLOR, URINE: ABNORMAL
POC GLUCOSE, URINE: NEGATIVE MG/DL
POC KETONES, URINE: NEGATIVE MG/DL
POC LEUKOCYTES, URINE: NEGATIVE
POC NITRITE,URINE: NEGATIVE
POC PH, URINE: 5 PH
POC PROTEIN, URINE: NEGATIVE MG/DL
POC SPECIFIC GRAVITY, URINE: <=1.005
POC UROBILINOGEN, URINE: 0.2 EU/DL

## 2024-02-12 PROCEDURE — 1036F TOBACCO NON-USER: CPT | Performed by: INTERNAL MEDICINE

## 2024-02-12 PROCEDURE — 80307 DRUG TEST PRSMV CHEM ANLYZR: CPT

## 2024-02-12 PROCEDURE — 1160F RVW MEDS BY RX/DR IN RCRD: CPT | Performed by: INTERNAL MEDICINE

## 2024-02-12 PROCEDURE — 80354 DRUG SCREENING FENTANYL: CPT

## 2024-02-12 PROCEDURE — 80361 OPIATES 1 OR MORE: CPT

## 2024-02-12 PROCEDURE — 87086 URINE CULTURE/COLONY COUNT: CPT

## 2024-02-12 PROCEDURE — 81001 URINALYSIS AUTO W/SCOPE: CPT

## 2024-02-12 PROCEDURE — 1126F AMNT PAIN NOTED NONE PRSNT: CPT | Performed by: INTERNAL MEDICINE

## 2024-02-12 PROCEDURE — 80368 SEDATIVE HYPNOTICS: CPT

## 2024-02-12 PROCEDURE — 80358 DRUG SCREENING METHADONE: CPT

## 2024-02-12 PROCEDURE — 3077F SYST BP >= 140 MM HG: CPT | Performed by: INTERNAL MEDICINE

## 2024-02-12 PROCEDURE — 82570 ASSAY OF URINE CREATININE: CPT

## 2024-02-12 PROCEDURE — 80373 DRUG SCREENING TRAMADOL: CPT

## 2024-02-12 PROCEDURE — 81002 URINALYSIS NONAUTO W/O SCOPE: CPT | Performed by: INTERNAL MEDICINE

## 2024-02-12 PROCEDURE — 99214 OFFICE O/P EST MOD 30 MIN: CPT | Performed by: INTERNAL MEDICINE

## 2024-02-12 PROCEDURE — 1159F MED LIST DOCD IN RCRD: CPT | Performed by: INTERNAL MEDICINE

## 2024-02-12 PROCEDURE — 81003 URINALYSIS AUTO W/O SCOPE: CPT

## 2024-02-12 PROCEDURE — 80346 BENZODIAZEPINES1-12: CPT

## 2024-02-12 PROCEDURE — 3078F DIAST BP <80 MM HG: CPT | Performed by: INTERNAL MEDICINE

## 2024-02-12 PROCEDURE — 80365 DRUG SCREENING OXYCODONE: CPT

## 2024-02-12 PROCEDURE — 1123F ACP DISCUSS/DSCN MKR DOCD: CPT | Performed by: INTERNAL MEDICINE

## 2024-02-12 RX ORDER — AMOXICILLIN AND CLAVULANATE POTASSIUM 875; 125 MG/1; MG/1
875 TABLET, FILM COATED ORAL 2 TIMES DAILY
Qty: 20 TABLET | Refills: 0 | Status: SHIPPED | OUTPATIENT
Start: 2024-02-12 | End: 2024-02-22 | Stop reason: ALTCHOICE

## 2024-02-12 RX ORDER — ALPRAZOLAM 0.5 MG/1
TABLET ORAL
Qty: 30 TABLET | Refills: 0 | Status: SHIPPED | OUTPATIENT
Start: 2024-02-12 | End: 2024-04-25

## 2024-02-12 RX ORDER — LISINOPRIL 10 MG/1
20 TABLET ORAL 2 TIMES DAILY
Qty: 360 TABLET | Refills: 3 | Status: SHIPPED | OUTPATIENT
Start: 2024-02-12 | End: 2024-03-14 | Stop reason: SDUPTHER

## 2024-02-12 ASSESSMENT — PATIENT HEALTH QUESTIONNAIRE - PHQ9
2. FEELING DOWN, DEPRESSED OR HOPELESS: NOT AT ALL
1. LITTLE INTEREST OR PLEASURE IN DOING THINGS: NOT AT ALL
SUM OF ALL RESPONSES TO PHQ9 QUESTIONS 1 AND 2: 0

## 2024-02-12 NOTE — PROGRESS NOTES
OARRS:  No data recorded  I have personally reviewed the OARRS report for Sherrie Chan. I have considered the risks of abuse, dependence, addiction and diversion    Is the patient prescribed a combination of a benzodiazepine and opioid?  Yes, I feel it is clincially indicated to continue the medication and have discussed with the patient risks/benefits/alternatives.    Last Urine Drug Screen / ordered today: Yes  Recent Results (from the past 8760 hour(s))   Screen Opiate/Opioid/Benzo Prescription Compliance    Collection Time: 02/12/24  4:43 PM   Result Value Ref Range    Creatinine, Urine Random 19.3 (L) 20.0 - 320.0 mg/dL    Amphetamine Screen, Urine Presumptive Negative Presumptive Negative    Barbiturate Screen, Urine Presumptive Negative Presumptive Negative    Cannabinoid Screen, Urine Presumptive Negative Presumptive Negative    Cocaine Metabolite Screen, Urine Presumptive Negative Presumptive Negative    PCP Screen, Urine Presumptive Negative Presumptive Negative     Results are as expected.     Clinical rationale for not completing a Urine Drug Screen: Patient suffering from anuria or incontinent      Controlled Substance Agreement:  Date of the Last Agreement: 2/12/24  Reviewed Controlled Substance Agreement including but not limited to the benefits, risks, and alternatives to treatment with a Controlled Substance medication(s).    Benzodiazepines:  What is the patient's goal of therapy? Stress relief  Is this being achieved with current treatment? yes    ODILIA-7:  No data recorded    Activities of Daily Living:   Is your overall impression that this patient is benefiting (symptom reduction outweighs side effects) from benzodiazepine therapy? Yes     1. Physical Functioning: Better  2. Family Relationship: Better  3. Social Relationship: Better  4. Mood: Better  5. Sleep Patterns: Better  6. Overall Function: BetterSubjective   Sherrie Chan is a 68 y.o. female who presents for recurrent UTI.    HPI  "  Pt reports BP elevation.  Thought she had UTI x1-2 weeks ago.  Woozy, pelvic pressure, pain.  Took x5 days of ATB BID.  Denies further UTI sx's.  Has felt \"woozy\".  BP: 180/90 over past week.  C/o headache.  Tried Xanax, voices some improvement in BP.    Review of Systems   Constitutional:  Positive for fatigue. Negative for fever.   Respiratory:  Negative for cough and shortness of breath.    Cardiovascular:  Negative for chest pain and leg swelling.   All other systems reviewed and are negative.      Health Maintenance Due   Topic Date Due    Hepatitis A Vaccines (1 of 2 - Risk 2-dose series) Never done    Zoster Vaccines (1 of 2) Never done    Hepatitis B Vaccines (1 of 3 - Risk 3-dose series) Never done    DTaP/Tdap/Td Vaccines (2 - Td or Tdap) 01/27/2019    Pneumococcal Vaccine: 65+ Years (2 - PPSV23 or PCV20) 05/16/2020    COVID-19 Vaccine (4 - 2023-24 season) 09/01/2023    Mammogram  09/08/2023       Objective   /72   Pulse 80   Temp 36.8 °C (98.2 °F)   Resp 16   Ht 1.626 m (5' 4\")   Wt 81.2 kg (179 lb)   SpO2 100%   BMI 30.73 kg/m²     Physical Exam  Vitals and nursing note reviewed.   Constitutional:       Appearance: Normal appearance.   HENT:      Head: Normocephalic.   Eyes:      Conjunctiva/sclera: Conjunctivae normal.      Pupils: Pupils are equal, round, and reactive to light.   Cardiovascular:      Rate and Rhythm: Normal rate and regular rhythm.      Pulses: Normal pulses.      Heart sounds: Normal heart sounds.   Pulmonary:      Effort: Pulmonary effort is normal.      Breath sounds: Normal breath sounds.   Musculoskeletal:         General: No swelling.      Cervical back: Neck supple.   Skin:     General: Skin is warm and dry.   Neurological:      General: No focal deficit present.      Mental Status: She is oriented to person, place, and time.         Assessment/Plan   Problem List Items Addressed This Visit       Chronic kidney disease, stage 3a (CMS/HCC)    Depression    " Hypertension    Relevant Medications    lisinopril 10 mg tablet    PMR (polymyalgia rheumatica) (CMS/[facility])    Relevant Orders    Sedimentation Rate    Recurrent urinary tract infection    Relevant Medications    amoxicillin-pot clavulanate (Augmentin) 875-125 mg tablet    Other Relevant Orders    POCT UA (nonautomated) manually resulted (Completed)    Urinalysis with Reflex Microscopic (Completed)    Urine Culture    CBC    Comprehensive Metabolic Panel    Sedimentation Rate    Microscopic Only, Urine (Completed)    Aneurysm (CMS/[facility]) - Primary     Other Visit Diagnoses       High risk medication use        Relevant Orders    Opiate/Opioid/Benzo Prescription Compliance    OOB Internal Tracking    Specific Gravity, Urine (Completed)    Anxiety        Relevant Medications    ALPRAZolam (Xanax) 0.5 mg tablet          I have personally reviewed patients OARRS report.  This report is scanned into the emr.  I have considered the risks of abuse, dependence, addiction and diversion.  Stable based on symptoms and exam.  Continue established treatment plan and follow up at least yearly.  Increase lisinopril  Fu in 6 weeks  Abx for uti check culture

## 2024-02-13 ENCOUNTER — LAB (OUTPATIENT)
Dept: LAB | Facility: LAB | Age: 69
End: 2024-02-13
Payer: MEDICARE

## 2024-02-13 DIAGNOSIS — N39.0 RECURRENT URINARY TRACT INFECTION: ICD-10-CM

## 2024-02-13 DIAGNOSIS — M35.3 PMR (POLYMYALGIA RHEUMATICA) (MULTI): ICD-10-CM

## 2024-02-13 LAB
ALBUMIN SERPL BCP-MCNC: 4.1 G/DL (ref 3.4–5)
ALP SERPL-CCNC: 48 U/L (ref 33–136)
ALT SERPL W P-5'-P-CCNC: 13 U/L (ref 7–45)
ANION GAP SERPL CALC-SCNC: 14 MMOL/L (ref 10–20)
APPEARANCE UR: CLEAR
AST SERPL W P-5'-P-CCNC: 15 U/L (ref 9–39)
BACTERIA UR CULT: NORMAL
BILIRUB SERPL-MCNC: 0.3 MG/DL (ref 0–1.2)
BILIRUB UR STRIP.AUTO-MCNC: NEGATIVE MG/DL
BUN SERPL-MCNC: 23 MG/DL (ref 6–23)
CALCIUM SERPL-MCNC: 9.6 MG/DL (ref 8.6–10.3)
CHLORIDE SERPL-SCNC: 96 MMOL/L (ref 98–107)
CO2 SERPL-SCNC: 32 MMOL/L (ref 21–32)
COLOR UR: COLORLESS
CREAT SERPL-MCNC: 0.98 MG/DL (ref 0.5–1.05)
EGFRCR SERPLBLD CKD-EPI 2021: 62 ML/MIN/1.73M*2
ERYTHROCYTE [DISTWIDTH] IN BLOOD BY AUTOMATED COUNT: 15 % (ref 11.5–14.5)
ERYTHROCYTE [SEDIMENTATION RATE] IN BLOOD BY WESTERGREN METHOD: 26 MM/H (ref 0–30)
GLUCOSE SERPL-MCNC: 133 MG/DL (ref 74–99)
GLUCOSE UR STRIP.AUTO-MCNC: NORMAL MG/DL
HCT VFR BLD AUTO: 40.1 % (ref 36–46)
HGB BLD-MCNC: 12.8 G/DL (ref 12–16)
KETONES UR STRIP.AUTO-MCNC: NEGATIVE MG/DL
LEUKOCYTE ESTERASE UR QL STRIP.AUTO: NEGATIVE
MCH RBC QN AUTO: 27.5 PG (ref 26–34)
MCHC RBC AUTO-ENTMCNC: 31.9 G/DL (ref 32–36)
MCV RBC AUTO: 86 FL (ref 80–100)
NITRITE UR QL STRIP.AUTO: NEGATIVE
NRBC BLD-RTO: 0 /100 WBCS (ref 0–0)
PH UR STRIP.AUTO: 6 [PH]
PLATELET # BLD AUTO: 280 X10*3/UL (ref 150–450)
POTASSIUM SERPL-SCNC: 3.7 MMOL/L (ref 3.5–5.3)
PROT SERPL-MCNC: 7 G/DL (ref 6.4–8.2)
PROT UR STRIP.AUTO-MCNC: NEGATIVE MG/DL
RBC # BLD AUTO: 4.65 X10*6/UL (ref 4–5.2)
RBC # UR STRIP.AUTO: ABNORMAL /UL
RBC #/AREA URNS AUTO: NORMAL /HPF
SODIUM SERPL-SCNC: 138 MMOL/L (ref 136–145)
SP GR UR STRIP.AUTO: 1
SP GR UR STRIP.AUTO: 1
UROBILINOGEN UR STRIP.AUTO-MCNC: NORMAL MG/DL
WBC # BLD AUTO: 6.9 X10*3/UL (ref 4.4–11.3)
WBC #/AREA URNS AUTO: NORMAL /HPF

## 2024-02-13 PROCEDURE — 80053 COMPREHEN METABOLIC PANEL: CPT

## 2024-02-13 PROCEDURE — 36415 COLL VENOUS BLD VENIPUNCTURE: CPT

## 2024-02-13 PROCEDURE — 85027 COMPLETE CBC AUTOMATED: CPT

## 2024-02-13 PROCEDURE — 85652 RBC SED RATE AUTOMATED: CPT

## 2024-02-13 ASSESSMENT — ENCOUNTER SYMPTOMS
FEVER: 0
COUGH: 0
SHORTNESS OF BREATH: 0
FATIGUE: 1

## 2024-02-14 ENCOUNTER — TELEPHONE (OUTPATIENT)
Dept: PRIMARY CARE | Facility: CLINIC | Age: 69
End: 2024-02-14
Payer: MEDICARE

## 2024-02-14 ENCOUNTER — SPECIALTY PHARMACY (OUTPATIENT)
Dept: PHARMACY | Facility: CLINIC | Age: 69
End: 2024-02-14

## 2024-02-14 PROCEDURE — RXMED WILLOW AMBULATORY MEDICATION CHARGE

## 2024-02-14 NOTE — TELEPHONE ENCOUNTER
----- Message from Eufemia Ascencio DO sent at 2/14/2024  2:37 PM EST -----  Call patient labs look good except sugar is borderline   Want her to monitor her carbs and will recheck

## 2024-02-16 ENCOUNTER — APPOINTMENT (OUTPATIENT)
Dept: PHYSICAL THERAPY | Facility: CLINIC | Age: 69
End: 2024-02-16
Payer: MEDICARE

## 2024-02-22 ENCOUNTER — OFFICE VISIT (OUTPATIENT)
Dept: NEUROLOGY | Facility: CLINIC | Age: 69
End: 2024-02-22
Payer: MEDICARE

## 2024-02-22 VITALS
BODY MASS INDEX: 29.02 KG/M2 | WEIGHT: 170 LBS | SYSTOLIC BLOOD PRESSURE: 144 MMHG | HEIGHT: 64 IN | HEART RATE: 76 BPM | DIASTOLIC BLOOD PRESSURE: 76 MMHG | RESPIRATION RATE: 16 BRPM

## 2024-02-22 DIAGNOSIS — I67.1 INTERNAL CAROTID ANEURYSM (HHS-HCC): ICD-10-CM

## 2024-02-22 DIAGNOSIS — G43.709 CHRONIC MIGRAINE W/O AURA W/O STATUS MIGRAINOSUS, NOT INTRACTABLE: Primary | ICD-10-CM

## 2024-02-22 PROCEDURE — 3078F DIAST BP <80 MM HG: CPT | Performed by: STUDENT IN AN ORGANIZED HEALTH CARE EDUCATION/TRAINING PROGRAM

## 2024-02-22 PROCEDURE — 1126F AMNT PAIN NOTED NONE PRSNT: CPT | Performed by: STUDENT IN AN ORGANIZED HEALTH CARE EDUCATION/TRAINING PROGRAM

## 2024-02-22 PROCEDURE — 1123F ACP DISCUSS/DSCN MKR DOCD: CPT | Performed by: STUDENT IN AN ORGANIZED HEALTH CARE EDUCATION/TRAINING PROGRAM

## 2024-02-22 PROCEDURE — 99215 OFFICE O/P EST HI 40 MIN: CPT | Performed by: STUDENT IN AN ORGANIZED HEALTH CARE EDUCATION/TRAINING PROGRAM

## 2024-02-22 PROCEDURE — 3077F SYST BP >= 140 MM HG: CPT | Performed by: STUDENT IN AN ORGANIZED HEALTH CARE EDUCATION/TRAINING PROGRAM

## 2024-02-22 PROCEDURE — 1159F MED LIST DOCD IN RCRD: CPT | Performed by: STUDENT IN AN ORGANIZED HEALTH CARE EDUCATION/TRAINING PROGRAM

## 2024-02-22 PROCEDURE — 1036F TOBACCO NON-USER: CPT | Performed by: STUDENT IN AN ORGANIZED HEALTH CARE EDUCATION/TRAINING PROGRAM

## 2024-02-22 PROCEDURE — 1160F RVW MEDS BY RX/DR IN RCRD: CPT | Performed by: STUDENT IN AN ORGANIZED HEALTH CARE EDUCATION/TRAINING PROGRAM

## 2024-02-22 ASSESSMENT — PAIN SCALES - GENERAL: PAINLEVEL: 0-NO PAIN

## 2024-02-22 ASSESSMENT — ENCOUNTER SYMPTOMS
DEPRESSION: 0
LOSS OF SENSATION IN FEET: 0
OCCASIONAL FEELINGS OF UNSTEADINESS: 0

## 2024-02-22 NOTE — PROGRESS NOTES
Subjective     Chief Complaint: Headache    Sherrie Chan is a 68 y.o. year old female who presents with chief complaint of headaches.    Sherrie started getting headaches in her late 40s around menopause. Was having 15/30 HA days per month. Currently having 30/30 HA days per month since hospitalization in 2023. The headaches are usually  achy  and are located temple and occipital, generally unilateral. The patient rates her most severe headaches a 6 in intensity. Generally, headaches last about 8 hours in duration. Denies nausea, photophobia, and phonophobia. Headaches are worsened with exertion. Triggers include stress.    No clear aura    Was admitted to [place] on 2023 for acute confusional state and elevated BP at 185/81. Was noted to have UTI as well. 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 Was recommended to see NSGY as an outpatient and neurology PRN.      Current Acute Headache Treatment Ibuprofen (relieves but does not resolve)   Current Preventative Headache Treatment None   Previous Acute Headache Treatment  None   Previous Preventative Headache Treatment None       ROS: As per HPI, otherwise all other systems have been reviewed are negative for complaint.     Past Medical History:   Diagnosis Date    COVID-19 2021    COVID-19    Inflammatory polyarthropathy (CMS/HCC) 2021    Inflammatory polyarthritis    Personal history of other diseases of the respiratory system     History of asthma    Personal history of other mental and behavioral disorders     History of anxiety disorder     Past Surgical History:   Procedure Laterality Date     SECTION, CLASSIC  2017     Section    CT HEAD ANGIO W AND WO IV CONTRAST  2023    CT HEAD ANGIO W AND WO IV CONTRAST 2023 [facility] CT    MR HEAD ANGIO WO IV CONTRAST  2023    MR HEAD ANGIO WO IV CONTRAST 2023 [facility]    MR NECK ANGIO WO IV CONTRAST   "11/8/2023    MR NECK ANGIO WO IV CONTRAST 11/8/2023 [facility]    OTHER SURGICAL HISTORY  10/01/2019    Hysterectomy    OTHER SURGICAL HISTORY  10/01/2019    Tubal ligation     Family History   Problem Relation Name Age of Onset    Hypertension Mother      Other (cardiac disorder) Father      COPD Father      Coronary artery disease Sister      Migraines Neg Hx       Social History     Tobacco Use    Smoking status: Never    Smokeless tobacco: Never   Substance Use Topics    Alcohol use: Not Currently        Objective   /76   Pulse 76   Resp 16   Ht 1.626 m (5' 4\")   Wt 77.1 kg (170 lb)   BMI 29.18 kg/m²     Neuro Exam:  Cardiac Exam: No apparent edema of b/l lower extremities  Neurological Exam:  MENTAL STATUS:   General Appearance: No distress, alert, interactive, and cooperative. Orientation was normal to time, place and person. Recent and remote memory was intact.     OPHTHALMOSCOPIC:   The ophthalmoscopic exam was normal. The fundi were well visualized with normal disc margins, clear vessels and vascular pulsations. No disc edema. The cup/disk ratio was not enlarged. No hemorrhages or exudates were present in the posterior segments that were visualized.     CRANIAL NERVES:   CN 2         Visual fields full to confrontation.   CN 3, 4, 6   Pupils round, 4 mm in diameter, equally reactive to light. Lids symmetric; no ptosis. EOMs normal alignment, full range with normal saccades, pursuit and convergence.   No nystagmus.   CN 5   Facial sensation intact bilaterally.   CN 7   Normal and symmetric facial strength. Nasolabial folds symmetric.   CN 8   Hearing intact to conversation and finger rub.  CN 9, 10   Palate elevates symmetrically.  CN 11   Normal strength of shoulder shrug and neck turning.   CN 12   Tongue midline, with normal bulk and strength; no fasciculations.     MOTOR:   Muscle bulk and tone were normal in both upper and lower extremities.   No pronator drift bilaterally.  No fasciculations, " tremor or other abnormal movements evident with the patient examined clothed.    STRENGTH:  R  L  Deltoid            5          5  Biceps  5 5  Triceps  5 5    Hip flexion 5 5  Knee Flex 5 5  Knee Ex 5 5    REFLEXES: R L  Biceps  2 2                     Triceps  2 2  Patellar  2 2     SENSORY:   In both upper and lower extremities, sensation was intact to light touch.    COORDINATION:   In both upper extremities, finger-nose-finger was intact without dysmetria or overshoot.     GAIT:   Station was stable with a normal base. Gait was stable with a normal arm swing and speed. No ataxia, shuffling, steppage or waddling was present. No circumduction was present. No Romberg sign was present.    Assessment/Plan   Patient with probable chronic migraine without aura. Recent worsening of headaches likely due changes in blood pressure and UTIs. Per our discussion, will not start any headache treatments at this time, however if we were to consider a preventive, would consider propranolol vs candesartan as this would help with blood pressure control as well. Of note, it is unlikely that migraine contributed to delirious state at time of hospitalization.     MRI head and MRA head personally reviewed and found to have pineal cyst and small incidental ICA outpouching. Unlikely to be cause of headaches nor acute confusional states. Will refer to NSGY for ongoing surveillance/management. Reassuring that patient has normal neurologic exam today.     - referral to NSGY  - follow up PRN     I personally spent 67 minutes today, exclusive of procedures, providing care for this patient, including preparation, face to face time, documentation and other services such as review of medical records, diagnostic result, patient education, counseling, coordination of care as specified in the encounter.

## 2024-02-23 ENCOUNTER — APPOINTMENT (OUTPATIENT)
Dept: PHYSICAL THERAPY | Facility: CLINIC | Age: 69
End: 2024-02-23
Payer: MEDICARE

## 2024-02-23 ENCOUNTER — PHARMACY VISIT (OUTPATIENT)
Dept: PHARMACY | Facility: CLINIC | Age: 69
End: 2024-02-23
Payer: COMMERCIAL

## 2024-02-27 ENCOUNTER — OFFICE VISIT (OUTPATIENT)
Dept: NEUROSURGERY | Facility: CLINIC | Age: 69
End: 2024-02-27
Payer: MEDICARE

## 2024-02-27 VITALS
SYSTOLIC BLOOD PRESSURE: 138 MMHG | WEIGHT: 172 LBS | DIASTOLIC BLOOD PRESSURE: 73 MMHG | HEART RATE: 90 BPM | TEMPERATURE: 98 F | HEIGHT: 64 IN | BODY MASS INDEX: 29.37 KG/M2

## 2024-02-27 DIAGNOSIS — I67.1 CEREBRAL ANEURYSM (HHS-HCC): Primary | ICD-10-CM

## 2024-02-27 DIAGNOSIS — I67.1 INTERNAL CAROTID ANEURYSM (HHS-HCC): ICD-10-CM

## 2024-02-27 DIAGNOSIS — Z51.89 ENCOUNTER FOR OTHER SPECIFIED AFTERCARE: ICD-10-CM

## 2024-02-27 PROCEDURE — 3078F DIAST BP <80 MM HG: CPT | Performed by: NEUROLOGICAL SURGERY

## 2024-02-27 PROCEDURE — 1160F RVW MEDS BY RX/DR IN RCRD: CPT | Performed by: NEUROLOGICAL SURGERY

## 2024-02-27 PROCEDURE — 99215 OFFICE O/P EST HI 40 MIN: CPT | Performed by: NEUROLOGICAL SURGERY

## 2024-02-27 PROCEDURE — 1159F MED LIST DOCD IN RCRD: CPT | Performed by: NEUROLOGICAL SURGERY

## 2024-02-27 PROCEDURE — 1126F AMNT PAIN NOTED NONE PRSNT: CPT | Performed by: NEUROLOGICAL SURGERY

## 2024-02-27 PROCEDURE — 3075F SYST BP GE 130 - 139MM HG: CPT | Performed by: NEUROLOGICAL SURGERY

## 2024-02-27 PROCEDURE — 1036F TOBACCO NON-USER: CPT | Performed by: NEUROLOGICAL SURGERY

## 2024-02-27 PROCEDURE — 1123F ACP DISCUSS/DSCN MKR DOCD: CPT | Performed by: NEUROLOGICAL SURGERY

## 2024-02-27 PROCEDURE — 99205 OFFICE O/P NEW HI 60 MIN: CPT | Performed by: NEUROLOGICAL SURGERY

## 2024-02-27 RX ORDER — ASPIRIN 81 MG/1
81 TABLET ORAL DAILY
COMMUNITY

## 2024-02-27 ASSESSMENT — PAIN SCALES - GENERAL: PAINLEVEL: 0-NO PAIN

## 2024-02-28 ENCOUNTER — SPECIALTY PHARMACY (OUTPATIENT)
Dept: PHARMACY | Facility: CLINIC | Age: 69
End: 2024-02-28

## 2024-02-28 NOTE — PROGRESS NOTES
[facility] Clinical Note    Sherrie Chan is a 68 y.o. female, who is on the specialty pharmacy service of: [facility].  Sherrie Chan is taking: Repatha Sureclick 140mg under the skin every 2 weeks.     Sherrie was contacted on 2/28/2024. She denies any side effects, adherence barriers, or efficacy concerns at this time. She admitted she originally was only taking Repatha every 3 weeks due to concern for cost, but since having health scares, has since been on track with prescribed dosing of every 2 weeks. Her copay has been $0.    Refer to the encounter summary report for documentation details about patient counseling and education.      Impression/Plan  Is patient high risk? Yes- geriatric with PMH significant for HLD (baseline LDLs >200), HTN, TIA  Is laboratory follow-up needed? Upon provider follow-up  Is a clinical intervention needed?  Not at this time  Next assessment date?  Approx. 08/28/24  Additional comments:    Medication Adherence  The importance of adherence was discussed with the patient and they were advised to take the medication as prescribed by their provider. Sherrie was encouraged to call her physician's office if they have a question regarding a missed dose.     Conclusion  Rate your quality of life on scale of 1-10: 7  Rate your satisfaction with  Specialty Pharmacy on scale of 1-10: 10 - Completely satisfied      Patient advised to contact the pharmacy if there are any changes to her medication list, including prescriptions, OTC medications, herbal products, or supplements. Patient was advised of [facility]’s dispensing process, refill timeline, contact information ([phone]), and patient management follow up. Patient confirmed understanding of education conducted during assessment. All patient questions and concerns were addressed to the best of my ability. Patient was encouraged to contact the specialty pharmacy with  any questions or concerns.    Confirmed follow-up outreaches are properly scheduled. Reviewed goals of therapy in the program targets.    Marilu Stiles, HusseinD

## 2024-03-05 NOTE — PROGRESS NOTES
Chief Complaint:   Sherrie Chan is a 68 y.o. woman here for possible cerebral aneurysm     HPI  She had episode of confusion/amnesia in 2023 and a stroke workup was done which shows a possible cerebral aneurysm as described in the assessment and plan section.  She is referred to me for this reason.  This episode of confusion resolved and she was found to have a urinary tract infection.  She has not had such episodes since then.  She has no significant headaches nor other neurologic complaints at this time.  She has family history of abdominal aortic aneurysm but no cerebral aneurysms that she knows of.     Review of Systems  All other systems reviewed and negative other than what is already stated in this note.      Past Medical History:   Diagnosis Date    COVID-19 2021    COVID-19    Inflammatory polyarthropathy (CMS/HCC) 2021    Inflammatory polyarthritis    Personal history of other diseases of the respiratory system     History of asthma    Personal history of other mental and behavioral disorders     History of anxiety disorder       Patient Active Problem List   Diagnosis    Anxiety and depression    Chronic kidney disease, stage 3a (CMS/[facility])    Current moderate episode of major depressive disorder (CMS/HCC)    Depression    Hyperglycemia    Hyperlipidemia    Hyperproteinemia    Hypertension    Overactive bladder    Pelvic pain    PMR (polymyalgia rheumatica) (CMS/HCC)    Postmenopausal osteoporosis    Recurrent urinary tract infection    Seasonal asthma    Vitamin D deficiency    Pelvic floor dysfunction    Aneurysm (CMS/[facility])       Past Surgical History:   Procedure Laterality Date     SECTION, CLASSIC  2017     Section    CT HEAD ANGIO W AND WO IV CONTRAST  2023    CT HEAD ANGIO W AND WO IV CONTRAST 2023 [facility]    MR HEAD ANGIO WO IV CONTRAST  2023    MR HEAD ANGIO WO IV CONTRAST 2023 [facility]    MR NECK ANGIO WO IV CONTRAST  2023     MR NECK ANGIO WO IV CONTRAST 11/8/2023 [facility]    OTHER SURGICAL HISTORY  10/01/2019    Hysterectomy    OTHER SURGICAL HISTORY  10/01/2019    Tubal ligation       Family History   Problem Relation Name Age of Onset    Hypertension Mother      Other (cardiac disorder) Father      COPD Father      Coronary artery disease Sister      Migraines Neg Hx         Social History     Tobacco Use    Smoking status: Never    Smokeless tobacco: Never   Vaping Use    Vaping Use: Never used   Substance Use Topics    Alcohol use: Not Currently    Drug use: Not Currently         Current Outpatient Medications:     ALPRAZolam (Xanax) 0.5 mg tablet, TAKE 1 TABLET BY MOUTH THREE TIMES DAILY AS NEEDED FOR ANXIETY FOR 7 DAYS, Disp: 30 tablet, Rfl: 0    amLODIPine (Norvasc) 10 mg tablet, TAKE 1 TABLET BY MOUTH ONCE DAILY, Disp: 90 tablet, Rfl: 3    aspirin 81 mg EC tablet, Take 1 tablet (81 mg) by mouth once daily., Disp: , Rfl:     cholecalciferol, vitamin D3, 25 mcg (1,000 unit) tablet,chewable, Chew 1 tablet (25 mcg) once daily at bedtime., Disp: , Rfl:     citalopram (CeleXA) 10 mg tablet, Take 1 tablet (10 mg) by mouth once daily., Disp: 30 tablet, Rfl: 5    cod liver oiL oil, Take 2 capsules by mouth once daily in the evening. Take with meals., Disp: , Rfl:     evolocumab (Repatha SureClick) 140 mg/mL injection, INJECT 140MG (1 PEN) UNDER THE SKIN ONCE EVERY 2 WEEKS., Disp: 2 mL, Rfl: 11    hydroCHLOROthiazide (HYDRODiuril) 25 mg tablet, TAKE 1 TABLET BY MOUTH ONCE DAILY, Disp: 90 tablet, Rfl: 3    lactobacillus acidophilus (Florajen Acidophilus) capsule, Take 1 capsule by mouth once daily. In the afternoon, Disp: , Rfl:     lisinopril 10 mg tablet, Take 2 tablets (20 mg) by mouth 2 times a day., Disp: 360 tablet, Rfl: 3    multivitamin tablet, Take 1 tablet by mouth once daily in the evening. Take with meals., Disp: , Rfl:         Objective   Vitals:    02/27/24 1255   BP: 138/73   Pulse: 90   Temp: 36.7 °C (98 °F)       no acute  distress, well developed woman appearing her  stated age  normal sclera  moist mucus membranes  no peripheral edema   symmetric chest rise  nondistended abdomen  alert and oriented, pupils equal and round, extraocular movements intact, full strength in all extremities, normal sensation to light touch throughout, normal symmetric reflexes, no dysmetria  normal mood      Assessment/Plan   I personally reviewed MRI of the head done on 11/8/2023 and CT angiogram of the head done on 11/11/2023, which shows a 3 mm outpouching of the communicating segment of the left internal carotid artery which may be a P-comm aneurysm or infundibulum.  There is also a's small 1 mm outpouching of the communicating segment of the right internal carotid artery as well.  I explained the difference between cerebral aneurysms and infundibuli.  I went over the natural history of aneurysm and risk including rupture-aneurysmal subarachnoid hemorrhage.  In order to fully delineate the difference between the two, we would need to do a cerebral angiogram.  I went over the angiogram gram procedure and its risks including small risk of stroke, vessel injury, hematoma.        Noe Combs MD

## 2024-03-13 ENCOUNTER — SPECIALTY PHARMACY (OUTPATIENT)
Dept: PHARMACY | Facility: CLINIC | Age: 69
End: 2024-03-13

## 2024-03-14 ENCOUNTER — LAB (OUTPATIENT)
Dept: LAB | Facility: LAB | Age: 69
End: 2024-03-14
Payer: MEDICARE

## 2024-03-14 ENCOUNTER — OFFICE VISIT (OUTPATIENT)
Dept: PRIMARY CARE | Facility: CLINIC | Age: 69
End: 2024-03-14
Payer: MEDICARE

## 2024-03-14 VITALS
RESPIRATION RATE: 16 BRPM | HEART RATE: 72 BPM | SYSTOLIC BLOOD PRESSURE: 136 MMHG | TEMPERATURE: 97.8 F | BODY MASS INDEX: 30.22 KG/M2 | WEIGHT: 177 LBS | DIASTOLIC BLOOD PRESSURE: 78 MMHG | OXYGEN SATURATION: 100 % | HEIGHT: 64 IN

## 2024-03-14 DIAGNOSIS — I10 PRIMARY HYPERTENSION: ICD-10-CM

## 2024-03-14 DIAGNOSIS — N39.0 RECURRENT URINARY TRACT INFECTION: Primary | ICD-10-CM

## 2024-03-14 DIAGNOSIS — I67.1 INTERNAL CAROTID ANEURYSM (HHS-HCC): ICD-10-CM

## 2024-03-14 DIAGNOSIS — I67.1 CEREBRAL ANEURYSM (HHS-HCC): ICD-10-CM

## 2024-03-14 DIAGNOSIS — R51.9 ACUTE NONINTRACTABLE HEADACHE, UNSPECIFIED HEADACHE TYPE: ICD-10-CM

## 2024-03-14 DIAGNOSIS — I72.9 ANEURYSM (CMS-HCC): ICD-10-CM

## 2024-03-14 DIAGNOSIS — F32.A ANXIETY AND DEPRESSION: ICD-10-CM

## 2024-03-14 DIAGNOSIS — F41.9 ANXIETY AND DEPRESSION: ICD-10-CM

## 2024-03-14 DIAGNOSIS — Z51.89 ENCOUNTER FOR OTHER SPECIFIED AFTERCARE: ICD-10-CM

## 2024-03-14 LAB
ANION GAP SERPL CALC-SCNC: 13 MMOL/L (ref 10–20)
APPEARANCE UR: CLEAR
BILIRUB UR STRIP.AUTO-MCNC: NEGATIVE MG/DL
BUN SERPL-MCNC: 20 MG/DL (ref 6–23)
CALCIUM SERPL-MCNC: 10 MG/DL (ref 8.6–10.3)
CHLORIDE SERPL-SCNC: 96 MMOL/L (ref 98–107)
CO2 SERPL-SCNC: 31 MMOL/L (ref 21–32)
COLOR UR: ABNORMAL
CREAT SERPL-MCNC: 0.89 MG/DL (ref 0.5–1.05)
EGFRCR SERPLBLD CKD-EPI 2021: 70 ML/MIN/1.73M*2
ERYTHROCYTE [DISTWIDTH] IN BLOOD BY AUTOMATED COUNT: 14.8 % (ref 11.5–14.5)
GLUCOSE SERPL-MCNC: 119 MG/DL (ref 74–99)
GLUCOSE UR STRIP.AUTO-MCNC: NEGATIVE MG/DL
HCT VFR BLD AUTO: 42.3 % (ref 36–46)
HGB BLD-MCNC: 13.7 G/DL (ref 12–16)
INR PPP: 1 (ref 0.9–1.1)
KETONES UR STRIP.AUTO-MCNC: NEGATIVE MG/DL
LEUKOCYTE ESTERASE UR QL STRIP.AUTO: NEGATIVE
MCH RBC QN AUTO: 27.8 PG (ref 26–34)
MCHC RBC AUTO-ENTMCNC: 32.4 G/DL (ref 32–36)
MCV RBC AUTO: 86 FL (ref 80–100)
NITRITE UR QL STRIP.AUTO: NEGATIVE
NRBC BLD-RTO: 0 /100 WBCS (ref 0–0)
PH UR STRIP.AUTO: 6 [PH]
PLATELET # BLD AUTO: 244 X10*3/UL (ref 150–450)
POTASSIUM SERPL-SCNC: 4.4 MMOL/L (ref 3.5–5.3)
PROT UR STRIP.AUTO-MCNC: NEGATIVE MG/DL
PROTHROMBIN TIME: 10.9 SECONDS (ref 9.8–12.8)
RBC # BLD AUTO: 4.93 X10*6/UL (ref 4–5.2)
RBC # UR STRIP.AUTO: ABNORMAL /UL
RBC #/AREA URNS AUTO: NORMAL /HPF
SODIUM SERPL-SCNC: 136 MMOL/L (ref 136–145)
SP GR UR STRIP.AUTO: 1.01
UROBILINOGEN UR STRIP.AUTO-MCNC: <2 MG/DL
WBC # BLD AUTO: 5 X10*3/UL (ref 4.4–11.3)
WBC #/AREA URNS AUTO: NORMAL /HPF

## 2024-03-14 PROCEDURE — 1160F RVW MEDS BY RX/DR IN RCRD: CPT | Performed by: INTERNAL MEDICINE

## 2024-03-14 PROCEDURE — 36415 COLL VENOUS BLD VENIPUNCTURE: CPT

## 2024-03-14 PROCEDURE — 3078F DIAST BP <80 MM HG: CPT | Performed by: INTERNAL MEDICINE

## 2024-03-14 PROCEDURE — 99214 OFFICE O/P EST MOD 30 MIN: CPT | Performed by: INTERNAL MEDICINE

## 2024-03-14 PROCEDURE — 1036F TOBACCO NON-USER: CPT | Performed by: INTERNAL MEDICINE

## 2024-03-14 PROCEDURE — 1159F MED LIST DOCD IN RCRD: CPT | Performed by: INTERNAL MEDICINE

## 2024-03-14 PROCEDURE — 80048 BASIC METABOLIC PNL TOTAL CA: CPT

## 2024-03-14 PROCEDURE — 87086 URINE CULTURE/COLONY COUNT: CPT

## 2024-03-14 PROCEDURE — 81001 URINALYSIS AUTO W/SCOPE: CPT

## 2024-03-14 PROCEDURE — 85027 COMPLETE CBC AUTOMATED: CPT

## 2024-03-14 PROCEDURE — 1123F ACP DISCUSS/DSCN MKR DOCD: CPT | Performed by: INTERNAL MEDICINE

## 2024-03-14 PROCEDURE — 3075F SYST BP GE 130 - 139MM HG: CPT | Performed by: INTERNAL MEDICINE

## 2024-03-14 PROCEDURE — 85610 PROTHROMBIN TIME: CPT

## 2024-03-14 RX ORDER — AMOXICILLIN AND CLAVULANATE POTASSIUM 875; 125 MG/1; MG/1
875 TABLET, FILM COATED ORAL 2 TIMES DAILY
COMMUNITY
End: 2024-03-14 | Stop reason: SDUPTHER

## 2024-03-14 RX ORDER — AMOXICILLIN AND CLAVULANATE POTASSIUM 875; 125 MG/1; MG/1
875 TABLET, FILM COATED ORAL 2 TIMES DAILY
Qty: 28 TABLET | Refills: 0 | Status: SHIPPED | OUTPATIENT
Start: 2024-03-14 | End: 2024-05-08 | Stop reason: SDUPTHER

## 2024-03-14 RX ORDER — LISINOPRIL 10 MG/1
TABLET ORAL
Qty: 180 TABLET | Refills: 3 | Status: SHIPPED | OUTPATIENT
Start: 2024-03-14

## 2024-03-14 ASSESSMENT — PATIENT HEALTH QUESTIONNAIRE - PHQ9
1. LITTLE INTEREST OR PLEASURE IN DOING THINGS: NOT AT ALL
2. FEELING DOWN, DEPRESSED OR HOPELESS: NOT AT ALL
SUM OF ALL RESPONSES TO PHQ9 QUESTIONS 1 AND 2: 0

## 2024-03-14 NOTE — PROGRESS NOTES
"Subjective   Sherrie Chan is a 68 y.o. female who presents for Hypertension follow up.    HPI   Monitoring BP daily.  Voices improvement over past 3 weeks.  Increases w/stress/Anxiety.  Taking 1/4 - 1/2 Xanax every night.    Ave: 120-130/70-80.  Denies adverse sx's r/t HTN.    C/o UTI sx's since Monday.  \"Wooziness\", back pain, R side lower abdominal discomfort.  Taking leftover Augmentin since Tuesday.  Denies Hematuria.    Saw Neurologist, Neurosurgeon last month.  Scheduled for cerebral angiogram.    Review of Systems   Constitutional:  Negative for fatigue and fever.   Respiratory:  Negative for cough and shortness of breath.    Cardiovascular:  Negative for chest pain and leg swelling.   All other systems reviewed and are negative.      Health Maintenance Due   Topic Date Due    Hepatitis A Vaccines (1 of 2 - Risk 2-dose series) Never done    Zoster Vaccines (1 of 2) Never done    Hepatitis B Vaccines (1 of 3 - Risk 3-dose series) Never done    DTaP/Tdap/Td Vaccines (2 - Td or Tdap) 01/27/2019    Pneumococcal Vaccine: 65+ Years (2 - PPSV23 or PCV20) 05/16/2020    COVID-19 Vaccine (4 - 2023-24 season) 09/01/2023    Mammogram  09/08/2023       Objective   /78   Pulse 72   Temp 36.6 °C (97.8 °F)   Resp 16   Ht 1.626 m (5' 4\")   Wt 80.3 kg (177 lb)   SpO2 100%   BMI 30.38 kg/m²     Physical Exam  Vitals and nursing note reviewed.   Constitutional:       Appearance: Normal appearance.   HENT:      Head: Normocephalic.   Eyes:      Conjunctiva/sclera: Conjunctivae normal.      Pupils: Pupils are equal, round, and reactive to light.   Cardiovascular:      Rate and Rhythm: Normal rate and regular rhythm.      Pulses: Normal pulses.      Heart sounds: Normal heart sounds.   Pulmonary:      Effort: Pulmonary effort is normal.      Breath sounds: Normal breath sounds.   Musculoskeletal:         General: No swelling.      Cervical back: Neck supple.   Skin:     General: Skin is warm and dry. "   Neurological:      General: No focal deficit present.      Mental Status: She is oriented to person, place, and time.         Assessment/Plan   Problem List Items Addressed This Visit       Anxiety and depression    Hypertension    Relevant Medications    lisinopril 10 mg tablet    Recurrent urinary tract infection - Primary    Relevant Medications    amoxicillin-pot clavulanate (Augmentin) 875-125 mg tablet    Other Relevant Orders    Urinalysis with Reflex Microscopic (Completed)    Urine Culture    Microscopic Only, Urine (Completed)    Aneurysm (CMS/[facility])     Other Visit Diagnoses       Acute nonintractable headache, unspecified headache type            [facility] records  Cont abx   Check urine  Cont meds  Stable based on symptoms and exam.  Continue established treatment plan and follow up at least yearly.

## 2024-03-15 LAB — BACTERIA UR CULT: NO GROWTH

## 2024-03-15 ASSESSMENT — ENCOUNTER SYMPTOMS
FATIGUE: 0
SHORTNESS OF BREATH: 0
FEVER: 0
COUGH: 0

## 2024-03-25 ENCOUNTER — HOSPITAL ENCOUNTER (OUTPATIENT)
Dept: RADIOLOGY | Facility: HOSPITAL | Age: 69
Discharge: HOME | End: 2024-03-25
Payer: MEDICARE

## 2024-03-25 VITALS
RESPIRATION RATE: 18 BRPM | OXYGEN SATURATION: 98 % | HEART RATE: 70 BPM | TEMPERATURE: 97.9 F | SYSTOLIC BLOOD PRESSURE: 128 MMHG | DIASTOLIC BLOOD PRESSURE: 69 MMHG

## 2024-03-25 DIAGNOSIS — I67.1 CEREBRAL ANEURYSM (HHS-HCC): ICD-10-CM

## 2024-03-25 PROCEDURE — 75710 ARTERY X-RAYS ARM/LEG: CPT | Mod: RT | Performed by: NEUROLOGICAL SURGERY

## 2024-03-25 PROCEDURE — 2550000001 HC RX 255 CONTRASTS: Performed by: NEUROLOGICAL SURGERY

## 2024-03-25 PROCEDURE — 36226 PLACE CATH VERTEBRAL ART: CPT

## 2024-03-25 PROCEDURE — C1769 GUIDE WIRE: HCPCS | Performed by: NEUROLOGICAL SURGERY

## 2024-03-25 PROCEDURE — 99153 MOD SED SAME PHYS/QHP EA: CPT | Performed by: NEUROLOGICAL SURGERY

## 2024-03-25 PROCEDURE — 36224 PLACE CATH CAROTD ART: CPT | Performed by: NEUROLOGICAL SURGERY

## 2024-03-25 PROCEDURE — C1760 CLOSURE DEV, VASC: HCPCS | Performed by: NEUROLOGICAL SURGERY

## 2024-03-25 PROCEDURE — 36225 PLACE CATH SUBCLAVIAN ART: CPT | Performed by: NEUROLOGICAL SURGERY

## 2024-03-25 PROCEDURE — 2780000003 HC OR 278 NO HCPCS: Performed by: NEUROLOGICAL SURGERY

## 2024-03-25 PROCEDURE — 2500000004 HC RX 250 GENERAL PHARMACY W/ HCPCS (ALT 636 FOR OP/ED): Performed by: NEUROLOGICAL SURGERY

## 2024-03-25 PROCEDURE — 99152 MOD SED SAME PHYS/QHP 5/>YRS: CPT | Performed by: NEUROLOGICAL SURGERY

## 2024-03-25 PROCEDURE — 2720000007 HC OR 272 NO HCPCS: Performed by: NEUROLOGICAL SURGERY

## 2024-03-25 PROCEDURE — 76377 3D RENDER W/INTRP POSTPROCES: CPT | Performed by: NEUROLOGICAL SURGERY

## 2024-03-25 PROCEDURE — 36226 PLACE CATH VERTEBRAL ART: CPT | Performed by: NEUROLOGICAL SURGERY

## 2024-03-25 RX ORDER — MIDAZOLAM HYDROCHLORIDE 1 MG/ML
INJECTION INTRAMUSCULAR; INTRAVENOUS
Status: COMPLETED | OUTPATIENT
Start: 2024-03-25 | End: 2024-03-25

## 2024-03-25 RX ORDER — FENTANYL CITRATE 50 UG/ML
INJECTION, SOLUTION INTRAMUSCULAR; INTRAVENOUS
Status: COMPLETED | OUTPATIENT
Start: 2024-03-25 | End: 2024-03-25

## 2024-03-25 RX ADMIN — IOHEXOL 125 ML: 350 INJECTION, SOLUTION INTRAVENOUS at 09:52

## 2024-03-25 RX ADMIN — MIDAZOLAM HYDROCHLORIDE 1 MG: 1 INJECTION, SOLUTION INTRAMUSCULAR; INTRAVENOUS at 09:55

## 2024-03-25 RX ADMIN — IOHEXOL 100 ML: 350 INJECTION, SOLUTION INTRAVENOUS at 09:54

## 2024-03-25 RX ADMIN — FENTANYL CITRATE 50 MCG: 50 INJECTION, SOLUTION INTRAMUSCULAR; INTRAVENOUS at 09:55

## 2024-03-25 ASSESSMENT — PAIN SCALES - GENERAL
PAINLEVEL_OUTOF10: 0 - NO PAIN

## 2024-03-25 ASSESSMENT — PAIN - FUNCTIONAL ASSESSMENT
PAIN_FUNCTIONAL_ASSESSMENT: 0-10

## 2024-03-25 NOTE — PROCEDURES
Pre-Procedure Verification and Time Out:  Procedure Location: procedure area  HUDDLE - Pre-procedure Verification:  completed  TIME OUT - Final Verification:  completed immediately prior to procedure start  DEBRIEF: completed    Complications:  None; patient tolerated the procedure well.     Disposition: rPCU  Condition: stable  Specimens Collected: No specimens collected    General Information:   Anesthesia/ sedation: Non-Anesthesia  Indication(s)/Pre - Procedure Diagnoses: concern for L pcomm aneurysm vs infundibulum   Post-Procedure Diagnosis: L pcomm infundibulum   Procedure Name: Diagnostic Cerebral Angiogram  Procedure performed by: Garret  Assistant(s): Pankaj  Estimated Blood Loss (mL): 15  Specimen: no  Informed Consent: consent obtained and in chart    Access: 5 Fr Sheath in R CFA  Closure: Mynx  Vessels Injected: L ICA +3d, R ICA, R vert, L vert, R CFA  Moderate Sedation Time: 60 mins  Findings: L pcomm infundibulum, no evidence of cerebral aneurysm. Full report to follow in PACS dictation

## 2024-03-25 NOTE — PRE-PROCEDURE NOTE
Pre-Procedure H&P     Provider Assessment:  Diagnosis/Reason for Procedure: concern for L Pcomm aneurysm vs infundibulum  Procedure: Diagnostic Cerebral Angiogram  Medications Reviewed:   yes   Prophylatic Antibiotics Needed:   no    Neuro status: A&Ox3, moving all extremities full strength   Mouth Opening OK: yes   Neck Flexibility OK: yes   Sedation Plan: moderate sedation   COVID-19 Risk Consent:  Surgeon has reviewed key risks related to the risk of bj COVID-19 and if they contract COVID-19 what the risks are.

## 2024-03-25 NOTE — DISCHARGE INSTRUCTIONS
You received moderate sedation:  - Do not drive a car, or operate any machinery or power tools of any kind.  - Do not drink any alcoholic drinks.  - Do not take any over the counter medications that may cause drowsiness.  - Do not make any important decisions or sign any legal documents.  - You need to have a responsible adult accompany you home.  - You may resume your normal diet.  - We strongly suggest that a responsible adult be with you for the rest of the day and also during the night. This is for your protection and safety.     For questions related to your procedure:  Please call [phone] between the hours of 7:00am-5:00pm Monday through Friday.  Please call [phone] after 5:00pm and on weekends and holidays.     In the event of an emergency call 911 or go to your nearest emergency room.

## 2024-04-02 ENCOUNTER — DOCUMENTATION (OUTPATIENT)
Dept: PHYSICAL THERAPY | Facility: CLINIC | Age: 69
End: 2024-04-02
Payer: MEDICARE

## 2024-04-02 NOTE — PROGRESS NOTES
Physical Therapy    Discharge Summary    Name: Sherrie Chan  MRN: [id number]  : 1955  Date: 24    Discharge Summary: PT    Discharge Information: Date of last visit 23, Date of evaluation 23, Number of attended visits 2, Referred by Dr. Benitez, and Referred for Pelvic and Perineal Pain.    Rehab Discharge Reason: Other Patient had to cancel her last scheduled appointment.  She has not contacted the clinic to return.  DC at this time due to length of time.

## 2024-04-10 PROCEDURE — RXMED WILLOW AMBULATORY MEDICATION CHARGE

## 2024-04-11 ENCOUNTER — PHARMACY VISIT (OUTPATIENT)
Dept: PHARMACY | Facility: CLINIC | Age: 69
End: 2024-04-11
Payer: COMMERCIAL

## 2024-04-25 DIAGNOSIS — F41.9 ANXIETY: ICD-10-CM

## 2024-04-25 RX ORDER — ALPRAZOLAM 0.5 MG/1
TABLET ORAL
Qty: 21 TABLET | Refills: 0 | Status: SHIPPED | OUTPATIENT
Start: 2024-04-25 | End: 2024-05-07

## 2024-05-06 ENCOUNTER — SPECIALTY PHARMACY (OUTPATIENT)
Dept: PHARMACY | Facility: CLINIC | Age: 69
End: 2024-05-06

## 2024-05-07 DIAGNOSIS — F41.9 ANXIETY: ICD-10-CM

## 2024-05-07 RX ORDER — ALPRAZOLAM 0.5 MG/1
TABLET ORAL
Qty: 30 TABLET | Refills: 0 | Status: SHIPPED | OUTPATIENT
Start: 2024-05-07

## 2024-05-08 ENCOUNTER — OFFICE VISIT (OUTPATIENT)
Dept: PRIMARY CARE | Facility: CLINIC | Age: 69
End: 2024-05-08
Payer: MEDICARE

## 2024-05-08 VITALS
SYSTOLIC BLOOD PRESSURE: 128 MMHG | HEART RATE: 80 BPM | DIASTOLIC BLOOD PRESSURE: 78 MMHG | HEIGHT: 64 IN | RESPIRATION RATE: 16 BRPM | TEMPERATURE: 97.8 F | WEIGHT: 179 LBS | OXYGEN SATURATION: 100 % | BODY MASS INDEX: 30.56 KG/M2

## 2024-05-08 DIAGNOSIS — F41.9 ANXIETY AND DEPRESSION: ICD-10-CM

## 2024-05-08 DIAGNOSIS — F32.A ANXIETY AND DEPRESSION: ICD-10-CM

## 2024-05-08 DIAGNOSIS — N39.0 RECURRENT URINARY TRACT INFECTION: ICD-10-CM

## 2024-05-08 DIAGNOSIS — I10 ESSENTIAL (PRIMARY) HYPERTENSION: ICD-10-CM

## 2024-05-08 DIAGNOSIS — I10 PRIMARY HYPERTENSION: Primary | ICD-10-CM

## 2024-05-08 DIAGNOSIS — R10.2 PELVIC PAIN: ICD-10-CM

## 2024-05-08 LAB
APPEARANCE UR: CLEAR
BILIRUB UR STRIP.AUTO-MCNC: NEGATIVE MG/DL
COLOR UR: YELLOW
GLUCOSE UR STRIP.AUTO-MCNC: NEGATIVE MG/DL
KETONES UR STRIP.AUTO-MCNC: NEGATIVE MG/DL
LEUKOCYTE ESTERASE UR QL STRIP.AUTO: NEGATIVE
NITRITE UR QL STRIP.AUTO: NEGATIVE
PH UR STRIP.AUTO: 5 [PH]
POC APPEARANCE, URINE: CLEAR
POC BILIRUBIN, URINE: NEGATIVE
POC BLOOD, URINE: ABNORMAL
POC COLOR, URINE: YELLOW
POC GLUCOSE, URINE: NEGATIVE MG/DL
POC KETONES, URINE: NEGATIVE MG/DL
POC LEUKOCYTES, URINE: NEGATIVE
POC NITRITE,URINE: NEGATIVE
POC PH, URINE: 5 PH
POC PROTEIN, URINE: NEGATIVE MG/DL
POC SPECIFIC GRAVITY, URINE: 1.02
POC UROBILINOGEN, URINE: 0.2 EU/DL
PROT UR STRIP.AUTO-MCNC: NEGATIVE MG/DL
RBC # UR STRIP.AUTO: ABNORMAL /UL
RBC #/AREA URNS AUTO: NORMAL /HPF
SP GR UR STRIP.AUTO: 1.01
UROBILINOGEN UR STRIP.AUTO-MCNC: <2 MG/DL
WBC #/AREA URNS AUTO: NORMAL /HPF

## 2024-05-08 PROCEDURE — 87086 URINE CULTURE/COLONY COUNT: CPT

## 2024-05-08 PROCEDURE — 1159F MED LIST DOCD IN RCRD: CPT | Performed by: INTERNAL MEDICINE

## 2024-05-08 PROCEDURE — 3078F DIAST BP <80 MM HG: CPT | Performed by: INTERNAL MEDICINE

## 2024-05-08 PROCEDURE — 1123F ACP DISCUSS/DSCN MKR DOCD: CPT | Performed by: INTERNAL MEDICINE

## 2024-05-08 PROCEDURE — 81001 URINALYSIS AUTO W/SCOPE: CPT

## 2024-05-08 PROCEDURE — 99214 OFFICE O/P EST MOD 30 MIN: CPT | Performed by: INTERNAL MEDICINE

## 2024-05-08 PROCEDURE — 3074F SYST BP LT 130 MM HG: CPT | Performed by: INTERNAL MEDICINE

## 2024-05-08 PROCEDURE — 1160F RVW MEDS BY RX/DR IN RCRD: CPT | Performed by: INTERNAL MEDICINE

## 2024-05-08 PROCEDURE — 81002 URINALYSIS NONAUTO W/O SCOPE: CPT | Performed by: INTERNAL MEDICINE

## 2024-05-08 PROCEDURE — 1036F TOBACCO NON-USER: CPT | Performed by: INTERNAL MEDICINE

## 2024-05-08 RX ORDER — AMOXICILLIN AND CLAVULANATE POTASSIUM 875; 125 MG/1; MG/1
875 TABLET, FILM COATED ORAL 2 TIMES DAILY
Qty: 28 TABLET | Refills: 0 | Status: SHIPPED | OUTPATIENT
Start: 2024-05-08 | End: 2024-05-08

## 2024-05-08 RX ORDER — DOXYCYCLINE 100 MG/1
100 CAPSULE ORAL 2 TIMES DAILY
Qty: 14 CAPSULE | Refills: 0 | Status: SHIPPED | OUTPATIENT
Start: 2024-05-08 | End: 2024-05-15

## 2024-05-08 RX ORDER — HYDROCHLOROTHIAZIDE 25 MG/1
25 TABLET ORAL DAILY
Qty: 90 TABLET | Refills: 0 | Status: SHIPPED | OUTPATIENT
Start: 2024-05-08

## 2024-05-08 RX ORDER — AMOXICILLIN AND CLAVULANATE POTASSIUM 875; 125 MG/1; MG/1
1 TABLET, FILM COATED ORAL 2 TIMES DAILY
Qty: 28 TABLET | Refills: 0 | Status: SHIPPED | OUTPATIENT
Start: 2024-05-08

## 2024-05-08 RX ORDER — DOXYCYCLINE 100 MG/1
100 CAPSULE ORAL 2 TIMES DAILY
Qty: 14 CAPSULE | Refills: 0 | Status: SHIPPED | OUTPATIENT
Start: 2024-05-08 | End: 2024-05-08

## 2024-05-08 RX ORDER — AMLODIPINE BESYLATE 10 MG/1
10 TABLET ORAL DAILY
Qty: 90 TABLET | Refills: 0 | Status: SHIPPED | OUTPATIENT
Start: 2024-05-08

## 2024-05-08 ASSESSMENT — ENCOUNTER SYMPTOMS
SHORTNESS OF BREATH: 0
FATIGUE: 0
COUGH: 0
FEVER: 0

## 2024-05-08 NOTE — PROGRESS NOTES
Subjective   Sherrie Chan is a 69 y.o. female who presents for Hypertension and Anxiety follow up.    HPI   Pt reports stopping 1/2 tab Lisinopril after recent CT scan d/t BP improving.  Restarted 1/2 tab Lisinopril this past weekend d/t elevated /80.  C/o UTI sx's started a 2-3 weeks ago.  Took x4 days of Augmentin, held x2 days, restarted x5 days.  Completed on Friday.  C/o R side back pain, R sided pelvic pain.  Denies Hematuria.  Denies adverse sx's r/t HTN.  Headaches have improved.    OARRS:  Eufemia Ascencio,  on 5/8/2024 10:51 AM  I have personally reviewed the OARRS report for Sherrie Chan. I have considered the risks of abuse, dependence, addiction and diversion    Is the patient prescribed a combination of a benzodiazepine and opioid?  No    Last Urine Drug Screen / ordered today: No  Recent Results (from the past 8760 hour(s))   Confirmation Opiate/Opioid/Benzo Prescription Compliance    Collection Time: 02/12/24  4:43 PM   Result Value Ref Range    Clonazepam <25 <25 ng/mL    7-Aminoclonazepam <25 <25 ng/mL    Alprazolam <25 <25 ng/mL    Alpha-Hydroxyalprazolam <25 <25 ng/mL    Midazolam <25 <25 ng/mL    Alpha-Hydroxymidazolam <25 <25 ng/mL    Chlordiazepoxide <25 <25 ng/mL    Diazepam <25 <25 ng/mL    Nordiazepam <25 <25 ng/mL    Temazepam <25 <25 ng/mL    Oxazepam <25 <25 ng/mL    Lorazepam <25 <25 ng/mL    Methadone <25 <25 ng/mL    EDDP <25 <25 ng/mL    6-Acetylmorphine <25 <25 ng/mL    Codeine <50 <50 ng/mL    Hydrocodone <25 <25 ng/mL    Hydromorphone <25 <25 ng/mL    Morphine  <50 <50 ng/mL    Norhydrocodone <25 <25 ng/mL    Noroxycodone <25 <25 ng/mL    Oxycodone <25 <25 ng/mL    Oxymorphone <25 <25 ng/mL    Fentanyl <2.5 <2.5 ng/mL    Norfentanyl <2.5 <2.5 ng/mL    Tramadol <50 <50 ng/mL    O-Desmethyltramadol <50 <50 ng/mL    Zolpidem <25 <25 ng/mL    Zolpidem Metabolite (ZCA) <25 <25 ng/mL   Screen Opiate/Opioid/Benzo Prescription Compliance    Collection Time: 02/12/24  4:43  PM   Result Value Ref Range    Creatinine, Urine Random 19.3 (L) 20.0 - 320.0 mg/dL    Amphetamine Screen, Urine Presumptive Negative Presumptive Negative    Barbiturate Screen, Urine Presumptive Negative Presumptive Negative    Cannabinoid Screen, Urine Presumptive Negative Presumptive Negative    Cocaine Metabolite Screen, Urine Presumptive Negative Presumptive Negative    PCP Screen, Urine Presumptive Negative Presumptive Negative     Results are as expected.         Controlled Substance Agreement:  Date of the Last Agreement: 2/12/2024  Reviewed Controlled Substance Agreement including but not limited to the benefits, risks, and alternatives to treatment with a Controlled Substance medication(s).    Benzodiazepines:  What is the patient's goal of therapy? Anxiety relief  Is this being achieved with current treatment? Yes    ODILIA-7:  No data recorded    Activities of Daily Living:   Is your overall impression that this patient is benefiting (symptom reduction outweighs side effects) from benzodiazepine therapy? Yes     1. Physical Functioning: Better  2. Family Relationship: Better  3. Social Relationship: Better  4. Mood: Better  5. Sleep Patterns: Better  6. Overall Function: Better  Review of Systems   Constitutional:  Negative for fatigue and fever.   Respiratory:  Negative for cough and shortness of breath.    Cardiovascular:  Negative for chest pain and leg swelling.   All other systems reviewed and are negative.      Health Maintenance Due   Topic Date Due    Hepatitis A Vaccines (1 of 2 - Risk 2-dose series) Never done    Zoster Vaccines (1 of 2) Never done    RSV Pregnant patients and/or  patients aged 60+ years (1 - 1-dose 60+ series) Never done    Hepatitis B Vaccines (1 of 3 - Risk 3-dose series) Never done    DTaP/Tdap/Td Vaccines (2 - Td or Tdap) 01/27/2019    Pneumococcal Vaccine: 65+ Years (2 of 2 - PPSV23 or PCV20) 07/11/2019    COVID-19 Vaccine (4 - 2023-24 season) 09/01/2023    Mammogram   "09/08/2023       Objective   /78   Pulse 80   Temp 36.6 °C (97.8 °F)   Resp 16   Ht 1.626 m (5' 4\")   Wt 81.2 kg (179 lb)   SpO2 100%   BMI 30.73 kg/m²     Physical Exam  Vitals and nursing note reviewed.   Constitutional:       Appearance: Normal appearance.   HENT:      Head: Normocephalic.   Eyes:      Conjunctiva/sclera: Conjunctivae normal.      Pupils: Pupils are equal, round, and reactive to light.   Cardiovascular:      Rate and Rhythm: Normal rate and regular rhythm.      Pulses: Normal pulses.      Heart sounds: Normal heart sounds.   Pulmonary:      Effort: Pulmonary effort is normal.      Breath sounds: Normal breath sounds.   Musculoskeletal:         General: No swelling.      Cervical back: Neck supple.   Skin:     General: Skin is warm and dry.   Neurological:      General: No focal deficit present.      Mental Status: She is oriented to person, place, and time.         Assessment/Plan   Problem List Items Addressed This Visit       Anxiety and depression    Hypertension - Primary    Pelvic pain    Recurrent urinary tract infection    Relevant Medications    amoxicillin-pot clavulanate (Augmentin) 875-125 mg tablet    doxycycline (Vibramycin) 100 mg capsule    Other Relevant Orders    POCT UA (nonautomated) manually resulted (Completed)    Urinalysis with Reflex Microscopic    Urine Culture     Check urine   Doxy  Stable based on symptoms and exam.  Continue established treatment plan and follow up at least yearly.  I have personally reviewed patients OARRS report.  This report is scanned into the emr.  I have considered the risks of abuse, dependence, addiction and diversion.         "

## 2024-05-09 LAB — BACTERIA UR CULT: NORMAL

## 2024-05-14 ENCOUNTER — TELEPHONE (OUTPATIENT)
Dept: PRIMARY CARE | Facility: CLINIC | Age: 69
End: 2024-05-14
Payer: MEDICARE

## 2024-05-14 NOTE — TELEPHONE ENCOUNTER
Pt called w/update.  Within 3 days of adding doxycycline, UTI sx's improved.  Questioned if med should be Rx'd as maintenance med.    Further reports intermittent numbbess with both Augmentin and Doxycycline.  Waxes and wanes. Reports h/o Carpal tunnel.  Exacerbates w/ATB's.  Is tolerable.

## 2024-05-22 ENCOUNTER — TELEPHONE (OUTPATIENT)
Dept: PRIMARY CARE | Facility: CLINIC | Age: 69
End: 2024-05-22
Payer: MEDICARE

## 2024-05-22 DIAGNOSIS — Z12.31 ENCOUNTER FOR SCREENING MAMMOGRAM FOR MALIGNANT NEOPLASM OF BREAST: Primary | ICD-10-CM

## 2024-05-22 NOTE — TELEPHONE ENCOUNTER
Pt left  requesting Mamm order.  Current order is .  Further reports she has had some breast pain since Covid.  Has h/o clip L side.  Was told by Mammography d/t complaints order could be changed to diagnostic if you agree.    Pt sent [employer] message that cut off.

## 2024-05-23 DIAGNOSIS — N64.4 BREAST PAIN: Primary | ICD-10-CM

## 2024-05-28 ENCOUNTER — APPOINTMENT (OUTPATIENT)
Dept: PRIMARY CARE | Facility: CLINIC | Age: 69
End: 2024-05-28
Payer: MEDICARE

## 2024-05-31 ENCOUNTER — SPECIALTY PHARMACY (OUTPATIENT)
Dept: PHARMACY | Facility: CLINIC | Age: 69
End: 2024-05-31

## 2024-06-03 ENCOUNTER — SPECIALTY PHARMACY (OUTPATIENT)
Dept: PHARMACY | Facility: CLINIC | Age: 69
End: 2024-06-03

## 2024-06-03 PROCEDURE — RXMED WILLOW AMBULATORY MEDICATION CHARGE

## 2024-06-04 ENCOUNTER — PHARMACY VISIT (OUTPATIENT)
Dept: PHARMACY | Facility: CLINIC | Age: 69
End: 2024-06-04
Payer: COMMERCIAL

## 2024-06-14 ENCOUNTER — APPOINTMENT (OUTPATIENT)
Dept: PRIMARY CARE | Facility: CLINIC | Age: 69
End: 2024-06-14
Payer: MEDICARE

## 2024-06-21 ENCOUNTER — HOSPITAL ENCOUNTER (OUTPATIENT)
Dept: RADIOLOGY | Facility: HOSPITAL | Age: 69
Discharge: HOME | End: 2024-06-21
Payer: MEDICARE

## 2024-06-21 ENCOUNTER — APPOINTMENT (OUTPATIENT)
Dept: RADIOLOGY | Facility: HOSPITAL | Age: 69
End: 2024-06-21
Payer: MEDICARE

## 2024-06-21 VITALS — WEIGHT: 175 LBS | HEIGHT: 64 IN | BODY MASS INDEX: 29.88 KG/M2

## 2024-06-21 DIAGNOSIS — N64.4 BREAST PAIN: ICD-10-CM

## 2024-06-21 PROCEDURE — 77062 BREAST TOMOSYNTHESIS BI: CPT

## 2024-06-26 DIAGNOSIS — F41.9 ANXIETY AND DEPRESSION: ICD-10-CM

## 2024-06-26 DIAGNOSIS — F32.A ANXIETY AND DEPRESSION: ICD-10-CM

## 2024-06-26 RX ORDER — CITALOPRAM 10 MG/1
10 TABLET ORAL DAILY
Qty: 30 TABLET | Refills: 5 | Status: SHIPPED | OUTPATIENT
Start: 2024-06-26

## 2024-06-27 ENCOUNTER — SPECIALTY PHARMACY (OUTPATIENT)
Dept: PHARMACY | Facility: CLINIC | Age: 69
End: 2024-06-27

## 2024-06-27 RX ORDER — EVOLOCUMAB 140 MG/ML
140 INJECTION, SOLUTION SUBCUTANEOUS
Qty: 2 ML | Refills: 11 | OUTPATIENT
Start: 2024-06-27 | End: 2025-06-27

## 2024-07-01 RX ORDER — EVOLOCUMAB 140 MG/ML
140 INJECTION, SOLUTION SUBCUTANEOUS
Qty: 2 ML | Refills: 11 | OUTPATIENT
Start: 2024-07-01 | End: 2025-07-01

## 2024-07-01 NOTE — TELEPHONE ENCOUNTER
Message sent to patient to call to schedule an appointment as she has not been seen by Dr. Lee since Nov 2022 thus we cannot refill RX.

## 2024-07-09 ENCOUNTER — OFFICE VISIT (OUTPATIENT)
Dept: CARDIOLOGY | Facility: CLINIC | Age: 69
End: 2024-07-09
Payer: MEDICARE

## 2024-07-09 ENCOUNTER — PHARMACY VISIT (OUTPATIENT)
Dept: PHARMACY | Facility: CLINIC | Age: 69
End: 2024-07-09
Payer: COMMERCIAL

## 2024-07-09 VITALS
BODY MASS INDEX: 30.56 KG/M2 | SYSTOLIC BLOOD PRESSURE: 132 MMHG | HEART RATE: 73 BPM | OXYGEN SATURATION: 96 % | HEIGHT: 64 IN | WEIGHT: 179 LBS | DIASTOLIC BLOOD PRESSURE: 74 MMHG

## 2024-07-09 DIAGNOSIS — G45.9 TIA (TRANSIENT ISCHEMIC ATTACK): ICD-10-CM

## 2024-07-09 DIAGNOSIS — I10 PRIMARY HYPERTENSION: ICD-10-CM

## 2024-07-09 DIAGNOSIS — E78.2 MIXED HYPERLIPIDEMIA: Primary | ICD-10-CM

## 2024-07-09 PROCEDURE — 93005 ELECTROCARDIOGRAM TRACING: CPT | Performed by: INTERNAL MEDICINE

## 2024-07-09 PROCEDURE — 1160F RVW MEDS BY RX/DR IN RCRD: CPT | Performed by: INTERNAL MEDICINE

## 2024-07-09 PROCEDURE — 99214 OFFICE O/P EST MOD 30 MIN: CPT | Performed by: INTERNAL MEDICINE

## 2024-07-09 PROCEDURE — 1123F ACP DISCUSS/DSCN MKR DOCD: CPT | Performed by: INTERNAL MEDICINE

## 2024-07-09 PROCEDURE — RXMED WILLOW AMBULATORY MEDICATION CHARGE

## 2024-07-09 PROCEDURE — 3075F SYST BP GE 130 - 139MM HG: CPT | Performed by: INTERNAL MEDICINE

## 2024-07-09 PROCEDURE — 93010 ELECTROCARDIOGRAM REPORT: CPT | Performed by: INTERNAL MEDICINE

## 2024-07-09 PROCEDURE — 1159F MED LIST DOCD IN RCRD: CPT | Performed by: INTERNAL MEDICINE

## 2024-07-09 PROCEDURE — 3078F DIAST BP <80 MM HG: CPT | Performed by: INTERNAL MEDICINE

## 2024-07-09 PROCEDURE — 1126F AMNT PAIN NOTED NONE PRSNT: CPT | Performed by: INTERNAL MEDICINE

## 2024-07-09 RX ORDER — EVOLOCUMAB 140 MG/ML
140 INJECTION, SOLUTION SUBCUTANEOUS
Qty: 2 ML | Refills: 11 | OUTPATIENT
Start: 2024-07-09 | End: 2025-07-09

## 2024-07-09 ASSESSMENT — COLUMBIA-SUICIDE SEVERITY RATING SCALE - C-SSRS
6. HAVE YOU EVER DONE ANYTHING, STARTED TO DO ANYTHING, OR PREPARED TO DO ANYTHING TO END YOUR LIFE?: NO
1. IN THE PAST MONTH, HAVE YOU WISHED YOU WERE DEAD OR WISHED YOU COULD GO TO SLEEP AND NOT WAKE UP?: NO
2. HAVE YOU ACTUALLY HAD ANY THOUGHTS OF KILLING YOURSELF?: NO

## 2024-07-09 ASSESSMENT — PAIN SCALES - GENERAL: PAINLEVEL: 0-NO PAIN

## 2024-07-09 NOTE — PROGRESS NOTES
CHIEF COMPLAINT: routine follow-up visit    HISTORY OF PRESENT ILLNESS:    PCP: Dr. Eufemia Ascencio     Ms. Chan is a 68 yo F with hx of DLD (likely HeFH as highest ) with high TG and fatty liver disease, HTN, and PMR who returns to Cardiology Clinic for advanced lipid management; last seen by me in . She was admitted in 2023 at [facility] with another UTI and possible TIA. 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 She admittedly was not taking repatha prior to hospitalization, but has been consistent since. Denies CP, SOB, dizziness, LH, LE edema, or palpitations. Intolerant to rosuvastatin, atorvastatin, and simvastatin (myalgias and cramping). Dad with heart issues.      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 (2023); negative cerebral angiogram  -anxiety/depression  -asthma  -frequent UTIs  -     PAST CARDIAC TESTING:  -TTE (2023): EF normal, bubble study negative   -CAC (): 0  -Cath ( done for false positive stress test): no CAD     Allergies   Allergen Reactions    Nitrofurantoin Monohyd/M-Cryst Diarrhea and Dizziness    Statins-Hmg-Coa Reductase Inhibitors Myalgia    Sulfamethoxazole Rash     Current Outpatient Medications:     amLODIPine (Norvasc) 10 mg tablet, TAKE 1 TABLET BY MOUTH ONCE DAILY, Disp: 90 tablet, Rfl: 0    aspirin 81 mg EC tablet, Take 1 tablet (81 mg) by mouth once daily., Disp: , Rfl:     cholecalciferol, vitamin D3, 25 mcg (1,000 unit) tablet,chewable, Chew 1 tablet (25 mcg) once daily at bedtime., Disp: , Rfl:     citalopram (CeleXA) 10 mg tablet, TAKE 1 TABLET BY MOUTH ONCE DAILY, Disp: 30 tablet, Rfl: 5    cod liver oiL oil, Take 2 capsules by mouth once daily  "in the evening. Take with meals., Disp: , Rfl:     hydroCHLOROthiazide (HYDRODiuril) 25 mg tablet, TAKE 1 TABLET BY MOUTH ONCE DAILY, Disp: 90 tablet, Rfl: 0    lactobacillus acidophilus (Florajen Acidophilus) capsule, Take 1 capsule by mouth once daily. In the afternoon, Disp: , Rfl:     lisinopril 10 mg tablet, Take 0.5 tab by mouth QAM and 1 tab QPM., Disp: 180 tablet, Rfl: 3    multivitamin tablet, Take 1 tablet by mouth once daily in the evening. Take with meals., Disp: , Rfl:     evolocumab (Repatha SureClick) 140 mg/mL injection, INJECT 140MG (1 PEN) UNDER THE SKIN ONCE EVERY 2 WEEKS., Disp: 2 mL, Rfl: 11    /74 (BP Location: Left arm, Patient Position: Sitting, BP Cuff Size: Adult)   Pulse 73   Ht 1.626 m (5' 4\")   Wt 81.2 kg (179 lb)   SpO2 96%   BMI 30.73 kg/m²     PHYSICAL EXAM:  GENERAL: NAD  HEENT: no JVD  CV: RRR without m/r/g  PULM: CTAB  EXT: non-edematous bilateral lower extremities     LABS  WBC (x10*3/uL)   Date Value   03/14/2024 5.0     Hemoglobin (g/dL)   Date Value   03/14/2024 13.7     Platelets (x10*3/uL)   Date Value   03/14/2024 244     Sodium (mmol/L)   Date Value   03/14/2024 136     Potassium (mmol/L)   Date Value   03/14/2024 4.4     Chloride (mmol/L)   Date Value   03/14/2024 96 (L)     Bicarbonate (mmol/L)   Date Value   03/14/2024 31     Urea Nitrogen (mg/dL)   Date Value   03/14/2024 20     Creatinine (mg/dL)   Date Value   03/14/2024 0.89     Calcium (mg/dL)   Date Value   03/14/2024 10.0     Total Protein (g/dL)   Date Value   02/13/2024 7.0     Bilirubin, Total (mg/dL)   Date Value   02/13/2024 0.3     Alkaline Phosphatase (U/L)   Date Value   02/13/2024 48     ALT (U/L)   Date Value   02/13/2024 13     AST (U/L)   Date Value   02/13/2024 15     Glucose (mg/dL)   Date Value   03/14/2024 119 (H)     Cholesterol (mg/dL)   Date Value   11/02/2023 285 (H)   03/31/2023 342 (H)   10/12/2022 277 (H)   07/21/2022 291 (H)     LDL Calculated (mg/dL)   Date Value   11/02/2023 " 161 (H)     HDL-Cholesterol (mg/dL)   Date Value   11/02/2023 69.8     HDL (mg/dL)   Date Value   03/31/2023 74.5   10/12/2022 70.0   07/21/2022 68.0     Triglycerides (mg/dL)   Date Value   11/02/2023 269 (H)   03/31/2023 268 (H)   10/12/2022 306 (H)   07/21/2022 316 (H)     Hemoglobin A1C (%)   Date Value   11/08/2023 5.6   12/02/2021 5.9 (A)   10/12/2021 5.8 (A)   10/01/2019 6.2     Lab Results   Component Value Date    LDLF 214 (H) 03/31/2023     ASSESSMENT/PLAN: 68 yo F with hx of DLD (likely HeFH as highest ) with high TG and statin intolerance, TIA, HTN, and PMR for advanced lipid management.  and  in November prior to taking repatha consistently. Get updated FLP and CMP. If cholesterol remains high for secondary prevention goal, consider nexletol/nexlizet addition. BP controlled on amlodipine 10 mg daily, HCTZ 25 mg daily, and lisinopril 10 mg daily. On ASA 81 mg daily. RTC 1 year; call with bloodwork results.

## 2024-07-10 LAB
ATRIAL RATE: 71 BPM
P AXIS: 67 DEGREES
P OFFSET: 189 MS
P ONSET: 130 MS
PR INTERVAL: 186 MS
Q ONSET: 223 MS
QRS COUNT: 12 BEATS
QRS DURATION: 74 MS
QT INTERVAL: 388 MS
QTC CALCULATION(BAZETT): 421 MS
QTC FREDERICIA: 410 MS
R AXIS: 53 DEGREES
T AXIS: 48 DEGREES
T OFFSET: 417 MS
VENTRICULAR RATE: 71 BPM

## 2024-07-16 ENCOUNTER — LAB (OUTPATIENT)
Dept: LAB | Facility: LAB | Age: 69
End: 2024-07-16
Payer: MEDICARE

## 2024-07-16 DIAGNOSIS — E78.2 MIXED HYPERLIPIDEMIA: ICD-10-CM

## 2024-07-16 LAB
ALBUMIN SERPL BCP-MCNC: 4.5 G/DL (ref 3.4–5)
ALP SERPL-CCNC: 44 U/L (ref 33–136)
ALT SERPL W P-5'-P-CCNC: 14 U/L (ref 7–45)
ANION GAP SERPL CALC-SCNC: 13 MMOL/L (ref 10–20)
AST SERPL W P-5'-P-CCNC: 16 U/L (ref 9–39)
BILIRUB SERPL-MCNC: 0.4 MG/DL (ref 0–1.2)
BUN SERPL-MCNC: 23 MG/DL (ref 6–23)
CALCIUM SERPL-MCNC: 9.8 MG/DL (ref 8.6–10.3)
CHLORIDE SERPL-SCNC: 99 MMOL/L (ref 98–107)
CHOLEST SERPL-MCNC: 276 MG/DL (ref 0–199)
CHOLESTEROL/HDL RATIO: 4
CO2 SERPL-SCNC: 30 MMOL/L (ref 21–32)
CREAT SERPL-MCNC: 0.84 MG/DL (ref 0.5–1.05)
EGFRCR SERPLBLD CKD-EPI 2021: 74 ML/MIN/1.73M*2
GLUCOSE SERPL-MCNC: 108 MG/DL (ref 74–99)
HDLC SERPL-MCNC: 69.7 MG/DL
LDLC SERPL CALC-MCNC: 151 MG/DL
NON HDL CHOLESTEROL: 206 MG/DL (ref 0–149)
POTASSIUM SERPL-SCNC: 4.2 MMOL/L (ref 3.5–5.3)
PROT SERPL-MCNC: 7.3 G/DL (ref 6.4–8.2)
SODIUM SERPL-SCNC: 138 MMOL/L (ref 136–145)
TRIGL SERPL-MCNC: 276 MG/DL (ref 0–149)
VLDL: 55 MG/DL (ref 0–40)

## 2024-07-16 PROCEDURE — 36415 COLL VENOUS BLD VENIPUNCTURE: CPT

## 2024-07-16 PROCEDURE — 80061 LIPID PANEL: CPT

## 2024-07-16 PROCEDURE — 80053 COMPREHEN METABOLIC PANEL: CPT

## 2024-07-17 DIAGNOSIS — E78.2 MIXED HYPERLIPIDEMIA: Primary | ICD-10-CM

## 2024-07-17 RX ORDER — BEMPEDOIC ACID AND EZETIMIBE 180; 10 MG/1; MG/1
1 TABLET, FILM COATED ORAL DAILY
Qty: 90 TABLET | Refills: 3 | Status: SHIPPED | OUTPATIENT
Start: 2024-07-17 | End: 2025-07-17

## 2024-07-17 NOTE — PROGRESS NOTES
TELEPHONE NOTE:    Called and spoke with patient regarding bloodwork results.  and  still above secondary prevention goal given TIA. She is intolerant to statins. Will start nexlizet 1 pill daily and repeat FLP in 3 months.

## 2024-07-31 ENCOUNTER — SPECIALTY PHARMACY (OUTPATIENT)
Dept: PHARMACY | Facility: CLINIC | Age: 69
End: 2024-07-31

## 2024-07-31 PROCEDURE — RXMED WILLOW AMBULATORY MEDICATION CHARGE

## 2024-08-01 DIAGNOSIS — F41.9 ANXIETY: ICD-10-CM

## 2024-08-01 RX ORDER — ALPRAZOLAM 0.5 MG/1
TABLET ORAL
Qty: 30 TABLET | Refills: 0 | OUTPATIENT
Start: 2024-08-01

## 2024-08-05 ENCOUNTER — PHARMACY VISIT (OUTPATIENT)
Dept: PHARMACY | Facility: CLINIC | Age: 69
End: 2024-08-05
Payer: COMMERCIAL

## 2024-08-06 ENCOUNTER — OFFICE VISIT (OUTPATIENT)
Dept: PRIMARY CARE | Facility: CLINIC | Age: 69
End: 2024-08-06
Payer: MEDICARE

## 2024-08-06 VITALS
TEMPERATURE: 98 F | OXYGEN SATURATION: 100 % | HEIGHT: 64 IN | HEART RATE: 76 BPM | WEIGHT: 176 LBS | DIASTOLIC BLOOD PRESSURE: 72 MMHG | RESPIRATION RATE: 16 BRPM | BODY MASS INDEX: 30.05 KG/M2 | SYSTOLIC BLOOD PRESSURE: 128 MMHG

## 2024-08-06 DIAGNOSIS — N39.0 RECURRENT URINARY TRACT INFECTION: ICD-10-CM

## 2024-08-06 LAB
POC APPEARANCE, URINE: CLEAR
POC BILIRUBIN, URINE: NEGATIVE
POC BLOOD, URINE: ABNORMAL
POC COLOR, URINE: YELLOW
POC GLUCOSE, URINE: NEGATIVE MG/DL
POC KETONES, URINE: NEGATIVE MG/DL
POC LEUKOCYTES, URINE: NEGATIVE
POC NITRITE,URINE: NEGATIVE
POC PH, URINE: 5 PH
POC PROTEIN, URINE: NEGATIVE MG/DL
POC SPECIFIC GRAVITY, URINE: 1.01
POC UROBILINOGEN, URINE: 0.2 EU/DL

## 2024-08-06 PROCEDURE — 1159F MED LIST DOCD IN RCRD: CPT | Performed by: INTERNAL MEDICINE

## 2024-08-06 PROCEDURE — 3074F SYST BP LT 130 MM HG: CPT | Performed by: INTERNAL MEDICINE

## 2024-08-06 PROCEDURE — 3078F DIAST BP <80 MM HG: CPT | Performed by: INTERNAL MEDICINE

## 2024-08-06 PROCEDURE — 81002 URINALYSIS NONAUTO W/O SCOPE: CPT | Performed by: INTERNAL MEDICINE

## 2024-08-06 PROCEDURE — 1123F ACP DISCUSS/DSCN MKR DOCD: CPT | Performed by: INTERNAL MEDICINE

## 2024-08-06 PROCEDURE — 3008F BODY MASS INDEX DOCD: CPT | Performed by: INTERNAL MEDICINE

## 2024-08-06 PROCEDURE — 87086 URINE CULTURE/COLONY COUNT: CPT

## 2024-08-06 PROCEDURE — 1160F RVW MEDS BY RX/DR IN RCRD: CPT | Performed by: INTERNAL MEDICINE

## 2024-08-06 PROCEDURE — 81001 URINALYSIS AUTO W/SCOPE: CPT

## 2024-08-06 PROCEDURE — 1036F TOBACCO NON-USER: CPT | Performed by: INTERNAL MEDICINE

## 2024-08-06 PROCEDURE — 99213 OFFICE O/P EST LOW 20 MIN: CPT | Performed by: INTERNAL MEDICINE

## 2024-08-06 RX ORDER — AMOXICILLIN AND CLAVULANATE POTASSIUM 875; 125 MG/1; MG/1
875 TABLET, FILM COATED ORAL 2 TIMES DAILY
Qty: 20 TABLET | Refills: 0 | Status: SHIPPED | OUTPATIENT
Start: 2024-08-06 | End: 2024-08-06

## 2024-08-06 RX ORDER — AMOXICILLIN AND CLAVULANATE POTASSIUM 875; 125 MG/1; MG/1
875 TABLET, FILM COATED ORAL 2 TIMES DAILY
Qty: 20 TABLET | Refills: 1 | Status: SHIPPED | OUTPATIENT
Start: 2024-08-06 | End: 2024-08-26

## 2024-08-06 ASSESSMENT — ENCOUNTER SYMPTOMS
FATIGUE: 0
SHORTNESS OF BREATH: 0
FEVER: 0
COUGH: 0

## 2024-08-06 NOTE — PROGRESS NOTES
"Subjective   Sherrie Chan is a 69 y.o. female who presents for UTI.    HPI   Pt c/o R flank pain, pelvic discomfort x2 weeks.  Headache, dizziness, fatigue.  Denies Hematuria.  Afebrile.  Tx: Augmentin, last dose 8/1/24.    Review of Systems   Constitutional:  Negative for fatigue and fever.   Respiratory:  Negative for cough and shortness of breath.    Cardiovascular:  Negative for chest pain and leg swelling.   All other systems reviewed and are negative.      Health Maintenance Due   Topic Date Due    CKD: Urine Protein Screening  Never done    Zoster Vaccines (1 of 2) Never done    RSV Pregnant patients and/or  patients aged 60+ years (1 - 1-dose 60+ series) Never done    DTaP/Tdap/Td Vaccines (2 - Td or Tdap) 01/27/2019    Pneumococcal Vaccine: 65+ Years (2 of 2 - PPSV23 or PCV20) 07/11/2019    COVID-19 Vaccine (4 - 2023-24 season) 09/01/2023    Influenza Vaccine (1) 09/01/2024    Medicare Annual Wellness Visit (AWV)  10/03/2024       Objective   /72   Pulse 76   Temp 36.7 °C (98 °F)   Resp 16   Ht 1.626 m (5' 4\")   Wt 79.8 kg (176 lb)   SpO2 100%   BMI 30.21 kg/m²     Physical Exam  Vitals and nursing note reviewed.   Constitutional:       Appearance: Normal appearance.   HENT:      Head: Normocephalic.   Eyes:      Conjunctiva/sclera: Conjunctivae normal.      Pupils: Pupils are equal, round, and reactive to light.   Cardiovascular:      Rate and Rhythm: Normal rate and regular rhythm.      Pulses: Normal pulses.      Heart sounds: Normal heart sounds.   Pulmonary:      Effort: Pulmonary effort is normal.      Breath sounds: Normal breath sounds.   Musculoskeletal:         General: No swelling.      Cervical back: Neck supple.   Skin:     General: Skin is warm and dry.   Neurological:      General: No focal deficit present.      Mental Status: She is oriented to person, place, and time.         Assessment/Plan   Problem List Items Addressed This Visit       Recurrent urinary tract infection "    Relevant Medications    amoxicillin-pot clavulanate (Augmentin) 875-125 mg tablet    Other Relevant Orders    POCT UA (nonautomated) manually resulted (Completed)    Urinalysis with Reflex Microscopic    Urine Culture     Reviewed meds  Take augmentin x 5 days   Will check cultures

## 2024-08-07 LAB
APPEARANCE UR: CLEAR
BACTERIA UR CULT: NO GROWTH
BILIRUB UR STRIP.AUTO-MCNC: NEGATIVE MG/DL
COLOR UR: ABNORMAL
GLUCOSE UR STRIP.AUTO-MCNC: NORMAL MG/DL
KETONES UR STRIP.AUTO-MCNC: NEGATIVE MG/DL
LEUKOCYTE ESTERASE UR QL STRIP.AUTO: NEGATIVE
MUCOUS THREADS #/AREA URNS AUTO: NORMAL /LPF
NITRITE UR QL STRIP.AUTO: NEGATIVE
PH UR STRIP.AUTO: 5.5 [PH]
PROT UR STRIP.AUTO-MCNC: NEGATIVE MG/DL
RBC # UR STRIP.AUTO: ABNORMAL /UL
RBC #/AREA URNS AUTO: NORMAL /HPF
SP GR UR STRIP.AUTO: 1.01
UROBILINOGEN UR STRIP.AUTO-MCNC: NORMAL MG/DL
WBC #/AREA URNS AUTO: NORMAL /HPF

## 2024-08-23 ENCOUNTER — SPECIALTY PHARMACY (OUTPATIENT)
Dept: PHARMACY | Facility: CLINIC | Age: 69
End: 2024-08-23

## 2024-08-23 PROCEDURE — RXMED WILLOW AMBULATORY MEDICATION CHARGE

## 2024-08-28 ENCOUNTER — PHARMACY VISIT (OUTPATIENT)
Dept: PHARMACY | Facility: CLINIC | Age: 69
End: 2024-08-28
Payer: COMMERCIAL

## 2024-08-29 ENCOUNTER — SPECIALTY PHARMACY (OUTPATIENT)
Dept: PHARMACY | Facility: CLINIC | Age: 69
End: 2024-08-29

## 2024-09-03 ENCOUNTER — SPECIALTY PHARMACY (OUTPATIENT)
Dept: PHARMACY | Facility: CLINIC | Age: 69
End: 2024-09-03

## 2024-09-03 NOTE — PROGRESS NOTES
[facility] Clinical Note    Sherrie Chan is a 69 y.o. female, who is on the specialty pharmacy service for management of:  Hyperlipidemia Core.    Sherrie Chan is taking: Repatha Sureclick 140mg under the skin every 2 weeks.    Sherrie was contacted on 9/3/2024 at 10:22 AM for a virtual pharmacy visit with encounter number [id number] from:   [facility]   SPECIAL[facility]  [address] [place] [id number]  Dept: [phone]  Dept Fax: [phone]    The most recent encounter visit with the referring prescriber La Lee MD on 7/9/24 was reviewed.  Pharmacy will continue to collaborate in the care of this patient with the referring prescriber La Lee MD.    General Assessment  Sherrie continues on Repatha as prescribed with no reported side effects or ongoing adherence barriers. Admits to a late dose due to faulty pen from . Reviewed med replacement process should that happen again in the future by reporting issue directly to [facility] for possible replacement of faulty supply. Her most recent lipid panel shows LDL above goal on Repatha monotherapy, but Sherrie denies fill of Nexlizet at this time. She wants to focus on lifestyle modifications with improvement in diet and exercise along with Repatha for now. Denied any questions or concerns for a pharmacist at this time regarding Repatha or discussion around Nexilzet- just hesistant to start any new med at this time.     Impression/Plan:  Is patient high risk (potential patients:  pregnancy, geriatric, pediatric)?  Yes- geriatric with likely HeFH, fatty liver disease, high TG, HTN, PMR, Hx of TIA  Is laboratory follow-up needed? As directed per provider  Is a clinical intervention needed? Not at this time- she wants to focus on lifestyle modifications with improvements in diet and exercise along with Repatha before starting Nexlizet (higher cost and hesitant to start new med)  Next  reassessment date? Approx. 6 months    Refer to the encounter summary report for documentation details about patient counseling and education.      Medication Adherence    The importance of adherence was discussed with the patient and they were advised to take the medication as prescribed by their provider. Patient was encouraged to call their physician's office if they have a question regarding a missed dose.     QOL/Patient Satisfaction  Rate your quality of life on scale of 1-10: 9  Rate your satisfaction with  Specialty Pharmacy on scale of 1-10: 10 - Completely satisfied      Patient was advised to contact the pharmacy if there are any changes to their medication list, including prescriptions, OTC medications, herbal products, or supplements. Patient was advised of [facility]'s dispensing process, refill timeline, contact information ([phone]), and patient management follow up. Patient confirmed understanding of education conducted during assessment. All patient questions and concerns were addressed to the best of my ability. Patient was encouraged to contact the specialty pharmacy with any questions or concerns.    Confirmed follow-up outreaches are properly scheduled and reviewed goals of therapy with the patient.        Marilu Stiles, PharmD

## 2024-09-10 ENCOUNTER — APPOINTMENT (OUTPATIENT)
Dept: PRIMARY CARE | Facility: CLINIC | Age: 69
End: 2024-09-10
Payer: MEDICARE

## 2024-09-10 VITALS
OXYGEN SATURATION: 98 % | HEIGHT: 64 IN | RESPIRATION RATE: 16 BRPM | TEMPERATURE: 97.6 F | WEIGHT: 176 LBS | DIASTOLIC BLOOD PRESSURE: 70 MMHG | BODY MASS INDEX: 30.05 KG/M2 | HEART RATE: 72 BPM | SYSTOLIC BLOOD PRESSURE: 126 MMHG

## 2024-09-10 DIAGNOSIS — N18.31 CHRONIC KIDNEY DISEASE, STAGE 3A (MULTI): ICD-10-CM

## 2024-09-10 DIAGNOSIS — F41.9 ANXIETY AND DEPRESSION: ICD-10-CM

## 2024-09-10 DIAGNOSIS — Z12.11 SCREENING FOR COLON CANCER: ICD-10-CM

## 2024-09-10 DIAGNOSIS — E55.9 VITAMIN D DEFICIENCY: ICD-10-CM

## 2024-09-10 DIAGNOSIS — F32.A ANXIETY AND DEPRESSION: ICD-10-CM

## 2024-09-10 DIAGNOSIS — I10 PRIMARY HYPERTENSION: Primary | ICD-10-CM

## 2024-09-10 DIAGNOSIS — J45.998 SEASONAL ASTHMA (HHS-HCC): ICD-10-CM

## 2024-09-10 DIAGNOSIS — M35.3 PMR (POLYMYALGIA RHEUMATICA) (MULTI): ICD-10-CM

## 2024-09-10 DIAGNOSIS — Z23 ENCOUNTER FOR IMMUNIZATION: ICD-10-CM

## 2024-09-10 DIAGNOSIS — N39.0 RECURRENT URINARY TRACT INFECTION: ICD-10-CM

## 2024-09-10 DIAGNOSIS — Z00.00 HEALTHCARE MAINTENANCE: ICD-10-CM

## 2024-09-10 DIAGNOSIS — E78.2 MIXED HYPERLIPIDEMIA: ICD-10-CM

## 2024-09-10 PROCEDURE — 1036F TOBACCO NON-USER: CPT | Performed by: INTERNAL MEDICINE

## 2024-09-10 PROCEDURE — G0439 PPPS, SUBSEQ VISIT: HCPCS | Performed by: INTERNAL MEDICINE

## 2024-09-10 PROCEDURE — 1160F RVW MEDS BY RX/DR IN RCRD: CPT | Performed by: INTERNAL MEDICINE

## 2024-09-10 PROCEDURE — 1159F MED LIST DOCD IN RCRD: CPT | Performed by: INTERNAL MEDICINE

## 2024-09-10 PROCEDURE — G0008 ADMIN INFLUENZA VIRUS VAC: HCPCS | Performed by: INTERNAL MEDICINE

## 2024-09-10 PROCEDURE — 3074F SYST BP LT 130 MM HG: CPT | Performed by: INTERNAL MEDICINE

## 2024-09-10 PROCEDURE — 1170F FXNL STATUS ASSESSED: CPT | Performed by: INTERNAL MEDICINE

## 2024-09-10 PROCEDURE — 3008F BODY MASS INDEX DOCD: CPT | Performed by: INTERNAL MEDICINE

## 2024-09-10 PROCEDURE — 99214 OFFICE O/P EST MOD 30 MIN: CPT | Performed by: INTERNAL MEDICINE

## 2024-09-10 PROCEDURE — 3078F DIAST BP <80 MM HG: CPT | Performed by: INTERNAL MEDICINE

## 2024-09-10 PROCEDURE — 1123F ACP DISCUSS/DSCN MKR DOCD: CPT | Performed by: INTERNAL MEDICINE

## 2024-09-10 PROCEDURE — 90662 IIV NO PRSV INCREASED AG IM: CPT | Performed by: INTERNAL MEDICINE

## 2024-09-10 ASSESSMENT — ENCOUNTER SYMPTOMS
BACK PAIN: 0
PALPITATIONS: 0
PSYCHIATRIC NEGATIVE: 1
FREQUENCY: 0
WHEEZING: 0
CONSTIPATION: 0
UNEXPECTED WEIGHT CHANGE: 0
FATIGUE: 0
COUGH: 0
ARTHRALGIAS: 0
FEVER: 0
RHINORRHEA: 0
ABDOMINAL PAIN: 0
SORE THROAT: 0
SHORTNESS OF BREATH: 0
HEADACHES: 0
EYE ITCHING: 0
EYE PAIN: 0
NAUSEA: 0
EYE REDNESS: 0
DYSURIA: 0
DIARRHEA: 0
DIFFICULTY URINATING: 0
WEAKNESS: 0

## 2024-09-10 ASSESSMENT — ACTIVITIES OF DAILY LIVING (ADL)
DOING_HOUSEWORK: INDEPENDENT
BATHING: INDEPENDENT
MANAGING_FINANCES: INDEPENDENT
TAKING_MEDICATION: INDEPENDENT
DRESSING: INDEPENDENT
GROCERY_SHOPPING: INDEPENDENT

## 2024-09-10 ASSESSMENT — PATIENT HEALTH QUESTIONNAIRE - PHQ9
SUM OF ALL RESPONSES TO PHQ9 QUESTIONS 1 AND 2: 0
2. FEELING DOWN, DEPRESSED OR HOPELESS: NOT AT ALL
1. LITTLE INTEREST OR PLEASURE IN DOING THINGS: NOT AT ALL

## 2024-09-10 NOTE — PROGRESS NOTES
"Subjective   Sherrie Chan is a 69 y.o. female who presents for Hypertension follow up.    HPI   Monitors BP occasionally.  Ave: 120/70.  Denies adverse sx's r/t HTN.  Taking meds as Rx'd.    Flu 24  Covid   Pneumo 19  Tdap 09  Shingrix due  Mammogram 6/24  Dxa 10/22  Colon cancer screen 12/21  Bmi 30  Eye exam 23  Fall risk 24  Depression screen 24  Adv dir no      Review of Systems   Constitutional:  Negative for fatigue, fever and unexpected weight change.   HENT:  Negative for congestion, ear pain, rhinorrhea and sore throat.    Eyes:  Negative for pain, redness and itching.   Respiratory:  Negative for cough, shortness of breath and wheezing.    Cardiovascular:  Negative for chest pain, palpitations and leg swelling.   Gastrointestinal:  Negative for abdominal pain, constipation, diarrhea and nausea.   Genitourinary:  Negative for difficulty urinating, dysuria and frequency.   Musculoskeletal:  Negative for arthralgias and back pain.   Allergic/Immunologic: Negative for environmental allergies, food allergies and immunocompromised state.   Neurological:  Negative for weakness and headaches.   Psychiatric/Behavioral: Negative.     All other systems reviewed and are negative.      Health Maintenance Due   Topic Date Due    CKD: Urine Protein Screening  Never done    Zoster Vaccines (1 of 2) Never done    RSV Pregnant patients and/or  patients aged 60+ years (1 - 1-dose 60+ series) Never done    DTaP/Tdap/Td Vaccines (2 - Td or Tdap) 01/27/2019    Pneumococcal Vaccine: 65+ Years (2 of 2 - PPSV23 or PCV20) 07/11/2019    COVID-19 Vaccine (4 - 2023-24 season) 09/01/2024    Medicare Annual Wellness Visit (AWV)  10/03/2024    Bone Density Scan  10/28/2024    Diabetes Screening  11/08/2024       Objective   /70   Pulse 72   Temp 36.4 °C (97.6 °F)   Resp 16   Ht 1.626 m (5' 4\")   Wt 79.8 kg (176 lb)   SpO2 98%   BMI 30.21 kg/m²     Physical Exam  Vitals and nursing note reviewed.   Constitutional:       " Appearance: Normal appearance.   HENT:      Head: Normocephalic.   Eyes:      Conjunctiva/sclera: Conjunctivae normal.      Pupils: Pupils are equal, round, and reactive to light.   Cardiovascular:      Rate and Rhythm: Normal rate and regular rhythm.      Pulses: Normal pulses.      Heart sounds: Normal heart sounds.   Pulmonary:      Effort: Pulmonary effort is normal.      Breath sounds: Normal breath sounds.   Musculoskeletal:         General: No swelling.      Cervical back: Neck supple.   Skin:     General: Skin is warm and dry.   Neurological:      General: No focal deficit present.      Mental Status: She is oriented to person, place, and time.         Assessment/Plan   Problem List Items Addressed This Visit       Anxiety and depression    Chronic kidney disease, stage 3a (Multi)    Hyperlipidemia    Relevant Orders    Lipid Panel    Thyroid Stimulating Hormone    Hypertension - Primary    Relevant Orders    CBC    Comprehensive Metabolic Panel    PMR (polymyalgia rheumatica) (Multi)    Relevant Orders    Sedimentation Rate    Recurrent urinary tract infection    Seasonal asthma (HHS-HCC)    Vitamin D deficiency    Relevant Orders    Vitamin D 25-Hydroxy,Total (for eval of Vitamin D levels)    Vitamin B12     Other Visit Diagnoses       Encounter for immunization        Relevant Orders    Flu vaccine, trivalent, preservative free, HIGH-DOSE, age 65y+ (Fluzone) (Completed)    Healthcare maintenance        Screening for colon cancer        Relevant Orders    Cologuard® colon cancer screening          Update preventive   Cont meds  Check labs  Stable based on symptoms and exam.  Continue established treatment plan and follow up at least yearly.

## 2024-09-19 PROCEDURE — RXMED WILLOW AMBULATORY MEDICATION CHARGE

## 2024-09-25 ENCOUNTER — PHARMACY VISIT (OUTPATIENT)
Dept: PHARMACY | Facility: CLINIC | Age: 69
End: 2024-09-25
Payer: COMMERCIAL

## 2024-10-03 ENCOUNTER — APPOINTMENT (OUTPATIENT)
Dept: PRIMARY CARE | Facility: CLINIC | Age: 69
End: 2024-10-03
Payer: MEDICARE

## 2024-10-10 DIAGNOSIS — N39.0 RECURRENT URINARY TRACT INFECTION: ICD-10-CM

## 2024-10-10 RX ORDER — AMOXICILLIN AND CLAVULANATE POTASSIUM 875; 125 MG/1; MG/1
1 TABLET, FILM COATED ORAL 2 TIMES DAILY
Qty: 20 TABLET | Refills: 1 | Status: SHIPPED | OUTPATIENT
Start: 2024-10-10

## 2024-10-17 ENCOUNTER — SPECIALTY PHARMACY (OUTPATIENT)
Dept: PHARMACY | Facility: CLINIC | Age: 69
End: 2024-10-17

## 2024-10-17 PROCEDURE — RXMED WILLOW AMBULATORY MEDICATION CHARGE

## 2024-10-20 DIAGNOSIS — I10 ESSENTIAL (PRIMARY) HYPERTENSION: ICD-10-CM

## 2024-10-21 DIAGNOSIS — F41.9 ANXIETY AND DEPRESSION: ICD-10-CM

## 2024-10-21 DIAGNOSIS — F32.A ANXIETY AND DEPRESSION: ICD-10-CM

## 2024-10-21 RX ORDER — AMLODIPINE BESYLATE 10 MG/1
10 TABLET ORAL DAILY
Qty: 90 TABLET | Refills: 3 | Status: SHIPPED | OUTPATIENT
Start: 2024-10-21

## 2024-10-21 RX ORDER — CITALOPRAM 10 MG/1
10 TABLET ORAL DAILY
Qty: 30 TABLET | Refills: 5 | Status: SHIPPED | OUTPATIENT
Start: 2024-10-21

## 2024-10-21 RX ORDER — HYDROCHLOROTHIAZIDE 25 MG/1
25 TABLET ORAL DAILY
Qty: 90 TABLET | Refills: 3 | Status: SHIPPED | OUTPATIENT
Start: 2024-10-21

## 2024-10-23 ENCOUNTER — PHARMACY VISIT (OUTPATIENT)
Dept: PHARMACY | Facility: CLINIC | Age: 69
End: 2024-10-23
Payer: COMMERCIAL

## 2024-11-14 ENCOUNTER — SPECIALTY PHARMACY (OUTPATIENT)
Dept: PHARMACY | Facility: CLINIC | Age: 69
End: 2024-11-14

## 2024-11-14 PROCEDURE — RXMED WILLOW AMBULATORY MEDICATION CHARGE

## 2024-11-20 ENCOUNTER — PHARMACY VISIT (OUTPATIENT)
Dept: PHARMACY | Facility: CLINIC | Age: 69
End: 2024-11-20
Payer: COMMERCIAL

## 2024-11-26 ENCOUNTER — APPOINTMENT (OUTPATIENT)
Dept: PRIMARY CARE | Facility: CLINIC | Age: 69
End: 2024-11-26
Payer: MEDICARE

## 2024-11-26 ENCOUNTER — LAB (OUTPATIENT)
Dept: LAB | Facility: LAB | Age: 69
End: 2024-11-26
Payer: MEDICARE

## 2024-11-26 VITALS
TEMPERATURE: 98.3 F | DIASTOLIC BLOOD PRESSURE: 76 MMHG | HEART RATE: 76 BPM | SYSTOLIC BLOOD PRESSURE: 132 MMHG | RESPIRATION RATE: 16 BRPM | BODY MASS INDEX: 30.39 KG/M2 | HEIGHT: 64 IN | OXYGEN SATURATION: 100 % | WEIGHT: 178 LBS

## 2024-11-26 DIAGNOSIS — N18.31 CHRONIC KIDNEY DISEASE, STAGE 3A (MULTI): ICD-10-CM

## 2024-11-26 DIAGNOSIS — M35.3 PMR (POLYMYALGIA RHEUMATICA) (MULTI): Primary | ICD-10-CM

## 2024-11-26 DIAGNOSIS — E78.2 MIXED HYPERLIPIDEMIA: ICD-10-CM

## 2024-11-26 DIAGNOSIS — M25.50 POLYARTHRALGIA: ICD-10-CM

## 2024-11-26 DIAGNOSIS — M35.3 PMR (POLYMYALGIA RHEUMATICA) (MULTI): ICD-10-CM

## 2024-11-26 DIAGNOSIS — N39.0 RECURRENT URINARY TRACT INFECTION: ICD-10-CM

## 2024-11-26 DIAGNOSIS — R10.13 EPIGASTRIC PAIN: ICD-10-CM

## 2024-11-26 DIAGNOSIS — E55.9 VITAMIN D DEFICIENCY: ICD-10-CM

## 2024-11-26 DIAGNOSIS — I10 PRIMARY HYPERTENSION: ICD-10-CM

## 2024-11-26 LAB
25(OH)D3 SERPL-MCNC: 33 NG/ML (ref 30–100)
ALBUMIN SERPL BCP-MCNC: 4.5 G/DL (ref 3.4–5)
ALP SERPL-CCNC: 58 U/L (ref 33–136)
ALT SERPL W P-5'-P-CCNC: 14 U/L (ref 7–45)
ANION GAP SERPL CALC-SCNC: 12 MMOL/L (ref 10–20)
APPEARANCE UR: CLEAR
AST SERPL W P-5'-P-CCNC: 16 U/L (ref 9–39)
BACTERIA #/AREA URNS AUTO: ABNORMAL /HPF
BILIRUB SERPL-MCNC: 0.3 MG/DL (ref 0–1.2)
BILIRUB UR STRIP.AUTO-MCNC: NEGATIVE MG/DL
BUN SERPL-MCNC: 24 MG/DL (ref 6–23)
CALCIUM SERPL-MCNC: 10.1 MG/DL (ref 8.6–10.3)
CHLORIDE SERPL-SCNC: 100 MMOL/L (ref 98–107)
CO2 SERPL-SCNC: 31 MMOL/L (ref 21–32)
COLOR UR: ABNORMAL
CREAT SERPL-MCNC: 0.9 MG/DL (ref 0.5–1.05)
CRP SERPL-MCNC: 1.88 MG/DL
EGFRCR SERPLBLD CKD-EPI 2021: 68 ML/MIN/1.73M*2
ERYTHROCYTE [DISTWIDTH] IN BLOOD BY AUTOMATED COUNT: 14.9 % (ref 11.5–14.5)
ERYTHROCYTE [SEDIMENTATION RATE] IN BLOOD BY WESTERGREN METHOD: 21 MM/H (ref 0–30)
GLUCOSE SERPL-MCNC: 116 MG/DL (ref 74–99)
GLUCOSE UR STRIP.AUTO-MCNC: NORMAL MG/DL
HCT VFR BLD AUTO: 43.5 % (ref 36–46)
HGB BLD-MCNC: 14 G/DL (ref 12–16)
KETONES UR STRIP.AUTO-MCNC: NEGATIVE MG/DL
LEUKOCYTE ESTERASE UR QL STRIP.AUTO: NEGATIVE
MCH RBC QN AUTO: 27.7 PG (ref 26–34)
MCHC RBC AUTO-ENTMCNC: 32.2 G/DL (ref 32–36)
MCV RBC AUTO: 86 FL (ref 80–100)
NITRITE UR QL STRIP.AUTO: NEGATIVE
NRBC BLD-RTO: 0 /100 WBCS (ref 0–0)
PH UR STRIP.AUTO: 5 [PH]
PLATELET # BLD AUTO: 301 X10*3/UL (ref 150–450)
POC APPEARANCE, URINE: CLEAR
POC BILIRUBIN, URINE: NEGATIVE
POC BLOOD, URINE: ABNORMAL
POC COLOR, URINE: YELLOW
POC GLUCOSE, URINE: NEGATIVE MG/DL
POC KETONES, URINE: NEGATIVE MG/DL
POC LEUKOCYTES, URINE: NEGATIVE
POC NITRITE,URINE: NEGATIVE
POC PH, URINE: 5.5 PH
POC PROTEIN, URINE: NEGATIVE MG/DL
POC SPECIFIC GRAVITY, URINE: 1.02
POC UROBILINOGEN, URINE: 0.2 EU/DL
POTASSIUM SERPL-SCNC: 4.9 MMOL/L (ref 3.5–5.3)
PROT SERPL-MCNC: 7.4 G/DL (ref 6.4–8.2)
PROT UR STRIP.AUTO-MCNC: NEGATIVE MG/DL
RBC # BLD AUTO: 5.06 X10*6/UL (ref 4–5.2)
RBC # UR STRIP.AUTO: ABNORMAL /UL
RBC #/AREA URNS AUTO: ABNORMAL /HPF
SODIUM SERPL-SCNC: 138 MMOL/L (ref 136–145)
SP GR UR STRIP.AUTO: 1.02
TSH SERPL-ACNC: 1.3 MIU/L (ref 0.44–3.98)
URATE SERPL-MCNC: 8.8 MG/DL (ref 2.3–6.7)
UROBILINOGEN UR STRIP.AUTO-MCNC: NORMAL MG/DL
VIT B12 SERPL-MCNC: 656 PG/ML (ref 211–911)
WBC # BLD AUTO: 5.8 X10*3/UL (ref 4.4–11.3)
WBC #/AREA URNS AUTO: ABNORMAL /HPF

## 2024-11-26 PROCEDURE — 1036F TOBACCO NON-USER: CPT | Performed by: INTERNAL MEDICINE

## 2024-11-26 PROCEDURE — 82306 VITAMIN D 25 HYDROXY: CPT

## 2024-11-26 PROCEDURE — 86038 ANTINUCLEAR ANTIBODIES: CPT

## 2024-11-26 PROCEDURE — 99214 OFFICE O/P EST MOD 30 MIN: CPT | Performed by: INTERNAL MEDICINE

## 2024-11-26 PROCEDURE — 1123F ACP DISCUSS/DSCN MKR DOCD: CPT | Performed by: INTERNAL MEDICINE

## 2024-11-26 PROCEDURE — 1160F RVW MEDS BY RX/DR IN RCRD: CPT | Performed by: INTERNAL MEDICINE

## 2024-11-26 PROCEDURE — 81001 URINALYSIS AUTO W/SCOPE: CPT

## 2024-11-26 PROCEDURE — 1159F MED LIST DOCD IN RCRD: CPT | Performed by: INTERNAL MEDICINE

## 2024-11-26 PROCEDURE — 85027 COMPLETE CBC AUTOMATED: CPT

## 2024-11-26 PROCEDURE — 3075F SYST BP GE 130 - 139MM HG: CPT | Performed by: INTERNAL MEDICINE

## 2024-11-26 PROCEDURE — 84443 ASSAY THYROID STIM HORMONE: CPT

## 2024-11-26 PROCEDURE — 3008F BODY MASS INDEX DOCD: CPT | Performed by: INTERNAL MEDICINE

## 2024-11-26 PROCEDURE — 86140 C-REACTIVE PROTEIN: CPT

## 2024-11-26 PROCEDURE — 85652 RBC SED RATE AUTOMATED: CPT

## 2024-11-26 PROCEDURE — 82607 VITAMIN B-12: CPT

## 2024-11-26 PROCEDURE — 87086 URINE CULTURE/COLONY COUNT: CPT

## 2024-11-26 PROCEDURE — 3078F DIAST BP <80 MM HG: CPT | Performed by: INTERNAL MEDICINE

## 2024-11-26 PROCEDURE — 81002 URINALYSIS NONAUTO W/O SCOPE: CPT | Performed by: INTERNAL MEDICINE

## 2024-11-26 PROCEDURE — 84550 ASSAY OF BLOOD/URIC ACID: CPT

## 2024-11-26 PROCEDURE — 80053 COMPREHEN METABOLIC PANEL: CPT

## 2024-11-26 PROCEDURE — 36415 COLL VENOUS BLD VENIPUNCTURE: CPT

## 2024-11-26 RX ORDER — AMOXICILLIN AND CLAVULANATE POTASSIUM 875; 125 MG/1; MG/1
1 TABLET, FILM COATED ORAL 2 TIMES DAILY
Qty: 20 TABLET | Refills: 1 | Status: CANCELLED | OUTPATIENT
Start: 2024-11-26

## 2024-11-26 RX ORDER — CEFDINIR 300 MG/1
300 CAPSULE ORAL 2 TIMES DAILY
Qty: 20 CAPSULE | Refills: 0 | Status: SHIPPED | OUTPATIENT
Start: 2024-11-26 | End: 2024-12-06

## 2024-11-26 ASSESSMENT — ENCOUNTER SYMPTOMS
COUGH: 0
FATIGUE: 0
SHORTNESS OF BREATH: 0
FEVER: 0

## 2024-11-26 NOTE — PROGRESS NOTES
"Subjective   Sherrie Chna is a 69 y.o. female who presents for recurrent UTI.    HPI   Pt reports recurrent UTI every 4-5 weeks.  Taking Augmentin w/onset of sx's.  Recent last dose 11/10/24.  Asymptomatic at this time.  Denies hematuria.  Reports epigastric discomfort w/ATB.  Took Prilosec x1 week. Effective.    C/o pain base L great toe x1 week.  Reports swelling, redness.  Stated has improved.  Tx: Ibuprofen, Aleve.  Effective.  Ice, effective.  Further reports intermittent joint pain and swelling (L shoulder, L index finger). Improved.  \"Over all sick feeling like when I had PMR\".    Review of Systems   Constitutional:  Negative for fatigue and fever.   Respiratory:  Negative for cough and shortness of breath.    Cardiovascular:  Negative for chest pain and leg swelling.   All other systems reviewed and are negative.      Health Maintenance Due   Topic Date Due    CKD: Urine Protein Screening  Never done    Zoster Vaccines (1 of 2) Never done    RSV High Risk: (Elderly (60+) or Pregnant Population) (1 - Risk 60-74 years 1-dose series) Never done    DTaP/Tdap/Td Vaccines (2 - Td or Tdap) 01/27/2019    Pneumococcal Vaccine: 65+ Years (2 of 2 - PPSV23 or PCV20) 07/11/2019    COVID-19 Vaccine (4 - 2024-25 season) 09/01/2024    Bone Density Scan  10/28/2024    Diabetes Screening  11/08/2024    Colorectal Cancer Screening  12/12/2024       Objective   /76   Pulse 76   Temp 36.8 °C (98.3 °F)   Resp 16   Ht 1.626 m (5' 4\")   Wt 80.7 kg (178 lb)   SpO2 100%   BMI 30.55 kg/m²     Physical Exam  Vitals and nursing note reviewed.   Constitutional:       Appearance: Normal appearance.   HENT:      Head: Normocephalic.   Eyes:      Conjunctiva/sclera: Conjunctivae normal.      Pupils: Pupils are equal, round, and reactive to light.   Cardiovascular:      Rate and Rhythm: Normal rate and regular rhythm.      Pulses: Normal pulses.      Heart sounds: Normal heart sounds.   Pulmonary:      Effort: Pulmonary " effort is normal.      Breath sounds: Normal breath sounds.   Musculoskeletal:         General: No swelling.      Cervical back: Neck supple.   Skin:     General: Skin is warm and dry.   Neurological:      General: No focal deficit present.      Mental Status: She is oriented to person, place, and time.         Assessment/Plan   Problem List Items Addressed This Visit       Chronic kidney disease, stage 3a (Multi)    Relevant Orders    Uric acid    PMR (polymyalgia rheumatica) (Multi) - Primary    Relevant Orders    Sedimentation Rate    Recurrent urinary tract infection    Relevant Medications    cefdinir (Omnicef) 300 mg capsule    Other Relevant Orders    POCT UA (nonautomated) manually resulted (Completed)    Urinalysis with Reflex Microscopic    Urine Culture     Other Visit Diagnoses       Polyarthralgia        Relevant Orders    Uric acid    C-reactive protein    JASON    Epigastric pain        Relevant Orders    CBC    Comprehensive Metabolic Panel          Check labs  Cont meds  Stable based on symptoms and exam.  Continue established treatment plan and follow up at least yearly.  Treat urine   Follow up if not improving

## 2024-11-27 ENCOUNTER — TELEPHONE (OUTPATIENT)
Dept: PRIMARY CARE | Facility: CLINIC | Age: 69
End: 2024-11-27
Payer: MEDICARE

## 2024-11-27 DIAGNOSIS — M10.079 ACUTE IDIOPATHIC GOUT INVOLVING TOE, UNSPECIFIED LATERALITY: Primary | ICD-10-CM

## 2024-11-27 LAB — ANA SER QL HEP2 SUBST: NEGATIVE

## 2024-11-27 RX ORDER — METHYLPREDNISOLONE 4 MG/1
TABLET ORAL
Qty: 21 TABLET | Refills: 0 | Status: SHIPPED | OUTPATIENT
Start: 2024-11-27

## 2024-11-27 NOTE — TELEPHONE ENCOUNTER
----- Message from Eufemia Ascencio sent at 11/27/2024  5:04 PM EST -----  Call patient her labs are partially back   Looks like she is at risk of gout  Will send steroid pack if she has flare up  No pmr signs

## 2024-11-27 NOTE — RESULT ENCOUNTER NOTE
Call patient her labs are partially back   Looks like she is at risk of gout  Will send steroid pack if she has flare up  No pmr signs

## 2024-11-28 LAB — BACTERIA UR CULT: NO GROWTH

## 2024-12-11 ENCOUNTER — SPECIALTY PHARMACY (OUTPATIENT)
Dept: PHARMACY | Facility: CLINIC | Age: 69
End: 2024-12-11

## 2024-12-11 PROCEDURE — RXMED WILLOW AMBULATORY MEDICATION CHARGE

## 2024-12-24 ENCOUNTER — PHARMACY VISIT (OUTPATIENT)
Dept: PHARMACY | Facility: CLINIC | Age: 69
End: 2024-12-24
Payer: COMMERCIAL

## 2025-01-17 ENCOUNTER — SPECIALTY PHARMACY (OUTPATIENT)
Dept: PHARMACY | Facility: CLINIC | Age: 70
End: 2025-01-17

## 2025-01-17 PROCEDURE — RXMED WILLOW AMBULATORY MEDICATION CHARGE

## 2025-01-30 ENCOUNTER — PHARMACY VISIT (OUTPATIENT)
Dept: PHARMACY | Facility: CLINIC | Age: 70
End: 2025-01-30
Payer: COMMERCIAL

## 2025-02-06 ENCOUNTER — OFFICE VISIT (OUTPATIENT)
Dept: PRIMARY CARE | Facility: CLINIC | Age: 70
End: 2025-02-06
Payer: MEDICARE

## 2025-02-06 VITALS
HEIGHT: 64 IN | OXYGEN SATURATION: 100 % | SYSTOLIC BLOOD PRESSURE: 136 MMHG | RESPIRATION RATE: 16 BRPM | WEIGHT: 178 LBS | HEART RATE: 80 BPM | TEMPERATURE: 98.5 F | BODY MASS INDEX: 30.39 KG/M2 | DIASTOLIC BLOOD PRESSURE: 72 MMHG

## 2025-02-06 DIAGNOSIS — N39.0 RECURRENT URINARY TRACT INFECTION: ICD-10-CM

## 2025-02-06 DIAGNOSIS — F33.1 MODERATE EPISODE OF RECURRENT MAJOR DEPRESSIVE DISORDER: ICD-10-CM

## 2025-02-06 DIAGNOSIS — N64.4 BREAST PAIN, RIGHT: Primary | ICD-10-CM

## 2025-02-06 DIAGNOSIS — N18.31 CHRONIC KIDNEY DISEASE, STAGE 3A (MULTI): ICD-10-CM

## 2025-02-06 DIAGNOSIS — R92.8 ABNORMAL MAMMOGRAM: ICD-10-CM

## 2025-02-06 PROCEDURE — 3078F DIAST BP <80 MM HG: CPT | Performed by: INTERNAL MEDICINE

## 2025-02-06 PROCEDURE — 99214 OFFICE O/P EST MOD 30 MIN: CPT | Performed by: INTERNAL MEDICINE

## 2025-02-06 PROCEDURE — 1159F MED LIST DOCD IN RCRD: CPT | Performed by: INTERNAL MEDICINE

## 2025-02-06 PROCEDURE — 3008F BODY MASS INDEX DOCD: CPT | Performed by: INTERNAL MEDICINE

## 2025-02-06 PROCEDURE — 1160F RVW MEDS BY RX/DR IN RCRD: CPT | Performed by: INTERNAL MEDICINE

## 2025-02-06 PROCEDURE — 1124F ACP DISCUSS-NO DSCNMKR DOCD: CPT | Performed by: INTERNAL MEDICINE

## 2025-02-06 PROCEDURE — 3075F SYST BP GE 130 - 139MM HG: CPT | Performed by: INTERNAL MEDICINE

## 2025-02-06 RX ORDER — CEFDINIR 300 MG/1
300 CAPSULE ORAL 2 TIMES DAILY
Qty: 20 CAPSULE | Refills: 0 | Status: SHIPPED | OUTPATIENT
Start: 2025-02-06

## 2025-02-06 NOTE — PROGRESS NOTES
"Subjective   Sherrie Chan is a 69 y.o. female who presents for Breast Pain.    HPI   Pt c/o chronic R breast pain.  Sharp pain to R axillary area.  Noted improvement w/ATB for UTI tx.  Previous biopsy, states clip placed.  Pain intermittent.  Worsening over past few months.  Last mammogram 6/21/2024    Took Cefdinir last week for UTI sx's.  Fatigue, BP elevating, Depression, forgetfulness.  States sx's improved.  Reports longer period of time inbetween UTI sx's. Approx 6 weeks.    Review of Systems   Constitutional:  Negative for fatigue and fever.   Respiratory:  Negative for cough and shortness of breath.    Cardiovascular:  Negative for chest pain and leg swelling.   All other systems reviewed and are negative.      Health Maintenance Due   Topic Date Due    CKD: Urine Protein Screening  Never done    Zoster Vaccines (1 of 2) Never done    RSV High Risk: (Elderly (60+) or Pregnant Population) (1 - Risk 60-74 years 1-dose series) Never done    DTaP/Tdap/Td Vaccines (2 - Td or Tdap) 01/27/2019    Pneumococcal Vaccine (2 of 2 - PPSV23) 07/11/2019    COVID-19 Vaccine (4 - 2024-25 season) 09/01/2024    Bone Density Scan  10/28/2024    Diabetes Screening  11/08/2024    Colorectal Cancer Screening  12/12/2024       Objective   /72   Pulse 80   Temp 36.9 °C (98.5 °F)   Resp 16   Ht 1.626 m (5' 4\")   Wt 80.7 kg (178 lb)   SpO2 100%   BMI 30.55 kg/m²     Physical Exam  Vitals and nursing note reviewed.   Constitutional:       Appearance: Normal appearance.   HENT:      Head: Normocephalic.   Eyes:      Conjunctiva/sclera: Conjunctivae normal.      Pupils: Pupils are equal, round, and reactive to light.   Cardiovascular:      Rate and Rhythm: Normal rate and regular rhythm.      Pulses: Normal pulses.      Heart sounds: Normal heart sounds.   Pulmonary:      Effort: Pulmonary effort is normal.      Breath sounds: Normal breath sounds.   Musculoskeletal:         General: No swelling.      Cervical back: Neck " supple.   Skin:     General: Skin is warm and dry.   Neurological:      General: No focal deficit present.      Mental Status: She is oriented to person, place, and time.         Assessment/Plan   Problem List Items Addressed This Visit       Chronic kidney disease, stage 3a (Multi)    Depression    Recurrent urinary tract infection    Relevant Medications    cefdinir (Omnicef) 300 mg capsule     Other Visit Diagnoses       Breast pain, right    -  Primary    Relevant Orders    Referral to Breast Surgery    Abnormal mammogram        Relevant Orders    BI mammo right diagnostic tomosynthesis    BI US breast complete right        Will refer to breast surgeon for follow up  Check dx mamm   Stable based on symptoms and exam.  Continue established treatment plan and follow up at least yearly.'

## 2025-02-07 ASSESSMENT — ENCOUNTER SYMPTOMS
FATIGUE: 0
FEVER: 0
COUGH: 0
SHORTNESS OF BREATH: 0

## 2025-02-24 ENCOUNTER — SPECIALTY PHARMACY (OUTPATIENT)
Dept: PHARMACY | Facility: CLINIC | Age: 70
End: 2025-02-24

## 2025-02-24 PROCEDURE — RXMED WILLOW AMBULATORY MEDICATION CHARGE

## 2025-02-26 ENCOUNTER — PHARMACY VISIT (OUTPATIENT)
Dept: PHARMACY | Facility: CLINIC | Age: 70
End: 2025-02-26
Payer: COMMERCIAL

## 2025-02-27 NOTE — PROGRESS NOTES
[facility] [place]  Sherrie Chan female   1955 69 y.o.   [id number]      Chief Complaint  New patient, right breast pain.    History Of Present Illness  Sherrie Chan is a pleasant 69 y.o.  female presenting to the breast center with right breast pain. She first noticed the pain 1 year ago but feels is has been worse recently. She describes the pain as on going and is intermittent. She denies trauma the breast. She denies nipple discharge, fever or chills. She drinks caffeine (1 small cup of coffee a day), denies fatty food and does not smoke. She denies breast surgery. She has a history of a benign right core biopsy. She has no family history of breast cancer.     BREAST IMAGIN2024 Bilateral diagnostic mammogram, indicates BI-RADS Category 1.     REPRODUCTIVE HISTORY: menarche age 11, , first birth age 26,  x 36 months, OCP's x 10 years, menopause age unknown s/p partial hysterectomy with intact ovaries, no HRT, scattered fibroglandular tissue    FAMILY CANCER HISTORY:   none     Review of Systems  Constitutional:  Negative for appetite change, fatigue, fever and unexpected weight change.   HENT:  Negative for ear pain, hearing loss, nosebleeds, sore throat and trouble swallowing.    Eyes:  Negative for discharge, itching and visual disturbance.   Breast: As stated in HPI.  Respiratory:  Negative for cough, chest tightness and shortness of breath.    Cardiovascular:  Negative for chest pain, palpitations and leg swelling.   Gastrointestinal:  Negative for abdominal pain, constipation, diarrhea and nausea.   Endocrine: Negative for cold intolerance and heat intolerance.   Genitourinary:  Negative for dysuria, frequency, hematuria, pelvic pain and vaginal bleeding.   Musculoskeletal:  Negative for arthralgias, back pain, gait problem, joint swelling and myalgias.   Skin:  Negative for color change and rash.   Allergic/Immunologic: Negative for environmental allergies and  food allergies.   Neurological:  Negative for dizziness, tremors, speech difficulty, weakness, numbness and headaches.   Hematological:  Does not bruise/bleed easily.   Psychiatric/Behavioral:  Negative for agitation, dysphoric mood and sleep disturbance. The patient is not nervous/anxious.       Past Medical History  She has a past medical history of Anxiety, Breast cyst (When I was in 20s), COVID-19 (2021), Headache, Heart murmur (Birth), Hypertension, Inflammatory polyarthropathy (Multi) (2021), Personal history of other diseases of the respiratory system, Personal history of other mental and behavioral disorders, and PONV (postoperative nausea and vomiting) ().    She has no past medical history of Personal history of irradiation.    Surgical History  She has a past surgical history that includes  section, classic (2017); Other surgical history (10/01/2019); Other surgical history (10/01/2019); MR angio head wo IV contrast (2023); MR angio neck wo IV contrast (2023); CT angio head w and wo IV contrast (2023); Breast biopsy; and Hysterectomy.    Family History  Cancer-related family history is not on file.     Social History  She reports that she has never smoked. She has never used smokeless tobacco. She reports that she does not currently use alcohol. She reports that she does not currently use drugs.    Allergies  Nitrofurantoin monohyd/m-cryst, Statins-hmg-coa reductase inhibitors, and Sulfamethoxazole    Medications  Current Outpatient Medications   Medication Instructions    amLODIPine (NORVASC) 10 mg, oral, Daily    aspirin 81 mg, Daily    cefdinir (OMNICEF) 300 mg, oral, 2 times daily    cholecalciferol, vitamin D3, 25 mcg (1,000 unit) tablet,chewable 1 tablet, Nightly    citalopram (CELEXA) 10 mg, oral, Daily    cod liver oiL oil 2 capsules, Daily with evening meal    evolocumab (Repatha SureClick) 140 mg/mL injection INJECT 140MG (1 PEN) UNDER THE SKIN  ONCE EVERY 2 WEEKS.    hydroCHLOROthiazide (HYDRODIURIL) 25 mg, oral, Daily    lactobacillus acidophilus (Florajen Acidophilus) capsule 1 capsule, Daily    lisinopril 10 mg tablet Take 0.5 tab by mouth QAM and 1 tab QPM.    multivitamin tablet 1 tablet, Daily with evening meal       Last Recorded Vitals  Vitals:    03/03/25 0907   BP: 141/71   Pulse: 86   Resp: 16        Physical Exam  Patient is alert and oriented x3 and in a relaxed and appropriate mood. Her gait is steady and hand grasps are equal. Sclera is clear. The breasts are nearly symmetrical. The tissue is soft without palpable abnormalities, discrete nodules or masses. The skin and nipples appear normal. There is no cervical, supraclavicular or axillary lymphadenopathy.       Relevant Results and Imaging  Study Result    Narrative & Impression   Interpreted By:  Sylvain Mancilla,   STUDY:  BI MAMMO BILATERAL DIAGNOSTIC TOMOSYNTHESIS;  6/21/2024 9:13 am      ACCESSION NUMBER(S):  [id number]      ORDERING CLINICIAN:  CHRISTEN SANCHEZ      INDICATION:  Signs/Symptoms:pain      COMPARISON:  09/08/2022      FINDINGS:  2D and tomosynthesis images were reviewed at 1 mm slice thickness.      Density:  There are areas of scattered fibroglandular tissue.      There are stable areas of asymmetry and nodularity bilaterally. No  new areas of architectural distortion. No new suspicious masses or  calcifications are identified.      IMPRESSION:  No mammographic evidence of malignancy.      BI-RADS CATEGORY:      BI-RADS Category:  1 Negative.  Recommendation:  Annual Screening.  Recommended Date:  1 Year.  Laterality:  Bilateral.      For any future breast imaging appointments, please call [phone]  ([phone]).          MACRO:  None      Signed by: Sylvain Mancilla 6/21/2024 3:01 PM  Dictation workstation:   [id number]     Visit Diagnosis  1. Breast pain        2. Breast pain, right  Referral to Breast Surgery          Assessment  Normal clinical exam and imaging, right breast  pain, hx benign right breast core biopsy, scattered fibroglandular tissue    Plan  Breast pain education given and encouraged to try home remedies. Return to PCP for annual mammograms and exams.    Patient Discussion/Summary  I am sorry you are experiencing breast pain. Breast pain is common in women of all ages. Be reassured most breast pain resolves without intervention and breast cancer usually does not cause breast pain. Your breast imaging and exam are normal.    Below is a list of interventions to help reduce or manage the painful symptoms:  Reduce or eliminate daily caffeine intake especially in the form of coffee, tea, chocolate, carbonated sodas and energy drinks  Reduce dietary fat intake to less than 15% of total calories or approximately 50g per day  Evening primrose oil as an over the counter supplement may be helpful. It is an antioxidant. Take 3g orally per day. It may take 3-4 months to notice a difference  If you are a smoker, stop smoking. You can call 4-417LumiataQUITLumiataNOW for the [place] tobacco quit line support. If you do not smoke, but are exposed to smokers, avoid secondhand smoke  Wear a proper fitting and supportive bra without wire  Compresses may be helpful. Apply warm compresses, ice packs or gentle massage  Medical Therapy: Over the counter Acetaminophen or a nonsteroidal anti-inflammatory drug such as Ibuprofen can be helpful as needed     You can see your health information, review clinical summaries from office visits & test results online when you follow your health with MY  Chart, a personal health record. To sign up go to [email or web address]. If you need assistance with signing up or trouble getting into your account call [facility] Patient Line 24/7 at [phone].    My office phone number is [phone] if you need to get in touch with me or have additional questions or concerns. Thank you for choosing [facility] and trusting me as your healthcare provider. I look  forward to seeing you again at your next office visit. I am honored to be a provider on your health care team and I remain dedicated to helping you achieve your health goals.    Ct Dickinson, APRN-CNP

## 2025-03-03 ENCOUNTER — OFFICE VISIT (OUTPATIENT)
Dept: SURGICAL ONCOLOGY | Facility: HOSPITAL | Age: 70
End: 2025-03-03
Payer: MEDICARE

## 2025-03-03 ENCOUNTER — HOSPITAL ENCOUNTER (OUTPATIENT)
Dept: RADIOLOGY | Facility: HOSPITAL | Age: 70
Discharge: HOME | End: 2025-03-03
Payer: MEDICARE

## 2025-03-03 ENCOUNTER — TELEPHONE (OUTPATIENT)
Dept: PRIMARY CARE | Facility: CLINIC | Age: 70
End: 2025-03-03

## 2025-03-03 VITALS
HEART RATE: 86 BPM | HEIGHT: 64 IN | DIASTOLIC BLOOD PRESSURE: 71 MMHG | RESPIRATION RATE: 16 BRPM | SYSTOLIC BLOOD PRESSURE: 141 MMHG | BODY MASS INDEX: 29.88 KG/M2 | WEIGHT: 175 LBS

## 2025-03-03 DIAGNOSIS — N64.4 BREAST PAIN, RIGHT: ICD-10-CM

## 2025-03-03 DIAGNOSIS — R92.8 ABNORMAL MAMMOGRAM: ICD-10-CM

## 2025-03-03 DIAGNOSIS — N64.4 BREAST PAIN: Primary | ICD-10-CM

## 2025-03-03 PROCEDURE — 3078F DIAST BP <80 MM HG: CPT | Performed by: NURSE PRACTITIONER

## 2025-03-03 PROCEDURE — 1159F MED LIST DOCD IN RCRD: CPT | Performed by: NURSE PRACTITIONER

## 2025-03-03 PROCEDURE — G0279 TOMOSYNTHESIS, MAMMO: HCPCS | Mod: RIGHT SIDE | Performed by: RADIOLOGY

## 2025-03-03 PROCEDURE — 1123F ACP DISCUSS/DSCN MKR DOCD: CPT | Performed by: NURSE PRACTITIONER

## 2025-03-03 PROCEDURE — 77065 DX MAMMO INCL CAD UNI: CPT | Mod: RIGHT SIDE | Performed by: RADIOLOGY

## 2025-03-03 PROCEDURE — 99203 OFFICE O/P NEW LOW 30 MIN: CPT | Performed by: NURSE PRACTITIONER

## 2025-03-03 PROCEDURE — 77061 BREAST TOMOSYNTHESIS UNI: CPT | Mod: RT

## 2025-03-03 PROCEDURE — 3077F SYST BP >= 140 MM HG: CPT | Performed by: NURSE PRACTITIONER

## 2025-03-03 PROCEDURE — 99213 OFFICE O/P EST LOW 20 MIN: CPT | Performed by: NURSE PRACTITIONER

## 2025-03-03 PROCEDURE — 3008F BODY MASS INDEX DOCD: CPT | Performed by: NURSE PRACTITIONER

## 2025-03-03 PROCEDURE — 1036F TOBACCO NON-USER: CPT | Performed by: NURSE PRACTITIONER

## 2025-03-03 NOTE — TELEPHONE ENCOUNTER
----- Message from Eufemia Ascencio sent at 3/3/2025  1:17 PM EST -----  Call patient mammogram is normal.

## 2025-03-12 ENCOUNTER — SPECIALTY PHARMACY (OUTPATIENT)
Dept: PHARMACY | Facility: CLINIC | Age: 70
End: 2025-03-12

## 2025-03-13 ENCOUNTER — APPOINTMENT (OUTPATIENT)
Dept: PRIMARY CARE | Facility: CLINIC | Age: 70
End: 2025-03-13
Payer: MEDICARE

## 2025-03-21 ENCOUNTER — SPECIALTY PHARMACY (OUTPATIENT)
Dept: PHARMACY | Facility: CLINIC | Age: 70
End: 2025-03-21

## 2025-03-27 PROCEDURE — RXMED WILLOW AMBULATORY MEDICATION CHARGE

## 2025-04-07 ENCOUNTER — SPECIALTY PHARMACY (OUTPATIENT)
Dept: PHARMACY | Facility: CLINIC | Age: 70
End: 2025-04-07

## 2025-04-10 ENCOUNTER — PHARMACY VISIT (OUTPATIENT)
Dept: PHARMACY | Facility: CLINIC | Age: 70
End: 2025-04-10
Payer: COMMERCIAL

## 2025-04-11 DIAGNOSIS — F32.A ANXIETY AND DEPRESSION: ICD-10-CM

## 2025-04-11 DIAGNOSIS — F41.9 ANXIETY AND DEPRESSION: ICD-10-CM

## 2025-04-11 DIAGNOSIS — I10 PRIMARY HYPERTENSION: ICD-10-CM

## 2025-04-11 RX ORDER — LISINOPRIL 10 MG/1
TABLET ORAL
Qty: 180 TABLET | Refills: 3 | Status: SHIPPED | OUTPATIENT
Start: 2025-04-11

## 2025-04-11 RX ORDER — CITALOPRAM 10 MG/1
10 TABLET ORAL DAILY
Qty: 90 TABLET | Refills: 3 | Status: SHIPPED | OUTPATIENT
Start: 2025-04-11 | End: 2026-04-11

## 2025-04-23 ENCOUNTER — APPOINTMENT (OUTPATIENT)
Dept: PRIMARY CARE | Facility: CLINIC | Age: 70
End: 2025-04-23
Payer: MEDICARE

## 2025-04-23 VITALS
TEMPERATURE: 98 F | BODY MASS INDEX: 31.07 KG/M2 | OXYGEN SATURATION: 100 % | WEIGHT: 182 LBS | HEIGHT: 64 IN | HEART RATE: 76 BPM | DIASTOLIC BLOOD PRESSURE: 86 MMHG | RESPIRATION RATE: 16 BRPM | SYSTOLIC BLOOD PRESSURE: 164 MMHG

## 2025-04-23 DIAGNOSIS — M35.3 PMR (POLYMYALGIA RHEUMATICA) (MULTI): Primary | ICD-10-CM

## 2025-04-23 DIAGNOSIS — N18.31 CHRONIC KIDNEY DISEASE, STAGE 3A (MULTI): ICD-10-CM

## 2025-04-23 DIAGNOSIS — N39.0 RECURRENT URINARY TRACT INFECTION: ICD-10-CM

## 2025-04-23 LAB
POC APPEARANCE, URINE: CLEAR
POC BILIRUBIN, URINE: NEGATIVE
POC BLOOD, URINE: ABNORMAL
POC COLOR, URINE: ABNORMAL
POC GLUCOSE, URINE: NEGATIVE MG/DL
POC KETONES, URINE: NEGATIVE MG/DL
POC LEUKOCYTES, URINE: NEGATIVE
POC NITRITE,URINE: NEGATIVE
POC PH, URINE: 5.5 PH
POC PROTEIN, URINE: NEGATIVE MG/DL
POC SPECIFIC GRAVITY, URINE: <=1.005
POC UROBILINOGEN, URINE: 0.2 EU/DL

## 2025-04-23 PROCEDURE — 1160F RVW MEDS BY RX/DR IN RCRD: CPT | Performed by: INTERNAL MEDICINE

## 2025-04-23 PROCEDURE — 1159F MED LIST DOCD IN RCRD: CPT | Performed by: INTERNAL MEDICINE

## 2025-04-23 PROCEDURE — 3077F SYST BP >= 140 MM HG: CPT | Performed by: INTERNAL MEDICINE

## 2025-04-23 PROCEDURE — 1036F TOBACCO NON-USER: CPT | Performed by: INTERNAL MEDICINE

## 2025-04-23 PROCEDURE — 3008F BODY MASS INDEX DOCD: CPT | Performed by: INTERNAL MEDICINE

## 2025-04-23 PROCEDURE — 3079F DIAST BP 80-89 MM HG: CPT | Performed by: INTERNAL MEDICINE

## 2025-04-23 PROCEDURE — G2211 COMPLEX E/M VISIT ADD ON: HCPCS | Performed by: INTERNAL MEDICINE

## 2025-04-23 PROCEDURE — 1124F ACP DISCUSS-NO DSCNMKR DOCD: CPT | Performed by: INTERNAL MEDICINE

## 2025-04-23 PROCEDURE — 99214 OFFICE O/P EST MOD 30 MIN: CPT | Performed by: INTERNAL MEDICINE

## 2025-04-23 PROCEDURE — 81002 URINALYSIS NONAUTO W/O SCOPE: CPT | Performed by: INTERNAL MEDICINE

## 2025-04-23 RX ORDER — AMOXICILLIN AND CLAVULANATE POTASSIUM 875; 125 MG/1; MG/1
875 TABLET, FILM COATED ORAL 2 TIMES DAILY
Qty: 14 TABLET | Refills: 1 | Status: SHIPPED | OUTPATIENT
Start: 2025-04-23 | End: 2025-05-07

## 2025-04-23 ASSESSMENT — ENCOUNTER SYMPTOMS
SHORTNESS OF BREATH: 0
FATIGUE: 0
FEVER: 0
COUGH: 0

## 2025-04-23 NOTE — PROGRESS NOTES
"Subjective   Sherrie Chan is a 70 y.o. female who presents for 6 month Follow-up.    HPI   Concern for UTI since beginning of month.  Took last of ATB supply.  RLQ pain, fatigue, nausea, lightheaded over past week.  Denies hematuria.  Afebrile.  Pt reports increase in BP since yesterday.   This /90.  Took extra half of Lisinopril this AM.    C/o se's w/Cefdinir.  Headaches, malaise w/use.    Review of Systems   Constitutional:  Negative for fatigue and fever.   Respiratory:  Negative for cough and shortness of breath.    Cardiovascular:  Negative for chest pain and leg swelling.   All other systems reviewed and are negative.      Health Maintenance Due   Topic Date Due    CKD: Urine Protein Screening  Never done    Zoster Vaccines (1 of 2) Never done    RSV High Risk: (Elderly (60+) or Pregnant Population) (1 - Risk 60-74 years 1-dose series) Never done    DTaP/Tdap/Td Vaccines (2 - Td or Tdap) 01/27/2019    Pneumococcal Vaccine (2 of 2 - PPSV23) 07/11/2019    COVID-19 Vaccine (4 - 2024-25 season) 09/01/2024    Bone Density Scan  10/28/2024    Diabetes Screening  11/08/2024    Colorectal Cancer Screening  12/12/2024       Objective   /86   Pulse 76   Temp 36.7 °C (98 °F)   Resp 16   Ht 1.626 m (5' 4\")   Wt 82.6 kg (182 lb)   SpO2 100%   BMI 31.24 kg/m²     Physical Exam  Vitals and nursing note reviewed.   Constitutional:       Appearance: Normal appearance.   HENT:      Head: Normocephalic.   Eyes:      Conjunctiva/sclera: Conjunctivae normal.      Pupils: Pupils are equal, round, and reactive to light.   Cardiovascular:      Rate and Rhythm: Normal rate and regular rhythm.      Pulses: Normal pulses.      Heart sounds: Normal heart sounds.   Pulmonary:      Effort: Pulmonary effort is normal.      Breath sounds: Normal breath sounds.   Musculoskeletal:         General: No swelling.      Cervical back: Neck supple.   Skin:     General: Skin is warm and dry.   Neurological:      General: No " focal deficit present.      Mental Status: She is oriented to person, place, and time.         Assessment/Plan   Problem List Items Addressed This Visit       Chronic kidney disease, stage 3a (Multi)    Relevant Orders    CBC    Comprehensive Metabolic Panel    PMR (polymyalgia rheumatica) (Multi) - Primary    Relevant Orders    Sedimentation Rate    Recurrent urinary tract infection    Relevant Medications    amoxicillin-clavulanate (Augmentin) 875-125 mg tablet    Other Relevant Orders    POCT UA (nonautomated) manually resulted (Completed)    Urinalysis with Reflex Microscopic    Urine Culture       Cont meds  Fu with urology  Check labs  Treat urine  Stable based on symptoms and exam.  Continue established treatment plan and follow up at least yearly.

## 2025-04-24 LAB
ALBUMIN SERPL-MCNC: 4.4 G/DL (ref 3.6–5.1)
ALP SERPL-CCNC: 49 U/L (ref 37–153)
ALT SERPL-CCNC: 12 U/L (ref 6–29)
ANION GAP SERPL CALCULATED.4IONS-SCNC: 9 MMOL/L (CALC) (ref 7–17)
AST SERPL-CCNC: 16 U/L (ref 10–35)
BILIRUB SERPL-MCNC: 0.4 MG/DL (ref 0.2–1.2)
BUN SERPL-MCNC: 20 MG/DL (ref 7–25)
CALCIUM SERPL-MCNC: 10 MG/DL (ref 8.6–10.4)
CHLORIDE SERPL-SCNC: 97 MMOL/L (ref 98–110)
CO2 SERPL-SCNC: 31 MMOL/L (ref 20–32)
CREAT SERPL-MCNC: 0.82 MG/DL (ref 0.6–1)
EGFRCR SERPLBLD CKD-EPI 2021: 76 ML/MIN/1.73M2
ERYTHROCYTE [DISTWIDTH] IN BLOOD BY AUTOMATED COUNT: 15.3 % (ref 11–15)
ERYTHROCYTE [SEDIMENTATION RATE] IN BLOOD BY WESTERGREN METHOD: 9 MM/H
GLUCOSE SERPL-MCNC: 118 MG/DL (ref 65–99)
HCT VFR BLD AUTO: 43.5 % (ref 35–45)
HGB BLD-MCNC: 13.8 G/DL (ref 11.7–15.5)
MCH RBC QN AUTO: 27.9 PG (ref 27–33)
MCHC RBC AUTO-ENTMCNC: 31.7 G/DL (ref 32–36)
MCV RBC AUTO: 88.1 FL (ref 80–100)
PLATELET # BLD AUTO: 252 THOUSAND/UL (ref 140–400)
PMV BLD REES-ECKER: 9.7 FL (ref 7.5–12.5)
POTASSIUM SERPL-SCNC: 4.4 MMOL/L (ref 3.5–5.3)
PROT SERPL-MCNC: 7.2 G/DL (ref 6.1–8.1)
RBC # BLD AUTO: 4.94 MILLION/UL (ref 3.8–5.1)
SODIUM SERPL-SCNC: 137 MMOL/L (ref 135–146)
WBC # BLD AUTO: 5.2 THOUSAND/UL (ref 3.8–10.8)

## 2025-04-25 ENCOUNTER — TELEPHONE (OUTPATIENT)
Dept: PRIMARY CARE | Facility: CLINIC | Age: 70
End: 2025-04-25
Payer: MEDICARE

## 2025-04-25 LAB
APPEARANCE UR: CLEAR
BACTERIA #/AREA URNS HPF: ABNORMAL /HPF
BACTERIA UR CULT: NORMAL
BILIRUB UR QL STRIP: NEGATIVE
COLOR UR: YELLOW
GLUCOSE UR QL STRIP: NEGATIVE
HGB UR QL STRIP: ABNORMAL
HYALINE CASTS #/AREA URNS LPF: ABNORMAL /LPF
KETONES UR QL STRIP: NEGATIVE
LEUKOCYTE ESTERASE UR QL STRIP: NEGATIVE
NITRITE UR QL STRIP: NEGATIVE
PH UR STRIP: 5.5 [PH] (ref 5–8)
PROT UR QL STRIP: NEGATIVE
RBC #/AREA URNS HPF: ABNORMAL /HPF
SERVICE CMNT-IMP: ABNORMAL
SP GR UR STRIP: 1 (ref 1–1.03)
SQUAMOUS #/AREA URNS HPF: ABNORMAL /HPF
WBC #/AREA URNS HPF: ABNORMAL /HPF

## 2025-04-25 NOTE — RESULT ENCOUNTER NOTE
Call patient her labs look good  Glucose borderline just want her to watch carbs and will recheck next visit

## 2025-04-25 NOTE — TELEPHONE ENCOUNTER
----- Message from Eufemia Ascencio sent at 4/25/2025  8:26 AM EDT -----  Call patient her labs look good  Glucose borderline just want her to watch carbs and will recheck next visit    ----- Message -----  From: Amisha Lin LPN  Sent: 4/23/2025  11:56 AM EDT  To: Eufemia Ascencio, DO

## 2025-04-29 ENCOUNTER — TELEPHONE (OUTPATIENT)
Dept: PRIMARY CARE | Facility: CLINIC | Age: 70
End: 2025-04-29
Payer: MEDICARE

## 2025-04-29 NOTE — TELEPHONE ENCOUNTER
Patient lm   Prescribed abx- advised to take abx for 10 days, script was for 7 days.   Still symptomatic. Slight improvement.   Foggy, back pain

## 2025-05-01 ENCOUNTER — SPECIALTY PHARMACY (OUTPATIENT)
Dept: PHARMACY | Facility: CLINIC | Age: 70
End: 2025-05-01

## 2025-05-01 PROCEDURE — RXMED WILLOW AMBULATORY MEDICATION CHARGE

## 2025-05-14 ENCOUNTER — PHARMACY VISIT (OUTPATIENT)
Dept: PHARMACY | Facility: CLINIC | Age: 70
End: 2025-05-14
Payer: COMMERCIAL

## 2025-05-14 DIAGNOSIS — N30.00 ACUTE CYSTITIS WITHOUT HEMATURIA: Primary | ICD-10-CM

## 2025-05-14 RX ORDER — DOXYCYCLINE HYCLATE 100 MG
100 TABLET ORAL 2 TIMES DAILY
Qty: 14 TABLET | Refills: 0 | Status: SHIPPED | OUTPATIENT
Start: 2025-05-14 | End: 2025-05-21

## 2025-05-27 ENCOUNTER — APPOINTMENT (OUTPATIENT)
Dept: PRIMARY CARE | Facility: CLINIC | Age: 70
End: 2025-05-27
Payer: MEDICARE

## 2025-05-27 VITALS
HEIGHT: 64 IN | SYSTOLIC BLOOD PRESSURE: 134 MMHG | WEIGHT: 178 LBS | HEART RATE: 72 BPM | BODY MASS INDEX: 30.39 KG/M2 | TEMPERATURE: 97.9 F | DIASTOLIC BLOOD PRESSURE: 72 MMHG | RESPIRATION RATE: 16 BRPM | OXYGEN SATURATION: 99 %

## 2025-05-27 DIAGNOSIS — M35.3 PMR (POLYMYALGIA RHEUMATICA) (MULTI): ICD-10-CM

## 2025-05-27 DIAGNOSIS — N39.0 RECURRENT URINARY TRACT INFECTION: Primary | ICD-10-CM

## 2025-05-27 DIAGNOSIS — E78.2 MIXED HYPERLIPIDEMIA: ICD-10-CM

## 2025-05-27 PROCEDURE — 99213 OFFICE O/P EST LOW 20 MIN: CPT | Performed by: INTERNAL MEDICINE

## 2025-05-27 PROCEDURE — 1160F RVW MEDS BY RX/DR IN RCRD: CPT | Performed by: INTERNAL MEDICINE

## 2025-05-27 PROCEDURE — 1159F MED LIST DOCD IN RCRD: CPT | Performed by: INTERNAL MEDICINE

## 2025-05-27 PROCEDURE — 1036F TOBACCO NON-USER: CPT | Performed by: INTERNAL MEDICINE

## 2025-05-27 PROCEDURE — 3008F BODY MASS INDEX DOCD: CPT | Performed by: INTERNAL MEDICINE

## 2025-05-27 PROCEDURE — G2211 COMPLEX E/M VISIT ADD ON: HCPCS | Performed by: INTERNAL MEDICINE

## 2025-05-27 PROCEDURE — 3075F SYST BP GE 130 - 139MM HG: CPT | Performed by: INTERNAL MEDICINE

## 2025-05-27 PROCEDURE — 3078F DIAST BP <80 MM HG: CPT | Performed by: INTERNAL MEDICINE

## 2025-05-27 ASSESSMENT — ENCOUNTER SYMPTOMS
FATIGUE: 0
SHORTNESS OF BREATH: 0
COUGH: 0
FEVER: 0

## 2025-05-27 NOTE — PROGRESS NOTES
"Subjective   Sherrie Chan is a 70 y.o. female who presents for 3 month Follow-up.    HPI   Recent UTI.  Rx Doxycycline, per Urologist.  BP elevated during infection.  Added 1/2 tab Lisinopril QAM.  Completed ATB.  BP WNL.  Denies adverse sx's r/t HTN.    Following up w/Urology 6/16/25.    Review of Systems   Constitutional:  Negative for fatigue and fever.   Respiratory:  Negative for cough and shortness of breath.    Cardiovascular:  Negative for chest pain and leg swelling.   All other systems reviewed and are negative.      Health Maintenance Due   Topic Date Due    CKD: Urine Protein Screening  Never done    Zoster Vaccines (1 of 2) Never done    RSV High Risk: (Elderly (60+) or Pregnant Population) (1 - Risk 60-74 years 1-dose series) Never done    DTaP/Tdap/Td Vaccines (2 - Td or Tdap) 01/27/2019    Pneumococcal Vaccine (2 of 2 - PPSV23) 07/11/2019    COVID-19 Vaccine (4 - 2024-25 season) 09/01/2024    Bone Density Scan  10/28/2024    Diabetes Screening  11/08/2024    Colorectal Cancer Screening  12/12/2024       Objective   /72   Pulse 72   Temp 36.6 °C (97.9 °F)   Resp 16   Ht 1.626 m (5' 4\")   Wt 80.7 kg (178 lb)   SpO2 99%   BMI 30.55 kg/m²     Physical Exam  Vitals and nursing note reviewed.   Constitutional:       Appearance: Normal appearance.   HENT:      Head: Normocephalic.   Eyes:      Conjunctiva/sclera: Conjunctivae normal.      Pupils: Pupils are equal, round, and reactive to light.   Cardiovascular:      Rate and Rhythm: Normal rate and regular rhythm.      Pulses: Normal pulses.      Heart sounds: Normal heart sounds.   Pulmonary:      Effort: Pulmonary effort is normal.      Breath sounds: Normal breath sounds.   Musculoskeletal:         General: No swelling.      Cervical back: Neck supple.   Skin:     General: Skin is warm and dry.   Neurological:      General: No focal deficit present.      Mental Status: She is oriented to person, place, and time.         Assessment/Plan "   Problem List Items Addressed This Visit       Hyperlipidemia    PMR (polymyalgia rheumatica) (Multi)    Recurrent urinary tract infection - Primary       Reviewed records  Doing better  Will contact uro for pcr recheck  Stable based on symptoms and exam.  Continue established treatment plan and follow up at least yearly.

## 2025-05-30 ENCOUNTER — SPECIALTY PHARMACY (OUTPATIENT)
Dept: PHARMACY | Facility: CLINIC | Age: 70
End: 2025-05-30

## 2025-06-04 DIAGNOSIS — B37.9 YEAST INFECTION: ICD-10-CM

## 2025-06-04 DIAGNOSIS — N30.00 ACUTE CYSTITIS WITHOUT HEMATURIA: Primary | ICD-10-CM

## 2025-06-04 RX ORDER — DOXYCYCLINE HYCLATE 100 MG
100 TABLET ORAL 2 TIMES DAILY
Qty: 14 TABLET | Refills: 0 | Status: SHIPPED | OUTPATIENT
Start: 2025-06-04 | End: 2025-06-11

## 2025-06-04 NOTE — PROGRESS NOTES
Urine DNA PCR testing, discussed with patient, doxycycline Rx sent in; has follow up appt. Planned.

## 2025-06-05 PROCEDURE — RXMED WILLOW AMBULATORY MEDICATION CHARGE

## 2025-06-12 ENCOUNTER — SPECIALTY PHARMACY (OUTPATIENT)
Dept: PHARMACY | Facility: CLINIC | Age: 70
End: 2025-06-12

## 2025-06-12 NOTE — PROGRESS NOTES
[facility] Clinical Note  Patient Reassessment     Introduction  Sherrie Chan is a 70 y.o. female who is on the specialty pharmacy service for management of: Hyperlipidemia Core.      [facility] supplied medication: Repatha Sureclick 140mg under the skin every 2 weeks    Duration of therapy: Maintenance    The most recent encounter visit with the referring prescriber La Lee MD on 7/9/24 was reviewed.  Pharmacy will continue to collaborate in the care of this patient with the referring prescriber.    Discussion  Sherrie was contacted on 6/12/2025 at 9:41 AM for a pharmacy visit with encounter number [id number] from:   [facility]  [address] [place] [id number]  Dept: [phone]  Dept Fax: [phone]  Sherrie consented to a/an Telephone visit, which was performed.    Efficacy  Patient has developed new symptoms of condition: No  Patient/caregiver feels medication is affecting the disease state: Unknown; due for repeat labs. Has been trending above goal. Could not afford Nexlizet (No copay assistance available) and patient ultimately wanted to work on lifestyle modifications to improve lipids in addition to Repatha. Plans to get repeat labs done prior to follow-up with Dr. eLe    Goals  Provided education on goals and possible outcomes of therapy:  Adherence with therapy  Timely completion of appropriate labs  Timely and appropriate follow up with provider  Identify and address medication interactions with presciption medications, OTC medications and supplements  Optimize or maintain quality of life  Hyperlipidemia: Reduce LDL by at least 50% from baseline  Reduce risk of future cardiovascular events   Patient status for goal(s): Off track    Tolerance  Patient has experienced side effects from this medication: No  Changes to current therapy regimen: No    The follow-up timeline was discussed. Every person responds to and reacts to  therapy differently. Patient should be assessed for efficacy and tolerability in approximately: 6 months    Adherence  Patient Information  Informant: Self (Patient)  Demonstrates Understanding of Importance of Adherence: Yes  Does the patient have any barriers to self-administration (including physical and mental?): No  Medication Information  Medication: evolocumab (Repatha SureClick)  Patient Reported Missed Doses in the Last 4 Weeks: 0  Estimated Medication Adherence Level: %  Adherence Estimation Source: Claims history  Barriers to Adherence: No Problems identified  What concerns do you have regarding your medications?: none   The importance of adherence was discussed and patient/caregiver was advised to take the medication as prescribed by their provider. Encouraged patient/caregiver to call physician's office or specialty pharmacy if they have a question regarding a missed dose.    General Assessment  Changes to home medications, OTCs or supplements: No  Current Medications[1]  Reported new allergies: No  Reported new medical conditions: No  Additional monitoring reviewed: Cardiology - Lipid panel:   Lab Results   Component Value Date    CHOL 276 (H) 07/16/2024    HDL 69.7 07/16/2024    CHHDL 4.0 07/16/2024    LDLCALC 151 (H) 07/16/2024    VLDL 55 (H) 07/16/2024    TRIG 276 (H) 07/16/2024    NHDL 206 (H) 07/16/2024     Is laboratory follow up needed? Yes - fasting lipid panel, as ordered, prior to follow-up visit with Dr. Lee    Advised to contact the pharmacy if there are any changes to the patient's medication list, including prescriptions, OTC medications, herbal products, or supplements.    Impression/Plan  This patient has been identified as high risk due to Co-morbidities.  The following action was taken: Patient/caregiver encouraged to participate in patient management program.    QOL/Patient Satisfaction  Rate your quality of life on scale of 1-10: 8  Rate your satisfaction with [facility] on scale of 1-10: 10 - Completely satisfied    Provided contact information ([phone]) for [facility] and reviewed dispensing process, refill timeline and patient management follow up. Confirmed understanding of education conducted during assessment. All questions and concerns were addressed and patient/caregiver was encouraged to reach out for additional questions or concerns.    Based on the patient's diagnosis, medication list, progress towards goals, adherence, tolerance, and medication list, medication remains appropriate: Therapy remains appropriate (I attest)    Marilu Stiles, PharmD        [1]   Current Outpatient Medications   Medication Sig Dispense Refill    amLODIPine (Norvasc) 10 mg tablet TAKE 1 TABLET BY MOUTH ONCE DAILY 90 tablet 3    aspirin 81 mg EC tablet Take 1 tablet (81 mg) by mouth once daily.      cholecalciferol, vitamin D3, 25 mcg (1,000 unit) tablet,chewable Chew 1 tablet (25 mcg) once daily at bedtime.      citalopram (CeleXA) 10 mg tablet Take 1 tablet (10 mg) by mouth once daily. 90 tablet 3    cod liver oiL oil Take 2 capsules by mouth once daily in the evening. Take with meals.      CRANBERRY EXTRACT-D-MANNOSE ORAL Take 1 serving po every day.      evolocumab (Repatha SureClick) 140 mg/mL injection INJECT 140MG (1 PEN) UNDER THE SKIN ONCE EVERY 2 WEEKS. 2 mL 11    hydroCHLOROthiazide (HYDRODiuril) 25 mg tablet TAKE 1 TABLET BY MOUTH ONCE DAILY 90 tablet 3    lactobacillus acidophilus (Florajen Acidophilus) capsule Take 1 capsule by mouth once daily. In the afternoon      lisinopril 10 mg tablet TAKE 1/2 (ONE-HALF) OF A TABLET BY MOUTH EVERY MORNING AND TAKE 1 TABLET BY MOUTH IN THE EVENING (Patient taking differently: Take 1 tablet (10 mg) by mouth once daily. TAKE 1/2 (ONE-HALF) OF A TABLET BY MOUTH EVERY MORNING AND TAKE 1 TABLET BY MOUTH IN THE EVENING) 180 tablet 3    multivitamin tablet Take 1 tablet by mouth once daily in the  evening. Take with meals.      vitamin E acetate (VITAMIN E ORAL) Take 1 cap by mouth daily.       No current facility-administered medications for this visit.

## 2025-06-13 ENCOUNTER — PHARMACY VISIT (OUTPATIENT)
Dept: PHARMACY | Facility: CLINIC | Age: 70
End: 2025-06-13
Payer: COMMERCIAL

## 2025-06-16 ENCOUNTER — APPOINTMENT (OUTPATIENT)
Dept: UROLOGY | Facility: CLINIC | Age: 70
End: 2025-06-16
Payer: MEDICARE

## 2025-06-16 ENCOUNTER — TELEPHONE (OUTPATIENT)
Dept: UROLOGY | Facility: CLINIC | Age: 70
End: 2025-06-16

## 2025-06-16 VITALS
BODY MASS INDEX: 29.88 KG/M2 | DIASTOLIC BLOOD PRESSURE: 71 MMHG | HEART RATE: 73 BPM | HEIGHT: 64 IN | WEIGHT: 175 LBS | TEMPERATURE: 98.1 F | SYSTOLIC BLOOD PRESSURE: 153 MMHG

## 2025-06-16 DIAGNOSIS — R39.89 OTHER SYMPTOMS AND SIGNS INVOLVING THE GENITOURINARY SYSTEM: ICD-10-CM

## 2025-06-16 DIAGNOSIS — N39.0 RECURRENT URINARY TRACT INFECTION: Primary | ICD-10-CM

## 2025-06-16 DIAGNOSIS — R39.9 UTI SYMPTOMS: ICD-10-CM

## 2025-06-16 LAB
POC APPEARANCE, URINE: CLEAR
POC BILIRUBIN, URINE: NEGATIVE
POC BLOOD, URINE: ABNORMAL
POC COLOR, URINE: YELLOW
POC GLUCOSE, URINE: NEGATIVE MG/DL
POC KETONES, URINE: NEGATIVE MG/DL
POC LEUKOCYTES, URINE: NEGATIVE
POC NITRITE,URINE: NEGATIVE
POC PH, URINE: 5.5 PH
POC PROTEIN, URINE: NEGATIVE MG/DL
POC SPECIFIC GRAVITY, URINE: <=1.005
POC UROBILINOGEN, URINE: 0.2 EU/DL

## 2025-06-16 PROCEDURE — 99213 OFFICE O/P EST LOW 20 MIN: CPT | Performed by: NURSE PRACTITIONER

## 2025-06-16 PROCEDURE — 1036F TOBACCO NON-USER: CPT | Performed by: NURSE PRACTITIONER

## 2025-06-16 PROCEDURE — 3078F DIAST BP <80 MM HG: CPT | Performed by: NURSE PRACTITIONER

## 2025-06-16 PROCEDURE — 81003 URINALYSIS AUTO W/O SCOPE: CPT | Performed by: NURSE PRACTITIONER

## 2025-06-16 PROCEDURE — 3077F SYST BP >= 140 MM HG: CPT | Performed by: NURSE PRACTITIONER

## 2025-06-16 PROCEDURE — G2211 COMPLEX E/M VISIT ADD ON: HCPCS | Performed by: NURSE PRACTITIONER

## 2025-06-16 PROCEDURE — 51798 US URINE CAPACITY MEASURE: CPT | Performed by: NURSE PRACTITIONER

## 2025-06-16 PROCEDURE — 1159F MED LIST DOCD IN RCRD: CPT | Performed by: NURSE PRACTITIONER

## 2025-06-16 PROCEDURE — 3008F BODY MASS INDEX DOCD: CPT | Performed by: NURSE PRACTITIONER

## 2025-06-16 RX ORDER — GRANULES FOR ORAL 3 G/1
POWDER ORAL
Qty: 9 G | Refills: 0 | Status: SHIPPED | OUTPATIENT
Start: 2025-06-16

## 2025-06-16 RX ORDER — GRANULES FOR ORAL 3 G/1
3 POWDER ORAL
Qty: 9 G | Refills: 0 | Status: SHIPPED | OUTPATIENT
Start: 2025-06-16 | End: 2025-06-16

## 2025-06-16 NOTE — TELEPHONE ENCOUNTER
PA for Fosfomycin approved!    Outcome  Approved today by [employer] 2017 NCP[employer]  Request Reference Number: [id number]. FOSFOMYCIN POW 3GM is approved through 12/31/2025. Your patient may now fill this prescription and it will be covered.  Effective Date: 6/16/2025  Authorization Expiration Date: 12/31/2025

## 2025-06-16 NOTE — PROGRESS NOTES
06/16/25   [id number]    Recurrent UTIs     Subjective      HPI Sherrie Chan is a 70 y.o. female who presents for recurrent UTIs; seen by me in the past, last 5/20/22; last saw Dr. Benitez on 8/31/23 for second opinion; feels UTIs never went away, worse in past few years; per patient 2023 saw Dr. Benitez for second opinion; ended up in ER during that time; Dr. Ascencio recommended she see urology again; feels the symptoms are bp starts to rise, exhausted, lower back pain, under her ribs; chronic back pain; when she has these symptoms burning in lower abdomen when this occurs;     No urgency, no frequency, no dysuria; no hematuria;     UA small heme, neg otherwise; PVR 23 ml; evaluation hematuria negative;     Treated on 5/14 and 6/4/25 for ecoli on DNA PCR testing Ecoli < 10k     History reviewed.  PMH: Anxiety, CKD 3A, depression, hypercalcemia, hyperlipidemia, hypertension, overactive bladder, polymyalgia rheumatica  Allx: Macrobid, sulfa       8/31/22 Dr. Benitez last notes:   Previously managed by my colleague, Camilla Bertrand, CNP. She reports 25y of recurrent UTIs. Relieved by antibiotic, but as soon as she stops she experiences recurrence. With UTIs, reports fatigue, somnolence, back pain followed by urgency and frequency w/ incontinence. She has also been seen by Dr. Beard.     UCx almost always negative  Urine PCR in 2022 positive for Ecoli and Enterococcus      Prior cystoscopies negative     Pt has tried D-mannose, cranberry, estrogen topical. With topical estrogen she believes she had hypertension.     CT urogram 05/2023 personally viewed and interpreted, unremarkable urinary system and bladder. There are several metal clips in the pelvis, however they are clearly distinct from the bladder and prior cystoscopies have confirmed this.     #Lower urinary tract symptoms, possible recurrent urinary tract infections  -She has had extensive treatment and work-up previously all of which has been negative  -She reports  that she feels better with antibiotics, however I am hesitant to prescribe suppressive antibiotics in the absence of positive urine cultures. Especially as she is developing sensitivities to multiple antibiotics. She understands this and agrees. If we were to use a suppressive antibiotic, I would use daily Keflex, but we will hold off for now.  -Standing order for UA/culture  -With her next concern for UTI would like her to get a urine PCR test  -Restart vaginal estrogen, check BP, discussed side effects  -I also recommended pelvic floor physical therapy for lower abdominal pain and urinary symptoms     5/20/22 last notes with me  Ms. Chan is a 67 year old female presents symptoms of UTI. Hx microscopic hematuria and recurrent UTIs. She endorses UTI symptoms on a monthly basis with general fatigue, dizziness, brain fog, bladder pressure which progresses to lower back pain, and nausea; Per patient last 5 days have been bad, had Augmentin left over from previous Rx from primary care, taking requesting few more days; Dropped off urine sample on Tuesday, Aneta, Nurse of Dr. Beard sent for DNA testing as per his last notes; Per patient, R side bladder pain, burning in that one spot after urination, then goes into overall bladder pain, back pain w fatigue, no urgency or frequency, no dysuria; no fever or hematuria;      PMH, PSH, SH, FH reviewed.     Imported from Dr. Beard last notes on 4/21/22  1. Hematuria    1a. negative cystoscopy with myself 10/24/19,   1b. Negative MECHE 12/2021   1c. 2 RBC/HPF on UA microscopy 1/31/22  2. Recurrent UTIs  3. Chronic renal insufficiency   4. Polymyalgia rheumatica      Last seen by Camilla Bertrand CNP, 1/31/22. 67 year old female presents for cystoscopy for microhematuria and recurrent UTIs. She endorses UTI symptoms on a monthly basis with general fatigue, dizziness, brain fog, bladder pressure which progresses to lower back pain, and nausea. Patient reports that she improves with  "antibiotic treatment but she has not had a positive culture within the last 4 years. She is allergic to statins and sulfa drugs. Currently denies hematuria, dysuria, nocturia, flank pain, and bothersome frequency or urgency. Patient is a nonsmoker.      Plan: Last seen by Camilla Bertrand CNP, 1/31/22. 67 year old female with recurrent UTIs. She endorses UTI symptoms on a monthly basis with general fatigue, dizziness, brain fog, bladder pressure which progresses to lower back pain, and nausea. Patient reports that she improves with antibiotic treatment but she has not had a positive culture within the last 4 years. She is allergic to statins and sulfa drugs.     DNA UTI testing with [facility] 4/13/20 demonstrated coagulase negative staph group > 100,000 cells/mL, viridans group strep > 100,000 cells/mL, corynebacterium riegelli 50,000-99,999 cells/mL, E. coli 50,00-99,999 cells/mL, actinotignum schaalii 10,000-49,999 cells/mL, and aerococcus urinae 10,000-49,999 cells/mL.     We did not proceed with cystoscopy as it is not indicated as per AUA guidelines and she had a negative cystoscopy in 2019. We discussed her UTI symptoms and negative cultures. I asked her to call Aneta Salazar RN, next time she has UTI symptoms as I would like her to complete polymicrobial UTI testing with [facility]. She will continue to follow up with Camilla Bertrand CNP, as scheduled. All questions and concerns were addressed. Patient verbalizes understanding and has no other questions at this time.     Objective     /71   Pulse 73   Temp 36.7 °C (98.1 °F)   Ht 1.626 m (5' 4\")   Wt 79.4 kg (175 lb)   BMI 30.04 kg/m²    Physical Exam  General: Appears comfortable and in no apparent distress, well nourished  Head: Normocephalic, atraumatic  Neck: trachea midline  Respiratory: respirations unlabored, no wheezes, and no use of accessory muscles  Cardiovascular: at rest no dyspnea, well perfused  Skin: no visible rashes or " lesions  Neurologic: grossly intact, oriented to person, place, and time  Psychiatric: mood and affect appropriate  Musculoskeletal: in chair for appt. no difficulty w upper body movement    Assessment/Plan   Problem List Items Addressed This Visit          Genitourinary and Reproductive    Recurrent urinary tract infection - Primary     Other Visit Diagnoses         UTI symptoms              No orders of the defined types were placed in this encounter.     Discussed maybe imaging of her back with Dr. Ascencio, perhaps there is neuromuscular component to this    Fosfomycin Rx sent, will try good Rx    Stopped estrogen cream as felt increased bp  PFPT didn't help either    Follow up 3 mos, discussed her seeing one the urologist further recommendations in collaboration with me.     Camilla Nurse line [phone]       Camilla Bertrand, APRN-CNP  Lab Results   Component Value Date    GLUCOSE 118 (H) 04/24/2025    CALCIUM 10.0 04/24/2025     04/24/2025    K 4.4 04/24/2025    CO2 31 04/24/2025    CL 97 (L) 04/24/2025    BUN 20 04/24/2025    CREATININE 0.82 04/24/2025

## 2025-06-16 NOTE — PATIENT INSTRUCTIONS
Discussed maybe imaging of her back with Dr. Ascencio, perhaps there is neuromuscular component to this    Fosfomycin Rx sent, will try good Rx    Stopped estrogen cream as felt increased bp  PFPT didn't help either    Follow up 3 mos  Camilla Nurse line [phone]

## 2025-06-27 ENCOUNTER — SPECIALTY PHARMACY (OUTPATIENT)
Dept: PHARMACY | Facility: CLINIC | Age: 70
End: 2025-06-27

## 2025-06-27 DIAGNOSIS — E78.2 MIXED HYPERLIPIDEMIA: ICD-10-CM

## 2025-06-27 RX ORDER — EVOLOCUMAB 140 MG/ML
140 INJECTION, SOLUTION SUBCUTANEOUS
Qty: 2 ML | Refills: 11 | Status: SHIPPED | OUTPATIENT
Start: 2025-06-27 | End: 2026-06-27

## 2025-07-02 LAB
CHOLEST SERPL-MCNC: 307 MG/DL
CHOLEST/HDLC SERPL: 4.1 (CALC)
HDLC SERPL-MCNC: 74 MG/DL
LDLC SERPL CALC-MCNC: 192 MG/DL (CALC)
NONHDLC SERPL-MCNC: 233 MG/DL (CALC)
TRIGL SERPL-MCNC: 217 MG/DL

## 2025-07-15 ENCOUNTER — PHARMACY VISIT (OUTPATIENT)
Dept: PHARMACY | Facility: CLINIC | Age: 70
End: 2025-07-15
Payer: COMMERCIAL

## 2025-07-15 ENCOUNTER — OFFICE VISIT (OUTPATIENT)
Dept: CARDIOLOGY | Facility: CLINIC | Age: 70
End: 2025-07-15
Payer: MEDICARE

## 2025-07-15 VITALS
WEIGHT: 175 LBS | OXYGEN SATURATION: 97 % | HEART RATE: 66 BPM | BODY MASS INDEX: 29.88 KG/M2 | DIASTOLIC BLOOD PRESSURE: 68 MMHG | SYSTOLIC BLOOD PRESSURE: 150 MMHG | HEIGHT: 64 IN

## 2025-07-15 DIAGNOSIS — I10 PRIMARY HYPERTENSION: ICD-10-CM

## 2025-07-15 DIAGNOSIS — I25.84 CORONARY ATHEROSCLEROSIS DUE TO CALCIFIED CORONARY LESION (CODE): ICD-10-CM

## 2025-07-15 DIAGNOSIS — I44.7 NEW ONSET LEFT BUNDLE BRANCH BLOCK (LBBB): ICD-10-CM

## 2025-07-15 DIAGNOSIS — G45.9 TIA (TRANSIENT ISCHEMIC ATTACK): ICD-10-CM

## 2025-07-15 DIAGNOSIS — E78.2 MIXED HYPERLIPIDEMIA: Primary | ICD-10-CM

## 2025-07-15 PROCEDURE — 99212 OFFICE O/P EST SF 10 MIN: CPT | Mod: 25

## 2025-07-15 PROCEDURE — 3077F SYST BP >= 140 MM HG: CPT | Performed by: INTERNAL MEDICINE

## 2025-07-15 PROCEDURE — 3008F BODY MASS INDEX DOCD: CPT | Performed by: INTERNAL MEDICINE

## 2025-07-15 PROCEDURE — 93005 ELECTROCARDIOGRAM TRACING: CPT | Performed by: INTERNAL MEDICINE

## 2025-07-15 PROCEDURE — 99214 OFFICE O/P EST MOD 30 MIN: CPT | Performed by: INTERNAL MEDICINE

## 2025-07-15 PROCEDURE — RXMED WILLOW AMBULATORY MEDICATION CHARGE

## 2025-07-15 PROCEDURE — 1159F MED LIST DOCD IN RCRD: CPT | Performed by: INTERNAL MEDICINE

## 2025-07-15 PROCEDURE — 1036F TOBACCO NON-USER: CPT | Performed by: INTERNAL MEDICINE

## 2025-07-15 PROCEDURE — 93010 ELECTROCARDIOGRAM REPORT: CPT | Performed by: INTERNAL MEDICINE

## 2025-07-15 PROCEDURE — 3078F DIAST BP <80 MM HG: CPT | Performed by: INTERNAL MEDICINE

## 2025-07-15 PROCEDURE — 1160F RVW MEDS BY RX/DR IN RCRD: CPT | Performed by: INTERNAL MEDICINE

## 2025-07-15 PROCEDURE — 1126F AMNT PAIN NOTED NONE PRSNT: CPT | Performed by: INTERNAL MEDICINE

## 2025-07-15 ASSESSMENT — ENCOUNTER SYMPTOMS
LOSS OF SENSATION IN FEET: 0
OCCASIONAL FEELINGS OF UNSTEADINESS: 0

## 2025-07-15 ASSESSMENT — PAIN SCALES - GENERAL: PAINLEVEL_OUTOF10: 0-NO PAIN

## 2025-07-15 NOTE — PROGRESS NOTES
Cardiology Office Note    REASON FOR VISIT:  Follow up  PCP (requesting provider): Eufemia Ascencio DO.    HPI:  Sherrie Chan is a 70 y.o. female with a past medical history of DLD (likely HeFH as highest ) with high TG and fatty liver disease, HTN, and PMR who comes to clinic for follow up.     She has been taking Repatha at full dose every 2 weeks for the last 6 months. She cut her hydrochlorothiazide in half for the last few weeks this summer because she felt it was dehydrating her too much.     Since she was seen last in this clinic, she has had a few UTIs which included one hospital admission. She has been feeling well in the last few months.     Her exercise decreased after her hospital admissions, but she has been increasing her exercise over the last few weeks. She walks 30 min outside 3-4x week. She feels good during exercise, denies chest pain and SOA with exercise.     She has been keeping a food journal. She eats mostly white meats and salads. She reports her diet was less controlled in the winter -- she was eating cheeses and fatty foods frequently.     After her last visit, she talked to  pharmacy regarding Nexlizet. They informed her the cost would be >$100 per month which is unaffordable for her.       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 She has never used smokeless tobacco. She reports that she does not currently use alcohol. She reports that she does not currently use drugs.    REVIEW OF SYSTEMS  Review of Systems  A 10+ point review of systems was otherwise negative except as noted and per  HPI.    ALLERGIES  Allergies[2]    MEDICATIONS  Current Outpatient Medications   Medication Instructions    amLODIPine (NORVASC) 10 mg, oral, Daily    aspirin 81 mg, Daily    cholecalciferol, vitamin D3, 25 mcg (1,000 unit) tablet,chewable 1 tablet, Nightly    citalopram (CELEXA) 10 mg, oral, Daily    cod liver oiL oil 2 capsules, Daily with evening meal    CRANBERRY EXTRACT-D-MANNOSE ORAL Take 1 serving po every day.    evolocumab (Repatha SureClick) 140 mg/mL injection INJECT 140MG (1 PEN) UNDER THE SKIN ONCE EVERY 2 WEEKS.    fosfomycin (Monurol) 3 gram packet One 3 gram packet every 3 days x 3 doses; No PA, will use coupon    hydroCHLOROthiazide (HYDRODIURIL) 25 mg, oral, Daily    lactobacillus acidophilus (Florajen Acidophilus) capsule 1 capsule, Daily    lisinopril 10 mg tablet TAKE 1/2 (ONE-HALF) OF A TABLET BY MOUTH EVERY MORNING AND TAKE 1 TABLET BY MOUTH IN THE EVENING    multivitamin tablet 1 tablet, Daily with evening meal    vitamin E acetate (VITAMIN E ORAL) Take 1 cap by mouth daily.       VITALS  Vitals:    07/15/25 0957   BP: 150/68   Pulse: 66   SpO2: 97%      Body mass index is 30.04 kg/m².    PHYSICAL EXAM  Constitutional: No acute distress, cooperative  HEENT: Normocephalic, atraumatic, EOMI  Cardiovascular: RRR, normal S1/S2, no murmurs noted  Pulmonary: Clear to auscultation b/l, no wheezes/crackles/rhonchi, no increased work of breathing, no supplemental oxygen  Lower extremities: Warm and well perfused, no lower extremity edema  Psych: Appropriate mood and affect     LABS  WHITE BLOOD CELL COUNT (Thousand/uL)   Date Value   04/24/2025 5.2     HEMOGLOBIN (g/dL)   Date Value   04/24/2025 13.8     PLATELET COUNT (Thousand/uL)   Date Value   04/24/2025 252     SODIUM (mmol/L)   Date Value   04/24/2025 137     POTASSIUM (mmol/L)   Date Value   04/24/2025 4.4     CHLORIDE (mmol/L)   Date Value   04/24/2025 97 (L)     CARBON DIOXIDE (mmol/L)   Date Value   04/24/2025 31     UREA NITROGEN (BUN)  (mg/dL)   Date Value   04/24/2025 20     CREATININE (mg/dL)   Date Value   04/24/2025 0.82     CALCIUM (mg/dL)   Date Value   04/24/2025 10.0     PROTEIN, TOTAL (g/dL)   Date Value   04/24/2025 7.2     BILIRUBIN, TOTAL (mg/dL)   Date Value   04/24/2025 0.4     ALKALINE PHOSPHATASE (U/L)   Date Value   04/24/2025 49     ALT (U/L)   Date Value   04/24/2025 12     AST (U/L)   Date Value   04/24/2025 16     GLUCOSE (mg/dL)   Date Value   04/24/2025 118 (H)     Cholesterol (mg/dL)   Date Value   07/16/2024 276 (H)   11/02/2023 285 (H)   03/31/2023 342 (H)   10/12/2022 277 (H)   07/21/2022 291 (H)     CHOLESTEROL, TOTAL (mg/dL)   Date Value   07/02/2025 307 (H)     LDL-CHOLESTEROL (mg/dL (calc))   Date Value   07/02/2025 192 (H)     LDL Calculated (mg/dL)   Date Value   07/16/2024 151 (H)   11/02/2023 161 (H)     HDL CHOLESTEROL (mg/dL)   Date Value   07/02/2025 74     HDL-Cholesterol (mg/dL)   Date Value   07/16/2024 69.7   11/02/2023 69.8     HDL (mg/dL)   Date Value   03/31/2023 74.5   10/12/2022 70.0   07/21/2022 68.0     TRIGLYCERIDES (mg/dL)   Date Value   07/02/2025 217 (H)     Triglycerides (mg/dL)   Date Value   07/16/2024 276 (H)   11/02/2023 269 (H)   03/31/2023 268 (H)   10/12/2022 306 (H)   07/21/2022 316 (H)     Hemoglobin A1C (%)   Date Value   11/08/2023 5.6   12/02/2021 5.9 (A)   10/12/2021 5.8 (A)   10/01/2019 6.2     Lab Results   Component Value Date    LDLF 214 (H) 03/31/2023         ASSESSMENT/PLAN  Sherrie Chan is a 70 y.o. female with a past medical history of DLD (likely HeFH as highest ) with high TG and fatty liver disease, HTN, and PMR who comes to clinic for follow up.      DLD  - Elevated TC and LDL-C while on taking Repatha   - Previously statin intolerant  - Nexlizet sent to  speciality pharmacy  - Patient is willing to consider low dose statin if Nexlizet is not affordable    New LBBB on EKG  - LBBB present on EKG today, not seen on previous EKGs  - Asx, feeling well in the  past year  - Ordered CT cor angio    HTN  - Elevated today in clinic, 150/68  - Reports well controlled at home, takes readings monthly  - Has been taking hydrochlorothiazide 12.5 for last few weeks   - Encouraged to switch back to HCTZ 25 mg from today    Follow-up in the office in 4 months    Signature: Brielle Tao, PGY-1    PATIENT SEEN, DISCUSSED, AND EXAMINED WITH RESIDENT AND I AGREE WITH ASSESSMENT/PLAN ABOVE. GIVEN NEW LBBB ON ECG, WILL GET CORONARY CTA (SHE HAS NO CHEST PAIN AND IS ACTIVE). LDL REMAINS 190S (THOUGH DOWN FROM 290S) ON REPATHA. SHE NEVER STARTED NEXLIZET LIKE I HAD RECOMMENDED LAST YEAR. RESENT NEXLIZET SCRIPT TO  SPECIALTY PHARMACY. IF COST PROHIBITIVE, SHE IS WILLING TO CONSIDER LOW INTERMITTENT STATIN DOSING. REPEAT FLP AND CMP IN 3 MONTHS. BP NOT CONTROLLED BUT SHE IS CUTTING hydrochlorothiazide IN HALF; RECOMMENDED TAKING FULL TABLET WHICH IS PRESCRIBED. RTC 4 MONTHS.           [1]   Family History  Problem Relation Name Age of Onset    Hypertension Mother Daphney Lopez     Anxiety disorder Mother Daphney Lopez     Depression Mother Daphney Lopez     Other (cardiac disorder) Father Brian Lopez     COPD Father Brian Lopez     Angina Father Brian Lopez     Heart attack Father Brian Lopez     Heart disease Father Brian Lopez     Coronary artery disease Sister      Migraines Neg Hx     [2]   Allergies  Allergen Reactions    Nitrofurantoin Monohyd/M-Cryst Diarrhea and Dizziness    Statins-Hmg-Coa Reductase Inhibitors Myalgia    Sulfamethoxazole Rash

## 2025-07-16 ENCOUNTER — PHARMACY VISIT (OUTPATIENT)
Dept: PHARMACY | Facility: CLINIC | Age: 70
End: 2025-07-16

## 2025-07-18 LAB
ATRIAL RATE: 67 BPM
P AXIS: 44 DEGREES
P OFFSET: 187 MS
P ONSET: 130 MS
PR INTERVAL: 172 MS
Q ONSET: 216 MS
QRS COUNT: 11 BEATS
QRS DURATION: 134 MS
QT INTERVAL: 432 MS
QTC CALCULATION(BAZETT): 456 MS
QTC FREDERICIA: 448 MS
R AXIS: -19 DEGREES
T AXIS: 93 DEGREES
T OFFSET: 432 MS
VENTRICULAR RATE: 67 BPM

## 2025-07-22 ENCOUNTER — TELEPHONE (OUTPATIENT)
Dept: CARDIOLOGY | Facility: CLINIC | Age: 70
End: 2025-07-22
Payer: MEDICARE

## 2025-07-22 DIAGNOSIS — I25.84 CORONARY ATHEROSCLEROSIS DUE TO CALCIFIED CORONARY LESION (CODE): ICD-10-CM

## 2025-07-22 LAB
ALBUMIN SERPL-MCNC: 4.5 G/DL (ref 3.6–5.1)
BUN SERPL-MCNC: 19 MG/DL (ref 7–25)
BUN/CREAT SERPL: ABNORMAL (CALC) (ref 6–22)
CALCIUM SERPL-MCNC: 10 MG/DL (ref 8.6–10.4)
CHLORIDE SERPL-SCNC: 96 MMOL/L (ref 98–110)
CO2 SERPL-SCNC: 29 MMOL/L (ref 20–32)
CREAT SERPL-MCNC: 0.94 MG/DL (ref 0.6–1)
EGFRCR SERPLBLD CKD-EPI 2021: 64 ML/MIN/1.73M2
GLUCOSE SERPL-MCNC: 116 MG/DL (ref 65–99)
PHOSPHATE SERPL-MCNC: 3 MG/DL (ref 2.1–4.3)
POTASSIUM SERPL-SCNC: 4.2 MMOL/L (ref 3.5–5.3)
SODIUM SERPL-SCNC: 135 MMOL/L (ref 135–146)

## 2025-07-22 RX ORDER — METOPROLOL TARTRATE 50 MG/1
50 TABLET ORAL
Qty: 1 TABLET | Refills: 0 | Status: SHIPPED | OUTPATIENT
Start: 2025-07-22 | End: 2025-07-23

## 2025-07-22 NOTE — TELEPHONE ENCOUNTER
Called and spoke to patient. Informed her that Dr. Lee is ordering metoprolol tartrate 50mg for her to take 1-2 hours prior to CT scan. Patient verbalized understanding.

## 2025-07-22 NOTE — TELEPHONE ENCOUNTER
----- Message from La Lee sent at 7/22/2025  8:54 AM EDT -----  Regarding: FW: CCTA beta blocker    ----- Message -----  From: Twyla Sam RN  Sent: 7/21/2025   8:45 AM EDT  To: La Lee MD  Subject: CCTA beta blocker                                Farhana Puga,   I am the RN responsible for the care of your patient during her upcoming CCTA.  I have moved her scan up to Friday to expedite care, but I notice that her HR is 66-71.  To optimize imaging with our older scanner, we need the HR less than 60.  If you would consider a pre arrival dose of beta blocker, that would get the patient in and out quite a bit faster.  Typically it is Lopressor 50mg or 100mg taken 1 to 2 hr prior to arrival.  If this is something that you think she would benefit from, I would appreciate it.  Feel free to reach out to me or Dr. SHORTY Padilla with any questions.  Thanks, Twyla

## 2025-07-24 ENCOUNTER — HOSPITAL ENCOUNTER (OUTPATIENT)
Dept: RADIOLOGY | Facility: CLINIC | Age: 70
Discharge: HOME | End: 2025-07-24
Payer: MEDICARE

## 2025-07-24 VITALS
RESPIRATION RATE: 22 BRPM | HEART RATE: 63 BPM | OXYGEN SATURATION: 100 % | DIASTOLIC BLOOD PRESSURE: 72 MMHG | SYSTOLIC BLOOD PRESSURE: 136 MMHG

## 2025-07-24 DIAGNOSIS — I25.84 CORONARY ATHEROSCLEROSIS DUE TO CALCIFIED CORONARY LESION (CODE): ICD-10-CM

## 2025-07-24 DIAGNOSIS — E78.2 MIXED HYPERLIPIDEMIA: ICD-10-CM

## 2025-07-24 DIAGNOSIS — I44.7 NEW ONSET LEFT BUNDLE BRANCH BLOCK (LBBB): ICD-10-CM

## 2025-07-24 DIAGNOSIS — I10 PRIMARY HYPERTENSION: ICD-10-CM

## 2025-07-24 PROCEDURE — 2500000001 HC RX 250 WO HCPCS SELF ADMINISTERED DRUGS (ALT 637 FOR MEDICARE OP): Performed by: INTERNAL MEDICINE

## 2025-07-24 PROCEDURE — 2550000001 HC RX 255 CONTRASTS: Performed by: INTERNAL MEDICINE

## 2025-07-24 PROCEDURE — 75574 CT ANGIO HRT W/3D IMAGE: CPT

## 2025-07-24 PROCEDURE — 75574 CT ANGIO HRT W/3D IMAGE: CPT | Performed by: INTERNAL MEDICINE

## 2025-07-24 RX ORDER — NITROGLYCERIN 400 UG/1
2 SPRAY ORAL ONCE
Status: COMPLETED | OUTPATIENT
Start: 2025-07-24 | End: 2025-07-24

## 2025-07-24 RX ADMIN — NITROGLYCERIN 2 SPRAY: 400 SPRAY ORAL at 08:27

## 2025-07-24 RX ADMIN — IOHEXOL 85 ML: 350 INJECTION, SOLUTION INTRAVENOUS at 08:40

## 2025-07-24 NOTE — NURSING NOTE
Post CCTA pt denies feeling dizzy or lightheaded, denies headache. Steady on feet, skin warm pink and dry. Pt verbalized understanding of increased water intake x24 hours, educated on side effects of medications. HL removed with tip intact, manual pressure held until hemostasis achieved, 2x2 and coban to site. Pt DC home to self care with family

## 2025-07-25 ENCOUNTER — HOSPITAL ENCOUNTER (OUTPATIENT)
Dept: RADIOLOGY | Facility: CLINIC | Age: 70
End: 2025-07-25
Payer: MEDICARE

## 2025-07-29 ENCOUNTER — HOSPITAL ENCOUNTER (OUTPATIENT)
Dept: RADIOLOGY | Facility: CLINIC | Age: 70
End: 2025-07-29
Payer: MEDICARE

## 2025-08-05 ENCOUNTER — SPECIALTY PHARMACY (OUTPATIENT)
Dept: PHARMACY | Facility: CLINIC | Age: 70
End: 2025-08-05

## 2025-08-05 PROCEDURE — RXMED WILLOW AMBULATORY MEDICATION CHARGE

## 2025-08-06 DIAGNOSIS — R39.9 UTI SYMPTOMS: ICD-10-CM

## 2025-08-11 ENCOUNTER — PHARMACY VISIT (OUTPATIENT)
Dept: PHARMACY | Facility: CLINIC | Age: 70
End: 2025-08-11
Payer: COMMERCIAL

## 2025-08-11 DIAGNOSIS — R30.0 DYSURIA: Primary | ICD-10-CM

## 2025-08-11 DIAGNOSIS — R39.9 UTI SYMPTOMS: ICD-10-CM

## 2025-08-11 RX ORDER — GRANULES FOR ORAL 3 G/1
POWDER ORAL
Refills: 0 | OUTPATIENT
Start: 2025-08-11

## 2025-08-11 RX ORDER — GRANULES FOR ORAL 3 G/1
POWDER ORAL
Qty: 9 G | Refills: 0 | Status: SHIPPED | OUTPATIENT
Start: 2025-08-11

## 2025-08-18 ENCOUNTER — HOSPITAL ENCOUNTER (OUTPATIENT)
Dept: RADIOLOGY | Facility: CLINIC | Age: 70
End: 2025-08-18
Payer: MEDICARE

## 2025-08-21 ENCOUNTER — APPOINTMENT (OUTPATIENT)
Dept: PRIMARY CARE | Facility: CLINIC | Age: 70
End: 2025-08-21
Payer: MEDICARE

## 2025-08-21 VITALS
DIASTOLIC BLOOD PRESSURE: 78 MMHG | HEART RATE: 76 BPM | SYSTOLIC BLOOD PRESSURE: 130 MMHG | TEMPERATURE: 97.8 F | OXYGEN SATURATION: 98 % | RESPIRATION RATE: 16 BRPM | BODY MASS INDEX: 29.71 KG/M2 | WEIGHT: 174 LBS | HEIGHT: 64 IN

## 2025-08-21 DIAGNOSIS — M35.3 PMR (POLYMYALGIA RHEUMATICA) (MULTI): ICD-10-CM

## 2025-08-21 DIAGNOSIS — I10 PRIMARY HYPERTENSION: Primary | ICD-10-CM

## 2025-08-21 DIAGNOSIS — N39.0 RECURRENT URINARY TRACT INFECTION: ICD-10-CM

## 2025-08-21 DIAGNOSIS — G89.29 CHRONIC BILATERAL LOW BACK PAIN WITHOUT SCIATICA: ICD-10-CM

## 2025-08-21 DIAGNOSIS — M54.50 CHRONIC BILATERAL LOW BACK PAIN WITHOUT SCIATICA: ICD-10-CM

## 2025-08-21 PROCEDURE — 1160F RVW MEDS BY RX/DR IN RCRD: CPT | Performed by: INTERNAL MEDICINE

## 2025-08-21 PROCEDURE — 1036F TOBACCO NON-USER: CPT | Performed by: INTERNAL MEDICINE

## 2025-08-21 PROCEDURE — 99214 OFFICE O/P EST MOD 30 MIN: CPT | Performed by: INTERNAL MEDICINE

## 2025-08-21 PROCEDURE — 1159F MED LIST DOCD IN RCRD: CPT | Performed by: INTERNAL MEDICINE

## 2025-08-21 PROCEDURE — 3075F SYST BP GE 130 - 139MM HG: CPT | Performed by: INTERNAL MEDICINE

## 2025-08-21 PROCEDURE — 3078F DIAST BP <80 MM HG: CPT | Performed by: INTERNAL MEDICINE

## 2025-08-21 PROCEDURE — 3008F BODY MASS INDEX DOCD: CPT | Performed by: INTERNAL MEDICINE

## 2025-08-21 PROCEDURE — G2211 COMPLEX E/M VISIT ADD ON: HCPCS | Performed by: INTERNAL MEDICINE

## 2025-08-21 ASSESSMENT — PATIENT HEALTH QUESTIONNAIRE - PHQ9
2. FEELING DOWN, DEPRESSED OR HOPELESS: NOT AT ALL
SUM OF ALL RESPONSES TO PHQ9 QUESTIONS 1 AND 2: 0
1. LITTLE INTEREST OR PLEASURE IN DOING THINGS: NOT AT ALL

## 2025-08-21 ASSESSMENT — ENCOUNTER SYMPTOMS
SHORTNESS OF BREATH: 0
FEVER: 0
COUGH: 0
FATIGUE: 0

## 2025-08-30 ENCOUNTER — HOSPITAL ENCOUNTER (OUTPATIENT)
Dept: RADIOLOGY | Facility: CLINIC | Age: 70
Discharge: HOME | End: 2025-08-30
Payer: MEDICARE

## 2025-09-03 ENCOUNTER — SPECIALTY PHARMACY (OUTPATIENT)
Dept: PHARMACY | Facility: CLINIC | Age: 70
End: 2025-09-03

## 2025-09-04 ENCOUNTER — RESULTS FOLLOW-UP (OUTPATIENT)
Dept: PRIMARY CARE | Facility: CLINIC | Age: 70
End: 2025-09-04
Payer: MEDICARE

## 2025-09-22 ENCOUNTER — APPOINTMENT (OUTPATIENT)
Dept: UROLOGY | Facility: CLINIC | Age: 70
End: 2025-09-22
Payer: MEDICARE

## 2025-09-24 ENCOUNTER — APPOINTMENT (OUTPATIENT)
Dept: PRIMARY CARE | Facility: CLINIC | Age: 70
End: 2025-09-24
Payer: MEDICARE